# Patient Record
Sex: FEMALE | Race: WHITE | Employment: FULL TIME | URBAN - NONMETROPOLITAN AREA
[De-identification: names, ages, dates, MRNs, and addresses within clinical notes are randomized per-mention and may not be internally consistent; named-entity substitution may affect disease eponyms.]

---
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
## 2019-01-03 ENCOUNTER — OFFICE VISIT (OUTPATIENT)
Dept: URGENT CARE | Age: 19
End: 2019-01-03
Payer: COMMERCIAL

## 2019-01-03 VITALS
OXYGEN SATURATION: 98 % | TEMPERATURE: 98 F | HEART RATE: 97 BPM | RESPIRATION RATE: 18 BRPM | SYSTOLIC BLOOD PRESSURE: 160 MMHG | WEIGHT: 260 LBS | BODY MASS INDEX: 37.22 KG/M2 | DIASTOLIC BLOOD PRESSURE: 96 MMHG | HEIGHT: 70 IN

## 2019-01-03 DIAGNOSIS — J02.9 SORE THROAT: ICD-10-CM

## 2019-01-03 DIAGNOSIS — R05.9 COUGH: ICD-10-CM

## 2019-01-03 DIAGNOSIS — H66.92 ACUTE LEFT OTITIS MEDIA: Primary | ICD-10-CM

## 2019-01-03 LAB — S PYO AG THROAT QL: NORMAL

## 2019-01-03 PROCEDURE — 87880 STREP A ASSAY W/OPTIC: CPT | Performed by: NURSE PRACTITIONER

## 2019-01-03 PROCEDURE — 96372 THER/PROPH/DIAG INJ SC/IM: CPT | Performed by: NURSE PRACTITIONER

## 2019-01-03 PROCEDURE — 99213 OFFICE O/P EST LOW 20 MIN: CPT | Performed by: NURSE PRACTITIONER

## 2019-01-03 RX ORDER — AMOXICILLIN 875 MG/1
875 TABLET, COATED ORAL 2 TIMES DAILY
Qty: 20 TABLET | Refills: 0 | Status: SHIPPED | OUTPATIENT
Start: 2019-01-03 | End: 2019-01-13

## 2019-01-03 RX ORDER — BENZONATATE 100 MG/1
100 CAPSULE ORAL 3 TIMES DAILY PRN
Qty: 21 CAPSULE | Refills: 0 | Status: SHIPPED | OUTPATIENT
Start: 2019-01-03 | End: 2019-01-10

## 2019-01-03 RX ORDER — DEXAMETHASONE SODIUM PHOSPHATE 10 MG/ML
10 INJECTION INTRAMUSCULAR; INTRAVENOUS ONCE
Status: COMPLETED | OUTPATIENT
Start: 2019-01-03 | End: 2019-01-03

## 2019-01-03 RX ADMIN — DEXAMETHASONE SODIUM PHOSPHATE 10 MG: 10 INJECTION INTRAMUSCULAR; INTRAVENOUS at 18:22

## 2019-01-03 ASSESSMENT — ENCOUNTER SYMPTOMS
NAUSEA: 0
SORE THROAT: 1
VOMITING: 0
COUGH: 1

## 2019-03-13 ENCOUNTER — OFFICE VISIT (OUTPATIENT)
Dept: URGENT CARE | Age: 19
End: 2019-03-13
Payer: COMMERCIAL

## 2019-03-13 VITALS
HEIGHT: 70 IN | WEIGHT: 259 LBS | HEART RATE: 112 BPM | SYSTOLIC BLOOD PRESSURE: 137 MMHG | OXYGEN SATURATION: 98 % | BODY MASS INDEX: 37.08 KG/M2 | TEMPERATURE: 98.2 F | DIASTOLIC BLOOD PRESSURE: 89 MMHG | RESPIRATION RATE: 18 BRPM

## 2019-03-13 DIAGNOSIS — J02.9 VIRAL PHARYNGITIS: Primary | ICD-10-CM

## 2019-03-13 DIAGNOSIS — J02.9 SORE THROAT: ICD-10-CM

## 2019-03-13 DIAGNOSIS — R05.9 COUGH: ICD-10-CM

## 2019-03-13 DIAGNOSIS — J30.2 SEASONAL ALLERGIES: ICD-10-CM

## 2019-03-13 LAB
INFLUENZA A ANTIBODY: NEGATIVE
INFLUENZA B ANTIBODY: NEGATIVE
S PYO AG THROAT QL: NORMAL

## 2019-03-13 PROCEDURE — 87880 STREP A ASSAY W/OPTIC: CPT | Performed by: SPECIALIST

## 2019-03-13 PROCEDURE — 99213 OFFICE O/P EST LOW 20 MIN: CPT | Performed by: SPECIALIST

## 2019-03-13 PROCEDURE — 87804 INFLUENZA ASSAY W/OPTIC: CPT | Performed by: SPECIALIST

## 2019-03-13 PROCEDURE — 96372 THER/PROPH/DIAG INJ SC/IM: CPT | Performed by: SPECIALIST

## 2019-03-13 RX ORDER — MONTELUKAST SODIUM 10 MG/1
10 TABLET ORAL DAILY
Qty: 30 TABLET | Refills: 3 | Status: SHIPPED | OUTPATIENT
Start: 2019-03-13 | End: 2021-10-12

## 2019-03-13 RX ORDER — METHYLPREDNISOLONE 4 MG/1
TABLET ORAL
Qty: 1 KIT | Refills: 0 | Status: SHIPPED | OUTPATIENT
Start: 2019-03-13 | End: 2019-11-07 | Stop reason: ALTCHOICE

## 2019-03-13 RX ORDER — DEXAMETHASONE SODIUM PHOSPHATE 10 MG/ML
5 INJECTION INTRAMUSCULAR; INTRAVENOUS ONCE
Status: COMPLETED | OUTPATIENT
Start: 2019-03-13 | End: 2019-03-13

## 2019-03-13 RX ADMIN — DEXAMETHASONE SODIUM PHOSPHATE 5 MG: 10 INJECTION INTRAMUSCULAR; INTRAVENOUS at 13:58

## 2019-03-13 ASSESSMENT — ENCOUNTER SYMPTOMS
SORE THROAT: 1
RHINORRHEA: 1
COUGH: 1

## 2019-03-26 ENCOUNTER — OFFICE VISIT (OUTPATIENT)
Dept: URGENT CARE | Age: 19
End: 2019-03-26
Payer: COMMERCIAL

## 2019-03-26 VITALS
WEIGHT: 261.4 LBS | HEART RATE: 93 BPM | SYSTOLIC BLOOD PRESSURE: 136 MMHG | DIASTOLIC BLOOD PRESSURE: 90 MMHG | TEMPERATURE: 98.5 F | OXYGEN SATURATION: 99 % | HEIGHT: 70 IN | BODY MASS INDEX: 37.42 KG/M2 | RESPIRATION RATE: 18 BRPM

## 2019-03-26 DIAGNOSIS — R11.0 NAUSEA: Primary | ICD-10-CM

## 2019-03-26 DIAGNOSIS — R10.9 FLANK PAIN: ICD-10-CM

## 2019-03-26 LAB
BILIRUBIN, POC: NEGATIVE
BLOOD URINE, POC: NEGATIVE
CLARITY, POC: CLEAR
COLOR, POC: YELLOW
CONTROL: PRESENT
GLUCOSE URINE, POC: NEGATIVE
KETONES, POC: NEGATIVE
LEUKOCYTE EST, POC: ABNORMAL
NITRITE, POC: NEGATIVE
PH, POC: 6
PREGNANCY TEST URINE, POC: NEGATIVE
PROTEIN, POC: NEGATIVE
SPECIFIC GRAVITY, POC: 1.02
UROBILINOGEN, POC: ABNORMAL

## 2019-03-26 PROCEDURE — 81002 URINALYSIS NONAUTO W/O SCOPE: CPT | Performed by: NURSE PRACTITIONER

## 2019-03-26 PROCEDURE — 81025 URINE PREGNANCY TEST: CPT | Performed by: NURSE PRACTITIONER

## 2019-03-26 PROCEDURE — 99213 OFFICE O/P EST LOW 20 MIN: CPT | Performed by: NURSE PRACTITIONER

## 2019-03-26 ASSESSMENT — ENCOUNTER SYMPTOMS
ABDOMINAL DISTENTION: 0
VOMITING: 0
NAUSEA: 1
EYE REDNESS: 0
SINUS PRESSURE: 0
WHEEZING: 0
SHORTNESS OF BREATH: 0
RESPIRATORY NEGATIVE: 1
DIARRHEA: 0
EYES NEGATIVE: 1
COUGH: 0
ALLERGIC/IMMUNOLOGIC NEGATIVE: 1
ABDOMINAL PAIN: 0
CONSTIPATION: 0
SORE THROAT: 0
TROUBLE SWALLOWING: 0

## 2019-03-27 ENCOUNTER — TRANSCRIBE ORDERS (OUTPATIENT)
Dept: ADMINISTRATIVE | Facility: HOSPITAL | Age: 19
End: 2019-03-27

## 2019-03-27 ENCOUNTER — HOSPITAL ENCOUNTER (OUTPATIENT)
Dept: GENERAL RADIOLOGY | Facility: HOSPITAL | Age: 19
Discharge: HOME | End: 2019-03-27
Admitting: NURSE PRACTITIONER

## 2019-03-27 DIAGNOSIS — R10.9 FLANK PAIN: ICD-10-CM

## 2019-03-27 DIAGNOSIS — R10.9 FLANK PAIN: Primary | ICD-10-CM

## 2019-03-27 PROCEDURE — 74018 RADEX ABDOMEN 1 VIEW: CPT

## 2019-03-28 LAB — URINE CULTURE, ROUTINE: NORMAL

## 2019-04-08 ENCOUNTER — TRANSCRIBE ORDERS (OUTPATIENT)
Dept: ADMINISTRATIVE | Facility: HOSPITAL | Age: 19
End: 2019-04-08

## 2019-04-08 ENCOUNTER — APPOINTMENT (OUTPATIENT)
Dept: LAB | Facility: HOSPITAL | Age: 19
End: 2019-04-08

## 2019-04-08 DIAGNOSIS — Z00.00 ROUTINE GENERAL MEDICAL EXAMINATION AT A HEALTH CARE FACILITY: Primary | ICD-10-CM

## 2019-04-08 DIAGNOSIS — I10 ESSENTIAL HYPERTENSION, MALIGNANT: ICD-10-CM

## 2019-04-08 DIAGNOSIS — E03.9 HYPOTHYROIDISM, ADULT: ICD-10-CM

## 2019-04-08 LAB
25(OH)D3 SERPL-MCNC: 23.4 NG/ML (ref 30–100)
ALBUMIN SERPL-MCNC: 3.8 G/DL (ref 3.5–5)
ALBUMIN/GLOB SERPL: 1.4 G/DL (ref 1.1–2.5)
ALP SERPL-CCNC: 56 U/L (ref 24–120)
ALT SERPL W P-5'-P-CCNC: 65 U/L (ref 0–54)
ANION GAP SERPL CALCULATED.3IONS-SCNC: 10 MMOL/L (ref 4–13)
ARTICHOKE IGE QN: 93 MG/DL (ref 0–99)
AST SERPL-CCNC: 38 U/L (ref 7–45)
BACTERIA UR QL AUTO: ABNORMAL /HPF
BASOPHILS # BLD AUTO: 0.05 10*3/MM3 (ref 0–0.2)
BASOPHILS NFR BLD AUTO: 0.9 % (ref 0–2)
BILIRUB SERPL-MCNC: 0.4 MG/DL (ref 0.1–1)
BILIRUB UR QL STRIP: NEGATIVE
BUN BLD-MCNC: 9 MG/DL (ref 5–21)
BUN/CREAT SERPL: 18 (ref 7–25)
CALCIUM SPEC-SCNC: 9 MG/DL (ref 8.4–10.4)
CHLORIDE SERPL-SCNC: 106 MMOL/L (ref 98–110)
CHOLEST SERPL-MCNC: 148 MG/DL (ref 130–200)
CLARITY UR: CLEAR
CO2 SERPL-SCNC: 23 MMOL/L (ref 24–31)
COLOR UR: YELLOW
CREAT BLD-MCNC: 0.5 MG/DL (ref 0.5–1.4)
DEPRECATED RDW RBC AUTO: 38.4 FL (ref 40–54)
EOSINOPHIL # BLD AUTO: 0.13 10*3/MM3 (ref 0–0.7)
EOSINOPHIL NFR BLD AUTO: 2.3 % (ref 0–4)
ERYTHROCYTE [DISTWIDTH] IN BLOOD BY AUTOMATED COUNT: 12 % (ref 12–15)
GFR SERPL CREATININE-BSD FRML MDRD: >150 ML/MIN/1.73
GLOBULIN UR ELPH-MCNC: 2.8 GM/DL
GLUCOSE BLD-MCNC: 86 MG/DL (ref 70–100)
GLUCOSE UR STRIP-MCNC: NEGATIVE MG/DL
HCT VFR BLD AUTO: 37.3 % (ref 37–47)
HDLC SERPL-MCNC: 41 MG/DL
HGB BLD-MCNC: 13.1 G/DL (ref 12–16)
HGB UR QL STRIP.AUTO: NEGATIVE
HYALINE CASTS UR QL AUTO: ABNORMAL /LPF
IMM GRANULOCYTES # BLD AUTO: 0.01 10*3/MM3 (ref 0–0.05)
IMM GRANULOCYTES NFR BLD AUTO: 0.2 % (ref 0–5)
KETONES UR QL STRIP: NEGATIVE
LDLC/HDLC SERPL: 2.14 {RATIO}
LEUKOCYTE ESTERASE UR QL STRIP.AUTO: ABNORMAL
LYMPHOCYTES # BLD AUTO: 2.04 10*3/MM3 (ref 0.72–4.86)
LYMPHOCYTES NFR BLD AUTO: 36.4 % (ref 15–45)
MCH RBC QN AUTO: 30.6 PG (ref 28–32)
MCHC RBC AUTO-ENTMCNC: 35.1 G/DL (ref 33–36)
MCV RBC AUTO: 87.1 FL (ref 82–98)
MONOCYTES # BLD AUTO: 0.57 10*3/MM3 (ref 0.19–1.3)
MONOCYTES NFR BLD AUTO: 10.2 % (ref 4–12)
NEUTROPHILS # BLD AUTO: 2.81 10*3/MM3 (ref 1.87–8.4)
NEUTROPHILS NFR BLD AUTO: 50 % (ref 39–78)
NITRITE UR QL STRIP: NEGATIVE
NRBC BLD AUTO-RTO: 0 /100 WBC (ref 0–0)
PH UR STRIP.AUTO: 7 [PH] (ref 5–8)
PLATELET # BLD AUTO: 190 10*3/MM3 (ref 130–400)
PMV BLD AUTO: 11.2 FL (ref 6–12)
POTASSIUM BLD-SCNC: 4 MMOL/L (ref 3.5–5.3)
PROT SERPL-MCNC: 6.6 G/DL (ref 6.3–8.7)
PROT UR QL STRIP: NEGATIVE
RBC # BLD AUTO: 4.28 10*6/MM3 (ref 4.2–5.4)
RBC # UR: ABNORMAL /HPF
REF LAB TEST METHOD: ABNORMAL
SODIUM BLD-SCNC: 139 MMOL/L (ref 135–145)
SP GR UR STRIP: 1.01 (ref 1–1.03)
SQUAMOUS #/AREA URNS HPF: ABNORMAL /HPF
TRIGL SERPL-MCNC: 96 MG/DL (ref 0–149)
TSH SERPL DL<=0.05 MIU/L-ACNC: 5.6 MIU/ML (ref 0.47–4.68)
UROBILINOGEN UR QL STRIP: ABNORMAL
WBC NRBC COR # BLD: 5.61 10*3/MM3 (ref 4.8–10.8)
WBC UR QL AUTO: ABNORMAL /HPF

## 2019-04-08 PROCEDURE — 80053 COMPREHEN METABOLIC PANEL: CPT | Performed by: PHYSICIAN ASSISTANT

## 2019-04-08 PROCEDURE — 84436 ASSAY OF TOTAL THYROXINE: CPT | Performed by: PHYSICIAN ASSISTANT

## 2019-04-08 PROCEDURE — 80061 LIPID PANEL: CPT | Performed by: PHYSICIAN ASSISTANT

## 2019-04-08 PROCEDURE — 81001 URINALYSIS AUTO W/SCOPE: CPT | Performed by: PHYSICIAN ASSISTANT

## 2019-04-08 PROCEDURE — 84443 ASSAY THYROID STIM HORMONE: CPT | Performed by: PHYSICIAN ASSISTANT

## 2019-04-08 PROCEDURE — 82306 VITAMIN D 25 HYDROXY: CPT | Performed by: PHYSICIAN ASSISTANT

## 2019-04-08 PROCEDURE — 85025 COMPLETE CBC W/AUTO DIFF WBC: CPT | Performed by: PHYSICIAN ASSISTANT

## 2019-04-08 PROCEDURE — 36415 COLL VENOUS BLD VENIPUNCTURE: CPT | Performed by: PHYSICIAN ASSISTANT

## 2019-04-09 LAB — T4 SERPL-MCNC: 6.6 UG/DL (ref 4.5–12)

## 2019-04-10 ENCOUNTER — TRANSCRIBE ORDERS (OUTPATIENT)
Dept: ADMINISTRATIVE | Facility: HOSPITAL | Age: 19
End: 2019-04-10

## 2019-04-10 DIAGNOSIS — R79.89 ELEVATED LFTS: Primary | ICD-10-CM

## 2019-04-15 ENCOUNTER — HOSPITAL ENCOUNTER (OUTPATIENT)
Dept: ULTRASOUND IMAGING | Facility: HOSPITAL | Age: 19
Discharge: HOME | End: 2019-04-15
Admitting: PHYSICIAN ASSISTANT

## 2019-04-15 ENCOUNTER — APPOINTMENT (OUTPATIENT)
Dept: ULTRASOUND IMAGING | Facility: HOSPITAL | Age: 19
End: 2019-04-15

## 2019-04-15 PROCEDURE — 76705 ECHO EXAM OF ABDOMEN: CPT

## 2019-04-17 ENCOUNTER — APPOINTMENT (OUTPATIENT)
Dept: ULTRASOUND IMAGING | Facility: HOSPITAL | Age: 19
End: 2019-04-17

## 2019-06-06 ENCOUNTER — NURSE TRIAGE (OUTPATIENT)
Dept: CALL CENTER | Facility: HOSPITAL | Age: 19
End: 2019-06-06

## 2019-06-07 NOTE — TELEPHONE ENCOUNTER
Reviewed Guideline with caller, advises home care. Advised caller she may want to contact the on call doctor for her OB/GYN and get their opinion. Caller agrees to this plan.     Reason for Disposition  • Back pain    Additional Information  • Negative: Passed out (i.e., lost consciousness, collapsed and was not responding)  • Negative: Shock suspected (e.g., cold/pale/clammy skin, too weak to stand, low BP, rapid pulse)  • Negative: Sounds like a life-threatening emergency to the triager  • Negative: Major injury to the back (e.g., MVA, fall > 10 feet or 3 meters, penetrating injury, etc.)  • Negative: Followed a tailbone injury  • Negative: [1] Pain in the upper back over the ribs (rib cage) AND [2] radiates (travels, goes) into chest  • Negative: [1] Pain in the upper back over the ribs (rib cage) AND [2] worsened by coughing (or clearly increases with breathing)  • Negative: Back pain during pregnancy  • Negative: Pain mainly in flank (i.e., in the side, over the lower ribs or just below the ribs)  • Negative: [1] SEVERE back pain (e.g., excruciating) AND [2] sudden onset AND [3] age > 60  • Negative: [1] Unable to urinate (or only a few drops) > 4 hours AND     [2] bladder feels very full (e.g., palpable bladder or strong urge to urinate)  • Negative: [1] Urinary or bowel incontinence (i.e., loss of bladder or bowel control) AND [2] new onset  • Negative: Numbness in groin or rectal area (i.e., loss of sensation)  • Negative: [1] SEVERE abdominal pain AND [2] present > 1 hour  • Negative: [1] Abdominal pain AND [2] age > 60  • Negative: Weakness of a leg or foot (e.g., unable to bear weight, dragging foot)  • Negative: Unable to walk  • Negative: Patient sounds very sick or weak to the triager  • Negative: [1] SEVERE back pain (e.g., excruciating, unable to do any normal activities) AND [2] not improved 2 hours after pain medicine  • Negative: [1] Pain radiates into the thigh or further down the leg AND [2]  "both legs  • Negative: [1] Fever > 100.0 F (37.8 C) AND [2] flank pain (i.e., in side, below ribs and above hip)  • Negative: [1] Pain or burning with urination AND [2] flank pain (i.e., in side, below ribs and above hip)  • Negative: Numbness in a leg or foot (i.e., loss of sensation)  • Negative: High-risk adult (e.g., history of cancer, HIV, or IV drug abuse)  • Negative: [1] Fever AND [2] no symptoms of UTI  (Exception: has generalized muscle pains, not localized back pain)  • Negative: Rash in same area as pain (may be described as \"small blisters\")  • Negative: Blood in urine (red, pink, or tea-colored)  • Negative: [1] MODERATE back pain (e.g., interferes with normal activities) AND [2] present > 3 days  • Negative: [1] Pain radiates into the thigh or further down the leg AND [2] one leg  • Negative: [1] Age > 50 AND [2] no history of prior similar back pain  • Negative: Back pain present > 2 weeks  • Negative: Back pain is a chronic symptom (recurrent or ongoing AND present > 4 weeks)  • Negative: Caused by a twisting, bending, or lifting injury  • Negative: Caused by overuse from recent vigorous activity (e.g., exercise, gardening, lifting and carrying, sports)    Answer Assessment - Initial Assessment Questions  1. ONSET: \"When did the pain begin?\"       This morning  2. LOCATION: \"Where does it hurt?\" (upper, mid or lower back)     Lower Back and right side  3. SEVERITY: \"How bad is the pain?\"  (e.g., Scale 1-10; mild, moderate, or severe)    - MILD (1-3): doesn't interfere with normal activities     - MODERATE (4-7): interferes with normal activities or awakens from sleep     - SEVERE (8-10): excruciating pain, unable to do any normal activities       mild  4. PATTERN: \"Is the pain constant?\" (e.g., yes, no; constant, intermittent)       constant  5. RADIATION: \"Does the pain shoot into your legs or elsewhere?\"      A little but not all the time  6. CAUSE:  \"What do you think is causing the back pain?\" " "      Ovarian cyst  7. BACK OVERUSE:  \"Any recent lifting of heavy objects, strenuous work or exercise?\"      no  8. MEDICATIONS: \"What have you taken so far for the pain?\" (e.g., nothing, acetaminophen, NSAIDS)      no  9. NEUROLOGIC SYMPTOMS: \"Do you have any weakness, numbness, or problems with bowel/bladder control?\"      no  10. OTHER SYMPTOMS: \"Do you have any other symptoms?\" (e.g., fever, abdominal pain, burning with urination, blood in urine)        no  11. PREGNANCY: \"Is there any chance you are pregnant?\" (e.g., yes, no; LMP)        No, LMP May 20, 2019    Protocols used: BACK PAIN-ADULT-AH      "

## 2019-07-05 ENCOUNTER — TRANSCRIBE ORDERS (OUTPATIENT)
Dept: ADMINISTRATIVE | Facility: HOSPITAL | Age: 19
End: 2019-07-05

## 2019-07-05 ENCOUNTER — HOSPITAL ENCOUNTER (OUTPATIENT)
Dept: GENERAL RADIOLOGY | Facility: HOSPITAL | Age: 19
Discharge: HOME | End: 2019-07-05
Admitting: INTERNAL MEDICINE

## 2019-07-05 DIAGNOSIS — R76.11 NONSPECIFIC REACTION TO TUBERCULIN SKIN TEST WITHOUT ACTIVE TUBERCULOSIS: ICD-10-CM

## 2019-07-05 DIAGNOSIS — R76.11 NONSPECIFIC REACTION TO TUBERCULIN SKIN TEST WITHOUT ACTIVE TUBERCULOSIS: Primary | ICD-10-CM

## 2019-07-05 PROCEDURE — 71046 X-RAY EXAM CHEST 2 VIEWS: CPT

## 2019-07-09 ENCOUNTER — TRANSCRIBE ORDERS (OUTPATIENT)
Dept: ADMINISTRATIVE | Facility: HOSPITAL | Age: 19
End: 2019-07-09

## 2019-07-09 ENCOUNTER — APPOINTMENT (OUTPATIENT)
Dept: LAB | Facility: HOSPITAL | Age: 19
End: 2019-07-09

## 2019-07-09 DIAGNOSIS — E03.9 HYPOTHYROIDISM, UNSPECIFIED TYPE: Primary | ICD-10-CM

## 2019-07-09 LAB
T4 FREE SERPL-MCNC: 0.99 NG/DL (ref 0.78–2.19)
TSH SERPL DL<=0.05 MIU/L-ACNC: 5.83 MIU/ML (ref 0.47–4.68)

## 2019-07-09 PROCEDURE — 84439 ASSAY OF FREE THYROXINE: CPT | Performed by: PHYSICIAN ASSISTANT

## 2019-07-09 PROCEDURE — 36415 COLL VENOUS BLD VENIPUNCTURE: CPT | Performed by: PHYSICIAN ASSISTANT

## 2019-07-09 PROCEDURE — 84443 ASSAY THYROID STIM HORMONE: CPT | Performed by: PHYSICIAN ASSISTANT

## 2019-08-13 ENCOUNTER — OFFICE VISIT (OUTPATIENT)
Dept: OBGYN | Age: 19
End: 2019-08-13
Payer: COMMERCIAL

## 2019-08-13 ENCOUNTER — TRANSCRIBE ORDERS (OUTPATIENT)
Dept: ADMINISTRATIVE | Facility: HOSPITAL | Age: 19
End: 2019-08-13

## 2019-08-13 VITALS
SYSTOLIC BLOOD PRESSURE: 136 MMHG | WEIGHT: 280 LBS | BODY MASS INDEX: 40.09 KG/M2 | HEIGHT: 70 IN | TEMPERATURE: 98 F | HEART RATE: 85 BPM | DIASTOLIC BLOOD PRESSURE: 86 MMHG

## 2019-08-13 DIAGNOSIS — N83.209 CYST OF OVARY, UNSPECIFIED LATERALITY: Primary | ICD-10-CM

## 2019-08-13 DIAGNOSIS — R10.11 ABDOMINAL PAIN, RIGHT UPPER QUADRANT: Primary | ICD-10-CM

## 2019-08-13 PROCEDURE — 99203 OFFICE O/P NEW LOW 30 MIN: CPT | Performed by: NURSE PRACTITIONER

## 2019-08-13 RX ORDER — LEVOTHYROXINE SODIUM 0.07 MG/1
75 TABLET ORAL DAILY
COMMUNITY
Start: 2019-07-10

## 2019-08-13 ASSESSMENT — ENCOUNTER SYMPTOMS
GASTROINTESTINAL NEGATIVE: 1
EYES NEGATIVE: 1
RESPIRATORY NEGATIVE: 1

## 2019-08-13 NOTE — PROGRESS NOTES
Ela Whatley is a 20 y.o. female who presents today for her medical conditions/ complaints as noted below. Ela Whatley is c/o of Other (Ovarian cyst found on CT on right ovary)        HPI  Ovarian cyst found on CT scan, accidentally, when looking for kidney stone. Patient has no pain with it but wanted a second opinion. I have no record of pt having CT scan. She has been seen most recently at Dr [facility] and had US and states they were following an ovarian cyst.   States periods normal and regular  On OCP and doesn't miss any doses. NO hx of PCOS. Patient's last menstrual period was 08/05/2019. No obstetric history on file. Past Medical History:   Diagnosis Date    Anxiety      Past Surgical History:   Procedure Laterality Date    WISDOM TOOTH EXTRACTION       Family History   Problem Relation Age of Onset    Other Mother         kidney stones    Thyroid Disease Mother     Cancer Other         unknown, great maternal grandmother     Social History     Tobacco Use    Smoking status: Never Smoker    Smokeless tobacco: Never Used   Substance Use Topics    Alcohol use: No       Current Outpatient Medications   Medication Sig Dispense Refill    levothyroxine (SYNTHROID) 50 MCG tablet       fluticasone (FLONASE) 50 MCG/ACT nasal spray 1 spray by Nasal route daily 1 Bottle 1    norgestimate-ethinyl estradiol (SPRINTEC 28) 0.25-35 MG-MCG per tablet Take 1 tablet by mouth daily 1 packet 0    montelukast (SINGULAIR) 10 MG tablet Take 1 tablet by mouth daily (Patient not taking: Reported on 8/13/2019) 30 tablet 3    methylPREDNISolone (MEDROL DOSEPACK) 4 MG tablet Take by mouth. (Patient not taking: Reported on 8/13/2019) 1 kit 0     No current facility-administered medications for this visit. No Known Allergies  Vitals:    08/13/19 1146   BP: 136/86   Pulse: 85   Temp: 98 °F (36.7 °C)     Body mass index is 40.18 kg/m². Review of Systems   Constitutional: Negative. HENT: Negative.     Eyes: Negative. Respiratory: Negative. Cardiovascular: Negative. Gastrointestinal: Negative. Genitourinary: Negative for difficulty urinating, dyspareunia, dysuria, enuresis, frequency, hematuria, menstrual problem, pelvic pain, urgency and vaginal discharge. Musculoskeletal: Negative. Skin: Negative. Neurological: Negative. Psychiatric/Behavioral: Negative. Physical Exam   Constitutional: She is oriented to person, place, and time. She appears well-developed and well-nourished. No distress. HENT:   Head: Normocephalic and atraumatic. Eyes: Pupils are equal, round, and reactive to light. Conjunctivae are normal.   Neck: Normal range of motion. Pulmonary/Chest: Effort normal.   Abdominal: There is no guarding. Musculoskeletal: Normal range of motion. She exhibits no edema. Normal ROM in all 4 extremities; normal gait   Neurological: She is alert and oriented to person, place, and time. She exhibits normal muscle tone. Coordination normal.   Skin: Skin is warm and dry. She is not diaphoretic. Psychiatric: She has a normal mood and affect. Her behavior is normal.   Nursing note and vitals reviewed. Diagnosis Orders   1. Cyst of ovary, unspecified laterality         MEDICATIONS:  No orders of the defined types were placed in this encounter. ORDERS:  No orders of the defined types were placed in this encounter. PLAN:  Filled out med record release. Will continue OCP. Consider repeat US to characterize cyst pending records. There are no Patient Instructions on file for this visit.

## 2019-08-16 ENCOUNTER — HOSPITAL ENCOUNTER (OUTPATIENT)
Dept: NUCLEAR MEDICINE | Facility: HOSPITAL | Age: 19
End: 2019-08-16

## 2019-08-16 ENCOUNTER — APPOINTMENT (OUTPATIENT)
Dept: ULTRASOUND IMAGING | Facility: HOSPITAL | Age: 19
End: 2019-08-16

## 2019-08-19 ENCOUNTER — TRANSCRIBE ORDERS (OUTPATIENT)
Dept: ADMINISTRATIVE | Facility: HOSPITAL | Age: 19
End: 2019-08-19

## 2019-08-19 ENCOUNTER — HOSPITAL ENCOUNTER (OUTPATIENT)
Dept: GENERAL RADIOLOGY | Facility: HOSPITAL | Age: 19
Discharge: HOME | End: 2019-08-19

## 2019-08-19 ENCOUNTER — HOSPITAL ENCOUNTER (OUTPATIENT)
Dept: NUCLEAR MEDICINE | Facility: HOSPITAL | Age: 19
Discharge: HOME | End: 2019-08-19

## 2019-08-19 ENCOUNTER — APPOINTMENT (OUTPATIENT)
Dept: ULTRASOUND IMAGING | Facility: HOSPITAL | Age: 19
End: 2019-08-19

## 2019-08-19 ENCOUNTER — APPOINTMENT (OUTPATIENT)
Dept: NUCLEAR MEDICINE | Facility: HOSPITAL | Age: 19
End: 2019-08-19

## 2019-08-19 ENCOUNTER — HOSPITAL ENCOUNTER (OUTPATIENT)
Dept: ULTRASOUND IMAGING | Facility: HOSPITAL | Age: 19
Discharge: HOME | End: 2019-08-19
Admitting: PHYSICIAN ASSISTANT

## 2019-08-19 DIAGNOSIS — R10.11 RUQ PAIN: Primary | ICD-10-CM

## 2019-08-19 DIAGNOSIS — R10.9 ABDOMINAL PAIN, UNSPECIFIED ABDOMINAL LOCATION: ICD-10-CM

## 2019-08-19 DIAGNOSIS — R10.11 RUQ PAIN: ICD-10-CM

## 2019-08-19 PROCEDURE — 0 TECHNETIUM TC 99M MEBROFENIN KIT: Performed by: PHYSICIAN ASSISTANT

## 2019-08-19 PROCEDURE — 78226 HEPATOBILIARY SYSTEM IMAGING: CPT

## 2019-08-19 PROCEDURE — A9537 TC99M MEBROFENIN: HCPCS | Performed by: PHYSICIAN ASSISTANT

## 2019-08-19 PROCEDURE — 76705 ECHO EXAM OF ABDOMEN: CPT

## 2019-08-19 PROCEDURE — 74018 RADEX ABDOMEN 1 VIEW: CPT

## 2019-08-19 RX ORDER — KIT FOR THE PREPARATION OF TECHNETIUM TC 99M MEBROFENIN 45 MG/10ML
1 INJECTION, POWDER, LYOPHILIZED, FOR SOLUTION INTRAVENOUS
Status: COMPLETED | OUTPATIENT
Start: 2019-08-19 | End: 2019-08-19

## 2019-08-19 RX ADMIN — MEBROFENIN 1 DOSE: 45 INJECTION, POWDER, LYOPHILIZED, FOR SOLUTION INTRAVENOUS at 14:23

## 2019-08-27 DIAGNOSIS — N83.201 RIGHT OVARIAN CYST: Primary | ICD-10-CM

## 2019-09-03 ENCOUNTER — OFFICE VISIT (OUTPATIENT)
Dept: URGENT CARE | Age: 19
End: 2019-09-03
Payer: COMMERCIAL

## 2019-09-03 VITALS
DIASTOLIC BLOOD PRESSURE: 90 MMHG | RESPIRATION RATE: 18 BRPM | HEIGHT: 70 IN | SYSTOLIC BLOOD PRESSURE: 138 MMHG | WEIGHT: 254 LBS | BODY MASS INDEX: 36.36 KG/M2 | OXYGEN SATURATION: 99 % | TEMPERATURE: 98.6 F | HEART RATE: 103 BPM

## 2019-09-03 DIAGNOSIS — J06.9 UPPER RESPIRATORY TRACT INFECTION, UNSPECIFIED TYPE: ICD-10-CM

## 2019-09-03 DIAGNOSIS — J02.9 SORE THROAT: Primary | ICD-10-CM

## 2019-09-03 LAB — S PYO AG THROAT QL: NORMAL

## 2019-09-03 PROCEDURE — 87880 STREP A ASSAY W/OPTIC: CPT | Performed by: NURSE PRACTITIONER

## 2019-09-03 PROCEDURE — 99213 OFFICE O/P EST LOW 20 MIN: CPT | Performed by: NURSE PRACTITIONER

## 2019-09-03 RX ORDER — AZITHROMYCIN 250 MG/1
250 TABLET, FILM COATED ORAL SEE ADMIN INSTRUCTIONS
Qty: 6 TABLET | Refills: 0 | Status: SHIPPED | OUTPATIENT
Start: 2019-09-03 | End: 2019-09-08

## 2019-09-03 ASSESSMENT — ENCOUNTER SYMPTOMS
EYES NEGATIVE: 1
WHEEZING: 0
SWOLLEN GLANDS: 1
SINUS PRESSURE: 1
SHORTNESS OF BREATH: 0
SINUS PAIN: 1
ALLERGIC/IMMUNOLOGIC NEGATIVE: 1
COUGH: 1
SORE THROAT: 1

## 2019-09-03 NOTE — PROGRESS NOTES
Tonsils are 0 on the left. No tonsillar exudate. Eyes: Lids are normal.   Neck: Phonation normal. Neck supple. Cardiovascular: Normal rate, regular rhythm, S1 normal, S2 normal and normal heart sounds. Exam reveals no gallop and no friction rub. No murmur heard. Pulmonary/Chest: Effort normal and breath sounds normal. No respiratory distress. She has no wheezes. She has no rhonchi. She has no rales. She exhibits no tenderness. Abdominal: Soft. Normal appearance. Musculoskeletal: Normal range of motion. She exhibits no edema, tenderness or deformity. Lymphadenopathy:        Head (right side): Preauricular adenopathy present. No tonsillar and no posterior auricular adenopathy present. Head (left side): Preauricular adenopathy present. No tonsillar and no posterior auricular adenopathy present. Neurological: She is alert and oriented to person, place, and time. She has normal strength. Skin: Skin is warm, dry and intact. Capillary refill takes less than 2 seconds. No cyanosis. Psychiatric: She has a normal mood and affect. Her speech is normal and behavior is normal.   Nursing note and vitals reviewed. BP (!) 138/90   Pulse 103   Temp 98.6 °F (37 °C) (Oral)   Resp 18   Ht 5' 10\" (1.778 m)   Wt 254 lb (115.2 kg)   LMP 08/05/2019   SpO2 99%   BMI 36.45 kg/m²     :Assessment       Diagnosis Orders   1. Sore throat  POCT rapid strep A   2. Upper respiratory tract infection, unspecified type         :Plan      Orders Placed This Encounter   Procedures    POCT rapid strep A     Results for orders placed or performed in visit on 09/03/19   POCT rapid strep A   Result Value Ref Range    Strep A Ag None Detected None Detected       Return if symptoms worsen or fail to improve.     Orders Placed This Encounter   Medications    azithromycin (ZITHROMAX) 250 MG tablet     Sig: Take 1 tablet by mouth See Admin Instructions for 5 days 500mg on day 1 followed by 250mg on days 2 - 5     Dispense: patient questions answered. Pt voiced understanding.        Electronically signed by ERI Fowler CNP on 9/3/2019 at 6:26 PM

## 2019-09-09 ENCOUNTER — TELEPHONE (OUTPATIENT)
Dept: OBGYN | Age: 19
End: 2019-09-09

## 2019-09-23 ENCOUNTER — HOSPITAL ENCOUNTER (OUTPATIENT)
Dept: ULTRASOUND IMAGING | Age: 19
Discharge: HOME | End: 2019-09-23
Payer: COMMERCIAL

## 2019-09-23 DIAGNOSIS — N83.201 RIGHT OVARIAN CYST: ICD-10-CM

## 2019-09-23 PROCEDURE — 76830 TRANSVAGINAL US NON-OB: CPT

## 2019-09-25 DIAGNOSIS — N83.201 RIGHT OVARIAN CYST: Primary | ICD-10-CM

## 2019-10-11 ENCOUNTER — APPOINTMENT (OUTPATIENT)
Dept: LAB | Facility: HOSPITAL | Age: 19
End: 2019-10-11

## 2019-10-11 ENCOUNTER — TRANSCRIBE ORDERS (OUTPATIENT)
Dept: ADMINISTRATIVE | Facility: HOSPITAL | Age: 19
End: 2019-10-11

## 2019-10-11 DIAGNOSIS — R79.89 ABNORMAL LFTS: ICD-10-CM

## 2019-10-11 DIAGNOSIS — E03.9 HYPOTHYROIDISM, UNSPECIFIED TYPE: Primary | ICD-10-CM

## 2019-10-11 LAB
ALBUMIN SERPL-MCNC: 4.7 G/DL (ref 3.5–5.2)
ALBUMIN/GLOB SERPL: 1.8 G/DL
ALP SERPL-CCNC: 55 U/L (ref 39–117)
ALT SERPL W P-5'-P-CCNC: 109 U/L (ref 1–33)
ANION GAP SERPL CALCULATED.3IONS-SCNC: 12.8 MMOL/L (ref 5–15)
AST SERPL-CCNC: 51 U/L (ref 1–32)
BILIRUB SERPL-MCNC: 0.7 MG/DL (ref 0.2–1.2)
BUN BLD-MCNC: 7 MG/DL (ref 6–20)
BUN/CREAT SERPL: 10.8 (ref 7–25)
CALCIUM SPEC-SCNC: 9.3 MG/DL (ref 8.6–10.5)
CHLORIDE SERPL-SCNC: 103 MMOL/L (ref 98–107)
CHOLEST SERPL-MCNC: 165 MG/DL (ref 0–200)
CO2 SERPL-SCNC: 25.2 MMOL/L (ref 22–29)
CREAT BLD-MCNC: 0.65 MG/DL (ref 0.57–1)
GFR SERPL CREATININE-BSD FRML MDRD: 116 ML/MIN/1.73
GLOBULIN UR ELPH-MCNC: 2.6 GM/DL
GLUCOSE BLD-MCNC: 84 MG/DL (ref 65–99)
HDLC SERPL-MCNC: 39 MG/DL (ref 40–60)
LDLC SERPL CALC-MCNC: 101 MG/DL (ref 0–100)
LDLC/HDLC SERPL: 2.6 {RATIO}
POTASSIUM BLD-SCNC: 4.5 MMOL/L (ref 3.5–5.2)
PROT SERPL-MCNC: 7.3 G/DL (ref 6–8.5)
SODIUM BLD-SCNC: 141 MMOL/L (ref 136–145)
T4 FREE SERPL-MCNC: 1.18 NG/DL (ref 0.93–1.7)
TRIGL SERPL-MCNC: 123 MG/DL (ref 0–150)
TSH SERPL DL<=0.05 MIU/L-ACNC: 2.72 UIU/ML (ref 0.27–4.2)
VLDLC SERPL-MCNC: 24.6 MG/DL (ref 5–40)

## 2019-10-11 PROCEDURE — 80053 COMPREHEN METABOLIC PANEL: CPT | Performed by: PHYSICIAN ASSISTANT

## 2019-10-11 PROCEDURE — 36415 COLL VENOUS BLD VENIPUNCTURE: CPT | Performed by: PHYSICIAN ASSISTANT

## 2019-10-11 PROCEDURE — 84443 ASSAY THYROID STIM HORMONE: CPT | Performed by: PHYSICIAN ASSISTANT

## 2019-10-11 PROCEDURE — 80061 LIPID PANEL: CPT | Performed by: PHYSICIAN ASSISTANT

## 2019-10-11 PROCEDURE — 84439 ASSAY OF FREE THYROXINE: CPT | Performed by: PHYSICIAN ASSISTANT

## 2019-11-06 ENCOUNTER — PATIENT MESSAGE (OUTPATIENT)
Dept: OBGYN | Age: 19
End: 2019-11-06

## 2019-11-06 RX ORDER — NORETHINDRONE ACETATE AND ETHINYL ESTRADIOL 1.5-30(21)
1 KIT ORAL DAILY
Qty: 1 PACKET | Refills: 11 | Status: SHIPPED | OUTPATIENT
Start: 2019-11-06 | End: 2019-11-07 | Stop reason: ALTCHOICE

## 2019-11-07 ENCOUNTER — OFFICE VISIT (OUTPATIENT)
Dept: OBGYN | Age: 19
End: 2019-11-07
Payer: COMMERCIAL

## 2019-11-07 ENCOUNTER — HOSPITAL ENCOUNTER (OUTPATIENT)
Dept: ULTRASOUND IMAGING | Age: 19
Discharge: HOME | End: 2019-11-07
Payer: COMMERCIAL

## 2019-11-07 VITALS
HEART RATE: 80 BPM | BODY MASS INDEX: 35.5 KG/M2 | WEIGHT: 248 LBS | SYSTOLIC BLOOD PRESSURE: 118 MMHG | DIASTOLIC BLOOD PRESSURE: 86 MMHG | HEIGHT: 70 IN

## 2019-11-07 DIAGNOSIS — N83.201 RIGHT OVARIAN CYST: Primary | ICD-10-CM

## 2019-11-07 DIAGNOSIS — R10.2 PELVIC PAIN: ICD-10-CM

## 2019-11-07 DIAGNOSIS — N83.201 RIGHT OVARIAN CYST: ICD-10-CM

## 2019-11-07 PROCEDURE — 99213 OFFICE O/P EST LOW 20 MIN: CPT | Performed by: NURSE PRACTITIONER

## 2019-11-07 PROCEDURE — 76856 US EXAM PELVIC COMPLETE: CPT

## 2019-11-07 PROCEDURE — 96372 THER/PROPH/DIAG INJ SC/IM: CPT | Performed by: NURSE PRACTITIONER

## 2019-11-07 RX ORDER — OXYCODONE HYDROCHLORIDE AND ACETAMINOPHEN 5; 325 MG/1; MG/1
2 TABLET ORAL EVERY 4 HOURS PRN
Qty: 20 TABLET | Refills: 0 | Status: SHIPPED | OUTPATIENT
Start: 2019-11-07 | End: 2019-11-10

## 2019-11-07 RX ORDER — IBUPROFEN 800 MG/1
800 TABLET ORAL
Qty: 90 TABLET | Refills: 0 | Status: SHIPPED | OUTPATIENT
Start: 2019-11-07 | End: 2021-10-12

## 2019-11-07 RX ORDER — KETOROLAC TROMETHAMINE 30 MG/ML
60 INJECTION, SOLUTION INTRAMUSCULAR; INTRAVENOUS ONCE
Status: COMPLETED | OUTPATIENT
Start: 2019-11-07 | End: 2019-11-07

## 2019-11-07 RX ORDER — NORETHINDRONE ACETATE AND ETHINYL ESTRADIOL 1; .02 MG/1; MG/1
1 TABLET ORAL DAILY
COMMUNITY
End: 2019-11-18 | Stop reason: SDUPTHER

## 2019-11-07 RX ADMIN — KETOROLAC TROMETHAMINE 60 MG: 30 INJECTION, SOLUTION INTRAMUSCULAR; INTRAVENOUS at 10:10

## 2019-11-07 ASSESSMENT — ENCOUNTER SYMPTOMS
EYES NEGATIVE: 1
CONSTIPATION: 0
DIARRHEA: 0
ALLERGIC/IMMUNOLOGIC NEGATIVE: 1
RESPIRATORY NEGATIVE: 1
GASTROINTESTINAL NEGATIVE: 1

## 2019-11-12 ENCOUNTER — OFFICE VISIT (OUTPATIENT)
Dept: OBGYN | Age: 19
End: 2019-11-12
Payer: COMMERCIAL

## 2019-11-12 VITALS
TEMPERATURE: 98.4 F | BODY MASS INDEX: 35.5 KG/M2 | HEIGHT: 70 IN | DIASTOLIC BLOOD PRESSURE: 78 MMHG | SYSTOLIC BLOOD PRESSURE: 128 MMHG | WEIGHT: 248 LBS | HEART RATE: 84 BPM

## 2019-11-12 DIAGNOSIS — N83.201 RIGHT OVARIAN CYST: Primary | ICD-10-CM

## 2019-11-12 PROCEDURE — 99213 OFFICE O/P EST LOW 20 MIN: CPT | Performed by: OBSTETRICS & GYNECOLOGY

## 2019-11-12 RX ORDER — OXYCODONE HYDROCHLORIDE AND ACETAMINOPHEN 5; 325 MG/1; MG/1
1 TABLET ORAL EVERY 4 HOURS PRN
COMMUNITY
End: 2021-10-12

## 2019-11-12 ASSESSMENT — ENCOUNTER SYMPTOMS
EYES NEGATIVE: 1
RESPIRATORY NEGATIVE: 1
GASTROINTESTINAL NEGATIVE: 1

## 2019-11-18 ENCOUNTER — OFFICE VISIT (OUTPATIENT)
Dept: OBGYN | Age: 19
End: 2019-11-18
Payer: COMMERCIAL

## 2019-11-18 VITALS
BODY MASS INDEX: 35.5 KG/M2 | SYSTOLIC BLOOD PRESSURE: 130 MMHG | DIASTOLIC BLOOD PRESSURE: 88 MMHG | HEART RATE: 94 BPM | WEIGHT: 248 LBS | HEIGHT: 70 IN

## 2019-11-18 DIAGNOSIS — R03.0 ELEVATED BLOOD PRESSURE READING: ICD-10-CM

## 2019-11-18 DIAGNOSIS — Z00.00 ANNUAL VISIT FOR GENERAL ADULT MEDICAL EXAMINATION WITHOUT ABNORMAL FINDINGS: Primary | ICD-10-CM

## 2019-11-18 DIAGNOSIS — Z30.41 ENCOUNTER FOR SURVEILLANCE OF CONTRACEPTIVE PILLS: ICD-10-CM

## 2019-11-18 PROCEDURE — 99395 PREV VISIT EST AGE 18-39: CPT | Performed by: ADVANCED PRACTICE MIDWIFE

## 2019-11-18 RX ORDER — PROPRANOLOL HYDROCHLORIDE 20 MG/1
20 TABLET ORAL 3 TIMES DAILY PRN
Qty: 20 TABLET | Refills: 1 | Status: SHIPPED | OUTPATIENT
Start: 2019-11-18 | End: 2022-03-14

## 2019-11-18 RX ORDER — NORETHINDRONE ACETATE AND ETHINYL ESTRADIOL 1; .02 MG/1; MG/1
1 TABLET ORAL DAILY
Qty: 84 TABLET | Refills: 3 | Status: SHIPPED | OUTPATIENT
Start: 2019-11-18 | End: 2020-11-05

## 2019-11-18 ASSESSMENT — ENCOUNTER SYMPTOMS
EYES NEGATIVE: 1
ALLERGIC/IMMUNOLOGIC NEGATIVE: 1
RESPIRATORY NEGATIVE: 1
GASTROINTESTINAL NEGATIVE: 1

## 2019-12-12 ENCOUNTER — OFFICE VISIT (OUTPATIENT)
Dept: URGENT CARE | Age: 19
End: 2019-12-12
Payer: COMMERCIAL

## 2019-12-12 VITALS
WEIGHT: 242 LBS | HEIGHT: 70 IN | OXYGEN SATURATION: 98 % | SYSTOLIC BLOOD PRESSURE: 136 MMHG | RESPIRATION RATE: 18 BRPM | DIASTOLIC BLOOD PRESSURE: 88 MMHG | HEART RATE: 87 BPM | BODY MASS INDEX: 34.65 KG/M2 | TEMPERATURE: 98.9 F

## 2019-12-12 DIAGNOSIS — R10.9 ABDOMINAL PAIN, UNSPECIFIED ABDOMINAL LOCATION: ICD-10-CM

## 2019-12-12 DIAGNOSIS — N39.0 ACUTE UTI (URINARY TRACT INFECTION): Primary | ICD-10-CM

## 2019-12-12 LAB
APPEARANCE FLUID: CLEAR
BILIRUBIN, POC: ABNORMAL
BLOOD URINE, POC: ABNORMAL
CLARITY, POC: CLEAR
COLOR, POC: YELLOW
GLUCOSE URINE, POC: ABNORMAL
KETONES, POC: ABNORMAL
LEUKOCYTE EST, POC: ABNORMAL
NITRITE, POC: ABNORMAL
PH, POC: 5.5
PROTEIN, POC: ABNORMAL
SPECIFIC GRAVITY, POC: 1.02
UROBILINOGEN, POC: 0.2

## 2019-12-12 PROCEDURE — 81002 URINALYSIS NONAUTO W/O SCOPE: CPT | Performed by: NURSE PRACTITIONER

## 2019-12-12 PROCEDURE — 99213 OFFICE O/P EST LOW 20 MIN: CPT | Performed by: NURSE PRACTITIONER

## 2019-12-12 RX ORDER — NITROFURANTOIN 25; 75 MG/1; MG/1
100 CAPSULE ORAL 2 TIMES DAILY
Qty: 20 CAPSULE | Refills: 0 | Status: SHIPPED | OUTPATIENT
Start: 2019-12-12 | End: 2019-12-22

## 2019-12-12 ASSESSMENT — ENCOUNTER SYMPTOMS
VOMITING: 0
RHINORRHEA: 0
ABDOMINAL PAIN: 1
DIARRHEA: 0
SORE THROAT: 0
NAUSEA: 0
COUGH: 0
SHORTNESS OF BREATH: 0

## 2019-12-14 LAB — URINE CULTURE, ROUTINE: NORMAL

## 2019-12-18 ENCOUNTER — OFFICE VISIT (OUTPATIENT)
Dept: GASTROENTEROLOGY | Facility: CLINIC | Age: 19
End: 2019-12-18

## 2019-12-18 ENCOUNTER — LAB (OUTPATIENT)
Dept: LAB | Facility: HOSPITAL | Age: 19
End: 2019-12-18

## 2019-12-18 VITALS
SYSTOLIC BLOOD PRESSURE: 130 MMHG | DIASTOLIC BLOOD PRESSURE: 98 MMHG | BODY MASS INDEX: 35.07 KG/M2 | HEART RATE: 74 BPM | OXYGEN SATURATION: 98 % | HEIGHT: 70 IN | TEMPERATURE: 95.9 F | WEIGHT: 245 LBS

## 2019-12-18 DIAGNOSIS — K76.0 FATTY LIVER: ICD-10-CM

## 2019-12-18 DIAGNOSIS — K76.0 FATTY LIVER: Primary | ICD-10-CM

## 2019-12-18 DIAGNOSIS — R79.89 ELEVATED LFTS: ICD-10-CM

## 2019-12-18 DIAGNOSIS — Z83.49 FAMILY HISTORY OF HEMOCHROMATOSIS: ICD-10-CM

## 2019-12-18 LAB
ALBUMIN SERPL-MCNC: 4.6 G/DL (ref 3.5–5.2)
ALBUMIN/GLOB SERPL: 1.5 G/DL
ALP SERPL-CCNC: 48 U/L (ref 39–117)
ALPHA-FETOPROTEIN: 0.93 NG/ML (ref 0–8.3)
ALPHA1 GLOB MFR UR ELPH: 170 MG/DL (ref 90–200)
ALT SERPL W P-5'-P-CCNC: 75 U/L (ref 1–33)
ANION GAP SERPL CALCULATED.3IONS-SCNC: 12.5 MMOL/L (ref 5–15)
AST SERPL-CCNC: 30 U/L (ref 1–32)
BILIRUB SERPL-MCNC: 0.5 MG/DL (ref 0.2–1.2)
BUN BLD-MCNC: 8 MG/DL (ref 6–20)
BUN/CREAT SERPL: 12.7 (ref 7–25)
CALCIUM SPEC-SCNC: 9.5 MG/DL (ref 8.6–10.5)
CERULOPLASMIN SERPL-MCNC: 34 MG/DL (ref 19–39)
CHLORIDE SERPL-SCNC: 102 MMOL/L (ref 98–107)
CO2 SERPL-SCNC: 24.5 MMOL/L (ref 22–29)
CREAT BLD-MCNC: 0.63 MG/DL (ref 0.57–1)
FERRITIN SERPL-MCNC: 172 NG/ML (ref 13–150)
GFR SERPL CREATININE-BSD FRML MDRD: 120 ML/MIN/1.73
GGT SERPL-CCNC: 69 U/L (ref 5–36)
GLOBULIN UR ELPH-MCNC: 3.1 GM/DL
GLUCOSE BLD-MCNC: 90 MG/DL (ref 65–99)
HAV IGM SERPL QL IA: NORMAL
HBV CORE IGM SERPL QL IA: NORMAL
HBV SURFACE AG SERPL QL IA: NORMAL
HCV AB SER DONR QL: NORMAL
HIV1+2 AB SER QL: NORMAL
HOLD SPECIMEN: NORMAL
INR PPP: 0.94 (ref 0.91–1.09)
IRON 24H UR-MRATE: 143 MCG/DL (ref 37–145)
IRON SATN MFR SERPL: 35 % (ref 20–50)
POTASSIUM BLD-SCNC: 4.1 MMOL/L (ref 3.5–5.2)
PROT SERPL-MCNC: 7.7 G/DL (ref 6–8.5)
PROTHROMBIN TIME: 12.8 SECONDS (ref 11.9–14.6)
SODIUM BLD-SCNC: 139 MMOL/L (ref 136–145)
TIBC SERPL-MCNC: 410 MCG/DL (ref 298–536)
TRANSFERRIN SERPL-MCNC: 275 MG/DL (ref 200–360)

## 2019-12-18 PROCEDURE — 83516 IMMUNOASSAY NONANTIBODY: CPT | Performed by: NURSE PRACTITIONER

## 2019-12-18 PROCEDURE — G0432 EIA HIV-1/HIV-2 SCREEN: HCPCS | Performed by: NURSE PRACTITIONER

## 2019-12-18 PROCEDURE — 86038 ANTINUCLEAR ANTIBODIES: CPT | Performed by: NURSE PRACTITIONER

## 2019-12-18 PROCEDURE — 80074 ACUTE HEPATITIS PANEL: CPT | Performed by: NURSE PRACTITIONER

## 2019-12-18 PROCEDURE — 36415 COLL VENOUS BLD VENIPUNCTURE: CPT

## 2019-12-18 PROCEDURE — 99214 OFFICE O/P EST MOD 30 MIN: CPT | Performed by: NURSE PRACTITIONER

## 2019-12-18 PROCEDURE — 86255 FLUORESCENT ANTIBODY SCREEN: CPT | Performed by: NURSE PRACTITIONER

## 2019-12-18 PROCEDURE — 82390 ASSAY OF CERULOPLASMIN: CPT | Performed by: NURSE PRACTITIONER

## 2019-12-18 PROCEDURE — 85610 PROTHROMBIN TIME: CPT | Performed by: NURSE PRACTITIONER

## 2019-12-18 PROCEDURE — 82103 ALPHA-1-ANTITRYPSIN TOTAL: CPT | Performed by: NURSE PRACTITIONER

## 2019-12-18 PROCEDURE — 84466 ASSAY OF TRANSFERRIN: CPT | Performed by: NURSE PRACTITIONER

## 2019-12-18 PROCEDURE — 82728 ASSAY OF FERRITIN: CPT | Performed by: NURSE PRACTITIONER

## 2019-12-18 PROCEDURE — 80053 COMPREHEN METABOLIC PANEL: CPT | Performed by: NURSE PRACTITIONER

## 2019-12-18 PROCEDURE — 83540 ASSAY OF IRON: CPT | Performed by: NURSE PRACTITIONER

## 2019-12-18 PROCEDURE — 82977 ASSAY OF GGT: CPT | Performed by: NURSE PRACTITIONER

## 2019-12-18 PROCEDURE — 82105 ALPHA-FETOPROTEIN SERUM: CPT | Performed by: NURSE PRACTITIONER

## 2019-12-18 RX ORDER — LEVOTHYROXINE SODIUM 0.12 MG/1
50 TABLET ORAL DAILY
COMMUNITY

## 2019-12-18 RX ORDER — PROPRANOLOL HYDROCHLORIDE 20 MG/1
40 TABLET ORAL 2 TIMES DAILY
COMMUNITY

## 2019-12-18 RX ORDER — MONTELUKAST SODIUM 10 MG/1
10 TABLET ORAL DAILY
Refills: 0 | COMMUNITY
Start: 2019-10-07

## 2019-12-18 NOTE — PROGRESS NOTES
Macy Hernandez  2000    12/18/2019  Chief Complaint   Patient presents with   • Elevated Hepatic Enzymes     Subjective   HPI  Macy Hernandez is a 19 y.o. female who presents with a history of elevated LFT's and fatty liver, course constant ongoing for the past year.  She is a nursing student.  Denies any etoh.  No known liver diseases prior to being told she had fatty liver earlier this year.  Labs on routine physical exam reveal elevated LFT's first seen September 2018 at which time her ALT was 76 with normal LFT's otherwise.  Then October 2019 her ALT was 109 AST 51 (with Bile and ALKP remaining normal.  Abdominal ultrasound 8/13/2019 revealed evidence of fatty liver.  Since that time she has lost 25 pounds but her labs remain elevated.  Labs on 12/4/2019 with AST42 .  She again avoid etoh, tylenol.  Her uncle did have hemochromatosis.  Otherwise no known family liver diseases.    Past Medical History:   Diagnosis Date   • Anxiety     Anxiety Disorder, Generalized   • Hypothyroid      Past Surgical History:   Procedure Laterality Date   • WISDOM TOOTH EXTRACTION         Outpatient Medications Marked as Taking for the 12/18/19 encounter (Office Visit) with Kyung Oreilly APRN   Medication Sig Dispense Refill   • levothyroxine (SYNTHROID, LEVOTHROID) 125 MCG tablet Take  by mouth Daily.     • montelukast (SINGULAIR) 10 MG tablet Take 10 mg by mouth Daily.  0   • propranolol (INDERAL) 20 MG tablet Take  by mouth Daily.       No Known Allergies  Social History     Socioeconomic History   • Marital status: Single     Spouse name: Not on file   • Number of children: Not on file   • Years of education: Not on file   • Highest education level: Not on file   Tobacco Use   • Smoking status: Never Smoker   • Smokeless tobacco: Never Used   Substance and Sexual Activity   • Alcohol use: No   • Drug use: No   • Sexual activity: Defer   Social History Narrative    Child does not have Bleeding or Bruising  Disorder    Not Exposed to Second Hand Smoke     Family History   Problem Relation Age of Onset   • Heart disease Other    • Heart disease Maternal Grandmother    • Heart disease Maternal Grandfather    • Heart disease Paternal Grandmother    • Heart disease Paternal Grandfather    • Colon polyps Paternal Grandfather    • Colon cancer Neg Hx      Health Maintenance   Topic Date Due   • ANNUAL PHYSICAL  03/31/2002   • HPV VACCINES (1 - Female 2-dose series) 03/31/2010   • TDAP/TD VACCINES (1 - Tdap) 03/31/2010   • CHLAMYDIA SCREENING  08/25/2017   • INFLUENZA VACCINE  Completed   • MENINGOCOCCAL VACCINE (Normal Risk)  Aged Out     Review of Systems   Constitutional: Negative for activity change, appetite change, chills, diaphoresis, fatigue, fever and unexpected weight change.   HENT: Negative for ear pain, hearing loss, mouth sores, sore throat, trouble swallowing and voice change.    Eyes: Negative.    Respiratory: Negative for cough, choking, shortness of breath and wheezing.    Cardiovascular: Negative for chest pain and palpitations.   Gastrointestinal: Negative for abdominal pain, blood in stool, constipation, diarrhea, nausea and vomiting.   Endocrine: Negative for cold intolerance and heat intolerance.   Genitourinary: Negative for decreased urine volume, dysuria, frequency, hematuria and urgency.   Musculoskeletal: Negative for back pain, gait problem and myalgias.   Skin: Negative for color change, pallor and rash.   Allergic/Immunologic: Negative for food allergies and immunocompromised state.   Neurological: Negative for dizziness, tremors, seizures, syncope, weakness, light-headedness, numbness and headaches.   Hematological: Negative for adenopathy. Does not bruise/bleed easily.   Psychiatric/Behavioral: Negative for agitation and confusion. The patient is not nervous/anxious.    All other systems reviewed and are negative.    Objective   Vitals:    12/18/19 0947   BP: 130/98   Pulse: 74   Temp: 95.9 °F  "(35.5 °C)   SpO2: 98%   Weight: 111 kg (245 lb)   Height: 177.8 cm (70\")     Body mass index is 35.15 kg/m².  Physical Exam   Constitutional: She is oriented to person, place, and time. She appears well-developed and well-nourished.   HENT:   Head: Normocephalic and atraumatic.   Eyes: Pupils are equal, round, and reactive to light.   Neck: Normal range of motion. Neck supple. No tracheal deviation present.   Cardiovascular: Normal rate, regular rhythm and normal heart sounds. Exam reveals no gallop and no friction rub.   No murmur heard.  Pulmonary/Chest: Effort normal and breath sounds normal. No respiratory distress. She has no wheezes. She has no rales. She exhibits no tenderness.   Abdominal: Soft. Bowel sounds are normal. She exhibits no distension. There is no hepatosplenomegaly. There is no tenderness. There is no rigidity, no rebound and no guarding.   Musculoskeletal: Normal range of motion. She exhibits no edema, tenderness or deformity.   Neurological: She is alert and oriented to person, place, and time. She has normal reflexes.   Skin: Skin is warm and dry. No rash noted. No pallor.   Psychiatric: She has a normal mood and affect. Her behavior is normal. Judgment and thought content normal.     Assessment/Plan   Macy was seen today for elevated hepatic enzymes.    Diagnoses and all orders for this visit:    Fatty liver  -     AFP tumor marker; Future  -     Alkaline phosphatase; Future  -     Alpha-1-antitrypsin; Future  -     CASSIUS; Future  -     Anti-smooth muscle antibody titer; Future  -     Ceruloplasmin; Future  -     Comprehensive metabolic panel; Future  -     Endomysial antibody, IgA titer; Future  -     Ferritin; Future  -     Gamma GT; Future  -     Hepatitis panel, acute; Future  -     HIV-1 and HIV-2 antibodies; Future  -     Iron and TIBC; Future  -     Mitochondrial antibodies, M2; Future  -     Protime-INR; Future    Elevated LFTs  Comments:  Hepatocellular in nature  Labs: 12/4/19 AST " 42          10/11/19: AST 51   Orders:  -     AFP tumor marker; Future  -     Alkaline phosphatase; Future  -     Alpha-1-antitrypsin; Future  -     CASSIUS; Future  -     Anti-smooth muscle antibody titer; Future  -     Ceruloplasmin; Future  -     Comprehensive metabolic panel; Future  -     Endomysial antibody, IgA titer; Future  -     Ferritin; Future  -     Gamma GT; Future  -     Hepatitis panel, acute; Future  -     HIV-1 and HIV-2 antibodies; Future  -     Iron and TIBC; Future  -     Mitochondrial antibodies, M2; Future  -     Protime-INR; Future    Family history of hemochromatosis  Comments:  uncle      * Surgery not found *  Part of this note may be an electronic transcription/translation of spoken language to printed text using the Dragon Dictation System.  Body mass index is 35.15 kg/m².  Return in about 1 month (around 1/18/2020).    Patient's Body mass index is 35.15 kg/m². BMI is above normal parameters. Recommendations include: nutrition counseling.  Today have spent much time discussing with the patient and her mother who is at her side, details regarding elevated LFTs.  It appears to be hepatocellular in nature.  Ultrasound is consistent with fatty liver.  I have encouraged her to continue working on her weight loss and hopefully as she loses weight her LFTs will trend down as well.  I am going to rule out other sources for her elevated LFTs given the fact after 25 pounds of weight have been lost her transaminases were unchanged.  We will do a full liver work-up today with lab work.  She is avoid alcohol.  I will see her back in 1 month for reevaluation and further recommendation.    All risks, benefits, alternatives, and indications of colonoscopy and/or Endoscopy procedure have been discussed with the patient. Risks to include perforation of the colon requiring possible surgery or colostomy, risk of bleeding from biopsies or removal of colon tissue, possibility of missing a colon  polyp or cancer, or adverse drug reaction.  Benefits to include the diagnosis and management of disease of the colon and rectum. Alternatives to include barium enema, radiographic evaluation, lab testing or no intervention. Pt verbalizes understanding and agrees.     Kyungcatarino Oreilly, APRN  12/18/2019  10:57 AM      Obesity, Adult  Obesity is the condition of having too much total body fat. Being overweight or obese means that your weight is greater than what is considered healthy for your body size. Obesity is determined by a measurement called BMI. BMI is an estimate of body fat and is calculated from height and weight. For adults, a BMI of 30 or higher is considered obese.  Obesity can eventually lead to other health concerns and major illnesses, including:  · Stroke.  · Coronary artery disease (CAD).  · Type 2 diabetes.  · Some types of cancer, including cancers of the colon, breast, uterus, and gallbladder.  · Osteoarthritis.  · High blood pressure (hypertension).  · High cholesterol.  · Sleep apnea.  · Gallbladder stones.  · Infertility problems.  What are the causes?  The main cause of obesity is taking in (consuming) more calories than your body uses for energy. Other factors that contribute to this condition may include:  · Being born with genes that make you more likely to become obese.  · Having a medical condition that causes obesity. These conditions include:  ¨ Hypothyroidism.  ¨ Polycystic ovarian syndrome (PCOS).  ¨ Binge-eating disorder.  ¨ Cushing syndrome.  · Taking certain medicines, such as steroids, antidepressants, and seizure medicines.  · Not being physically active (sedentary lifestyle).  · Living where there are limited places to exercise safely or buy healthy foods.  · Not getting enough sleep.  What increases the risk?  The following factors may increase your risk of this condition:  · Having a family history of obesity.  · Being a woman of -American descent.  · Being a man of   descent.  What are the signs or symptoms?  Having excessive body fat is the main symptom of this condition.  How is this diagnosed?  This condition may be diagnosed based on:  · Your symptoms.  · Your medical history.  · A physical exam. Your health care provider may measure:  ¨ Your BMI. If you are an adult with a BMI between 25 and less than 30, you are considered overweight. If you are an adult with a BMI of 30 or higher, you are considered obese.  ¨ The distances around your hips and your waist (circumferences). These may be compared to each other to help diagnose your condition.  ¨ Your skinfold thickness. Your health care provider may gently pinch a fold of your skin and measure it.  How is this treated?  Treatment for this condition often includes changing your lifestyle. Treatment may include some or all of the following:  · Dietary changes. Work with your health care provider and a dietitian to set a weight-loss goal that is healthy and reasonable for you. Dietary changes may include eating:  ¨ Smaller portions. A portion size is the amount of a particular food that is healthy for you to eat at one time. This varies from person to person.  ¨ Low-calorie or low-fat options.  ¨ More whole grains, fruits, and vegetables.  · Regular physical activity. This may include aerobic activity (cardio) and strength training.  · Medicine to help you lose weight. Your health care provider may prescribe medicine if you are unable to lose 1 pound a week after 6 weeks of eating more healthily and doing more physical activity.  · Surgery. Surgical options may include gastric banding and gastric bypass. Surgery may be done if:  ¨ Other treatments have not helped to improve your condition.  ¨ You have a BMI of 40 or higher.  ¨ You have life-threatening health problems related to obesity.  Follow these instructions at home:     Eating and drinking     · Follow recommendations from your health care provider about what  you eat and drink. Your health care provider may advise you to:  ¨ Limit fast foods, sweets, and processed snack foods.  ¨ Choose low-fat options, such as low-fat milk instead of whole milk.  ¨ Eat 5 or more servings of fruits or vegetables every day.  ¨ Eat at home more often. This gives you more control over what you eat.  ¨ Choose healthy foods when you eat out.  ¨ Learn what a healthy portion size is.  ¨ Keep low-fat snacks on hand.  ¨ Avoid sugary drinks, such as soda, fruit juice, iced tea sweetened with sugar, and flavored milk.  ¨ Eat a healthy breakfast.  · Drink enough water to keep your urine clear or pale yellow.  · Do not go without eating for long periods of time (do not fast) or follow a fad diet. Fasting and fad diets can be unhealthy and even dangerous.  Physical Activity   · Exercise regularly, as told by your health care provider. Ask your health care provider what types of exercise are safe for you and how often you should exercise.  · Warm up and stretch before being active.  · Cool down and stretch after being active.  · Rest between periods of activity.  Lifestyle   · Limit the time that you spend in front of your TV, computer, or video game system.  · Find ways to reward yourself that do not involve food.  · Limit alcohol intake to no more than 1 drink a day for nonpregnant women and 2 drinks a day for men. One drink equals 12 oz of beer, 5 oz of wine, or 1½ oz of hard liquor.  General instructions   · Keep a weight loss journal to keep track of the food you eat and how much you exercise you get.  · Take over-the-counter and prescription medicines only as told by your health care provider.  · Take vitamins and supplements only as told by your health care provider.  · Consider joining a support group. Your health care provider may be able to recommend a support group.  · Keep all follow-up visits as told by your health care provider. This is important.  Contact a health care provider  if:  · You are unable to meet your weight loss goal after 6 weeks of dietary and lifestyle changes.  This information is not intended to replace advice given to you by your health care provider. Make sure you discuss any questions you have with your health care provider.  Document Released: 01/25/2006 Document Revised: 05/22/2017 Document Reviewed: 10/05/2016  [employer] Interactive Patient Education © 2017 [employer]      If you smoke or use tobacco, 4 minutes reading provided  Steps to Quit Smoking  Smoking tobacco can be harmful to your health and can affect almost every organ in your body. Smoking puts you, and those around you, at risk for developing many serious chronic diseases. Quitting smoking is difficult, but it is one of the best things that you can do for your health. It is never too late to quit.  What are the benefits of quitting smoking?  When you quit smoking, you lower your risk of developing serious diseases and conditions, such as:  · Lung cancer or lung disease, such as COPD.  · Heart disease.  · Stroke.  · Heart attack.  · Infertility.  · Osteoporosis and bone fractures.  Additionally, symptoms such as coughing, wheezing, and shortness of breath may get better when you quit. You may also find that you get sick less often because your body is stronger at fighting off colds and infections. If you are pregnant, quitting smoking can help to reduce your chances of having a baby of low birth weight.  How do I get ready to quit?  When you decide to quit smoking, create a plan to make sure that you are successful. Before you quit:  · Pick a date to quit. Set a date within the next two weeks to give you time to prepare.  · Write down the reasons why you are quitting. Keep this list in places where you will see it often, such as on your bathroom mirror or in your car or wallet.  · Identify the people, places, things, and activities that make you want to smoke (triggers) and avoid them. Make sure to take  these actions:  ¨ Throw away all cigarettes at home, at work, and in your car.  ¨ Throw away smoking accessories, such as ashtrays and lighters.  ¨ Clean your car and make sure to empty the ashtray.  ¨ Clean your home, including curtains and carpets.  · Tell your family, friends, and coworkers that you are quitting. Support from your loved ones can make quitting easier.  · Talk with your health care provider about your options for quitting smoking.  · Find out what treatment options are covered by your health insurance.  What strategies can I use to quit smoking?  Talk with your healthcare provider about different strategies to quit smoking. Some strategies include:  · Quitting smoking altogether instead of gradually lessening how much you smoke over a period of time. Research shows that quitting “cold turkey” is more successful than gradually quitting.  · Attending in-person counseling to help you build problem-solving skills. You are more likely to have success in quitting if you attend several counseling sessions. Even short sessions of 10 minutes can be effective.  · Finding resources and support systems that can help you to quit smoking and remain smoke-free after you quit. These resources are most helpful when you use them often. They can include:  ¨ Online chats with a counselor.  ¨ Telephone quitlines.  ¨ Printed self-help materials.  ¨ Support groups or group counseling.  ¨ Text messaging programs.  ¨ Mobile phone applications.  · Taking medicines to help you quit smoking. (If you are pregnant or breastfeeding, talk with your health care provider first.) Some medicines contain nicotine and some do not. Both types of medicines help with cravings, but the medicines that include nicotine help to relieve withdrawal symptoms. Your health care provider may recommend:  ¨ Nicotine patches, gum, or lozenges.  ¨ Nicotine inhalers or sprays.  ¨ Non-nicotine medicine that is taken by mouth.  Talk with your health care  provider about combining strategies, such as taking medicines while you are also receiving in-person counseling. Using these two strategies together makes you more likely to succeed in quitting than if you used either strategy on its own.  If you are pregnant or breastfeeding, talk with your health care provider about finding counseling or other support strategies to quit smoking. Do not take medicine to help you quit smoking unless told to do so by your health care provider.  What things can I do to make it easier to quit?  Quitting smoking might feel overwhelming at first, but there is a lot that you can do to make it easier. Take these important actions:  · Reach out to your family and friends and ask that they support and encourage you during this time. Call telephone quitlines, reach out to support groups, or work with a counselor for support.  · Ask people who smoke to avoid smoking around you.  · Avoid places that trigger you to smoke, such as bars, parties, or smoke-break areas at work.  · Spend time around people who do not smoke.  · Lessen stress in your life, because stress can be a smoking trigger for some people. To lessen stress, try:  ¨ Exercising regularly.  ¨ Deep-breathing exercises.  ¨ Yoga.  ¨ Meditating.  ¨ Performing a body scan. This involves closing your eyes, scanning your body from head to toe, and noticing which parts of your body are particularly tense. Purposefully relax the muscles in those areas.  · Download or purchase mobile phone or tablet apps (applications) that can help you stick to your quit plan by providing reminders, tips, and encouragement. There are many free apps, such as QuitGuide from the CDC (Centers for Disease Control and Prevention). You can find other support for quitting smoking (smoking cessation) through smokefree.gov and other websites.  How will I feel when I quit smoking?  Within the first 24 hours of quitting smoking, you may start to feel some withdrawal  symptoms. These symptoms are usually most noticeable 2-3 days after quitting, but they usually do not last beyond 2-3 weeks. Changes or symptoms that you might experience include:  · Mood swings.  · Restlessness, anxiety, or irritation.  · Difficulty concentrating.  · Dizziness.  · Strong cravings for sugary foods in addition to nicotine.  · Mild weight gain.  · Constipation.  · Nausea.  · Coughing or a sore throat.  · Changes in how your medicines work in your body.  · A depressed mood.  · Difficulty sleeping (insomnia).  After the first 2-3 weeks of quitting, you may start to notice more positive results, such as:  · Improved sense of smell and taste.  · Decreased coughing and sore throat.  · Slower heart rate.  · Lower blood pressure.  · Clearer skin.  · The ability to breathe more easily.  · Fewer sick days.  Quitting smoking is very challenging for most people. Do not get discouraged if you are not successful the first time. Some people need to make many attempts to quit before they achieve long-term success. Do your best to stick to your quit plan, and talk with your health care provider if you have any questions or concerns.  This information is not intended to replace advice given to you by your health care provider. Make sure you discuss any questions you have with your health care provider.  Document Released: 12/12/2002 Document Revised: 08/15/2017 Document Reviewed: 05/03/2016  [employer] Interactive Patient Education © 2017 [employer]

## 2019-12-19 LAB
ACTIN IGG SERPL-ACNC: 10 UNITS (ref 0–19)
ANA SER QL: NEGATIVE
DEPRECATED MITOCHONDRIA M2 IGG SER-ACNC: <20 UNITS (ref 0–20)

## 2019-12-23 DIAGNOSIS — R79.89 ELEVATED FERRITIN LEVEL: Primary | ICD-10-CM

## 2019-12-23 DIAGNOSIS — R79.89 ELEVATED LFTS: ICD-10-CM

## 2019-12-23 DIAGNOSIS — Z83.49 FAMILY HISTORY OF HEMOCHROMATOSIS: ICD-10-CM

## 2019-12-23 LAB
ENDOMYSIAL ANTIBODY TITER IGA: NORMAL TITER
ENDOMYSIAL ANTIBODY TITER IGG: NORMAL TITER

## 2020-01-08 ENCOUNTER — APPOINTMENT (OUTPATIENT)
Dept: LAB | Facility: HOSPITAL | Age: 20
End: 2020-01-08

## 2020-01-08 PROCEDURE — 81256 HFE GENE: CPT | Performed by: NURSE PRACTITIONER

## 2020-01-08 PROCEDURE — 36415 COLL VENOUS BLD VENIPUNCTURE: CPT | Performed by: NURSE PRACTITIONER

## 2020-01-14 LAB — HFE GENE MUT ANL BLD/T: NORMAL

## 2020-01-19 ENCOUNTER — APPOINTMENT (OUTPATIENT)
Dept: GENERAL RADIOLOGY | Facility: HOSPITAL | Age: 20
End: 2020-01-19

## 2020-01-19 ENCOUNTER — HOSPITAL ENCOUNTER (EMERGENCY)
Facility: HOSPITAL | Age: 20
Discharge: HOME | End: 2020-01-19
Attending: INTERNAL MEDICINE | Admitting: INTERNAL MEDICINE

## 2020-01-19 VITALS
DIASTOLIC BLOOD PRESSURE: 84 MMHG | OXYGEN SATURATION: 100 % | SYSTOLIC BLOOD PRESSURE: 139 MMHG | WEIGHT: 240 LBS | BODY MASS INDEX: 34.36 KG/M2 | TEMPERATURE: 97.9 F | HEIGHT: 70 IN | HEART RATE: 72 BPM | RESPIRATION RATE: 16 BRPM

## 2020-01-19 DIAGNOSIS — S93.402A SPRAIN OF LEFT ANKLE, UNSPECIFIED LIGAMENT, INITIAL ENCOUNTER: Primary | ICD-10-CM

## 2020-01-19 PROCEDURE — 73610 X-RAY EXAM OF ANKLE: CPT

## 2020-01-19 PROCEDURE — 99283 EMERGENCY DEPT VISIT LOW MDM: CPT

## 2020-01-20 NOTE — ED NOTES
PATIENT COMPLAINS OF LEFT ANKLE/FOOT PAIN, DENIES OTHER INJURIES.      Kaylee Hickman, RN  01/19/20 1925

## 2020-01-20 NOTE — ED NOTES
ACE WRAP IN PLACE, CTMSP REMAINS WNL TO LEFT LOWER EXTREMITY.  PATIENT TO BE DISCHARGED HOME, NO ACUTE DISTRESS NOTED AT THIS TIME.      Kaylee Hickman, RN  01/19/20 2015

## 2020-01-20 NOTE — ED PROVIDER NOTES
Subjective   Ms. Sanchez is a very pleasant 19-year-old female who was leaving [employer] earlier tonight whenever she missed stepped and rolled her ankle to the outside she had an acute onset of left ankle pain pain with weightbearing the pain is on the lateral aspect of her ankle is constant duration aching in sensation and worse with weightbearing.          Review of Systems   Constitutional: Negative for chills, fatigue and fever.   HENT: Negative for congestion and facial swelling.    Eyes: Negative for photophobia, discharge and visual disturbance.   Respiratory: Negative for cough, shortness of breath and wheezing.    Cardiovascular: Negative for chest pain, palpitations and leg swelling.   Gastrointestinal: Negative for abdominal pain, diarrhea, nausea and vomiting.   Endocrine: Negative for cold intolerance and heat intolerance.   Genitourinary: Negative for difficulty urinating and urgency.   Musculoskeletal: Positive for arthralgias ( Left ankle pain with range of motion). Negative for joint swelling and myalgias.   Skin: Negative for color change and pallor.   Neurological: Negative for dizziness and light-headedness.   Hematological: Negative for adenopathy. Does not bruise/bleed easily.   Psychiatric/Behavioral: Negative for agitation, behavioral problems and confusion.       Past Medical History:   Diagnosis Date   • Anxiety     Anxiety Disorder, Generalized   • Hypothyroid        No Known Allergies    Past Surgical History:   Procedure Laterality Date   • WISDOM TOOTH EXTRACTION         Family History   Problem Relation Age of Onset   • Heart disease Other    • Heart disease Maternal Grandmother    • Heart disease Maternal Grandfather    • Heart disease Paternal Grandmother    • Heart disease Paternal Grandfather    • Colon polyps Paternal Grandfather    • Colon cancer Neg Hx        Social History     Socioeconomic History   • Marital status: Single     Spouse name: Not on file   • Number of children: Not  on file   • Years of education: Not on file   • Highest education level: Not on file   Tobacco Use   • Smoking status: Never Smoker   • Smokeless tobacco: Never Used   Substance and Sexual Activity   • Alcohol use: No   • Drug use: No   • Sexual activity: Defer   Social History Narrative    Child does not have Bleeding or Bruising Disorder    Not Exposed to Second Hand Smoke           Objective   Physical Exam   Constitutional: She is oriented to person, place, and time. She appears well-developed and well-nourished.   HENT:   Head: Normocephalic and atraumatic.   Mouth/Throat: Oropharynx is clear and moist.   Eyes: Pupils are equal, round, and reactive to light. Conjunctivae and EOM are normal.   Neck: Normal range of motion. Neck supple.   Cardiovascular: Normal rate, regular rhythm, normal heart sounds and intact distal pulses.   Pulmonary/Chest: Effort normal and breath sounds normal.   Abdominal: Soft. Bowel sounds are normal. She exhibits no distension.   Musculoskeletal: Normal range of motion. She exhibits no edema. Tenderness:  Left ankle tenderness to palpation and with range of motion especially with eversion.   Neurological: She is alert and oriented to person, place, and time. No cranial nerve deficit.   Skin: Skin is warm and dry.   Psychiatric: She has a normal mood and affect. Her behavior is normal. Thought content normal.   Nursing note and vitals reviewed.      Procedures           ED Course                      Kyrie Coma Scale Score: 15                          MDM    Final diagnoses:   Sprain of left ankle, unspecified ligament, initial encounter            Anuel Stokes MD  01/19/20 1958

## 2020-01-20 NOTE — DISCHARGE INSTRUCTIONS
Ankle Sprain    An ankle sprain is a stretch or tear in one of the tough tissues (ligaments) that connect the bones in your ankle. An ankle sprain can happen when the ankle rolls outward (inversion sprain) or inward (eversion sprain).  What are the causes?  This condition is caused by rolling or twisting the ankle.  What increases the risk?  You are more likely to develop this condition if you play sports.  What are the signs or symptoms?  Symptoms of this condition include:  · Pain in your ankle.  · Swelling.  · Bruising. This may happen right after you sprain your ankle or 1-2 days later.  · Trouble standing or walking.  How is this diagnosed?  This condition is diagnosed with:  · A physical exam. During the exam, your doctor will press on certain parts of your foot and ankle and try to move them in certain ways.  · X-ray imaging. These may be taken to see how bad the sprain is and to check for broken bones.  How is this treated?  This condition may be treated with:  · A brace or splint. This is used to keep the ankle from moving until it heals.  · An elastic bandage. This is used to support the ankle.  · Crutches.  · Pain medicine.  · Surgery. This may be needed if the sprain is very bad.  · Physical therapy. This may help to improve movement in the ankle.  Follow these instructions at home:  If you have a brace or a splint:  · Wear the brace or splint as told by your doctor. Remove it only as told by your doctor.  · Loosen the brace or splint if your toes:  ? Tingle.  ? Lose feeling (become numb).  ? Turn cold and blue.  · Keep the brace or splint clean.  · If the brace or splint is not waterproof:  ? Do not let it get wet.  ? Cover it with a watertight covering when you take a bath or a shower.  If you have an elastic bandage (dressing):  · Remove it to shower or bathe.  · Try not to move your ankle much, but wiggle your toes from time to time. This helps to prevent swelling.  · Adjust the dressing if it feels  too tight.  · Loosen the dressing if your foot:  ? Loses feeling.  ? Tingles.  ? Becomes cold and blue.  Managing pain, stiffness, and swelling    · Take over-the-counter and prescription medicines only as told by doctor.  · For 2-3 days, keep your ankle raised (elevated) above the level of your heart.  · If told, put ice on the injured area:  ? If you have a removable brace or splint, remove it as told by your doctor.  ? Put ice in a plastic bag.  ? Place a towel between your skin and the bag.  ? Leave the ice on for 20 minutes, 2-3 times a day.  General instructions  · Rest your ankle.  · Do not use your injured leg to support your body weight until your doctor says that you can. Use crutches as told by your doctor.  · Do not use any products that contain nicotine or tobacco, such as cigarettes, e-cigarettes, and chewing tobacco. If you need help quitting, ask your doctor.  · Keep all follow-up visits as told by your doctor.  Contact a doctor if:  · Your bruises or swelling are quickly getting worse.  · Your pain does not get better after you take medicine.  Get help right away if:  · You cannot feel your toes or foot.  · Your foot or toes look blue.  · You have very bad pain that gets worse.  Summary  · An ankle sprain is a stretch or tear in one of the tough tissues (ligaments) that connect the bones in your ankle.  · This condition is caused by rolling or twisting the ankle.  · Symptoms include pain, swelling, bruising, and trouble walking.  · To help with pain and swelling, put ice on the injured ankle, raise your ankle above the level of your heart, and use an elastic bandage. Also, rest as told by your doctor.  · Keep all follow-up visits as told by your doctor. This is important.  This information is not intended to replace advice given to you by your health care provider. Make sure you discuss any questions you have with your health care provider.  Document Released: 06/05/2009 Document Revised: 05/14/2019  Document Reviewed: 05/14/2019  [employer] Interactive Patient Education © 2019 [employer]

## 2020-01-23 ENCOUNTER — OFFICE VISIT (OUTPATIENT)
Dept: GASTROENTEROLOGY | Facility: CLINIC | Age: 20
End: 2020-01-23

## 2020-01-23 VITALS
SYSTOLIC BLOOD PRESSURE: 122 MMHG | DIASTOLIC BLOOD PRESSURE: 80 MMHG | HEART RATE: 80 BPM | WEIGHT: 245 LBS | BODY MASS INDEX: 35.07 KG/M2 | HEIGHT: 70 IN | OXYGEN SATURATION: 98 %

## 2020-01-23 DIAGNOSIS — K76.0 FATTY LIVER: Primary | ICD-10-CM

## 2020-01-23 DIAGNOSIS — R79.89 ELEVATED LFTS: ICD-10-CM

## 2020-01-23 DIAGNOSIS — Z83.49 FAMILY HISTORY OF HEMOCHROMATOSIS: ICD-10-CM

## 2020-01-23 PROCEDURE — 99213 OFFICE O/P EST LOW 20 MIN: CPT | Performed by: NURSE PRACTITIONER

## 2020-01-23 NOTE — PROGRESS NOTES
Macy Hernandez  2000      1/23/2020  Chief Complaint   Patient presents with   • GI Problem     Here to discuss elevated liver functions         HPI    Macy Hernnadez is a  19 y.o. female here for a follow up visit for elevated LFT's.  At this point we suspect all the elevated LFT's are secondary to fatty liver.  Liver ultrasound on 8/19/2019 revealed findings consistent with fatty liver.  Labs are improving with weight loss.  [facility] recent LFT's on 12/18/19 the elevations was her ALT 75 with the AST, Bili and ALKP all normal.  Prior to that labs on 10/11/2019 her ALT was 109, AST 51, with normal Bili and ALKP.  She has lost about 25 pounds when I last saw her.  Unfortunately she has not lost any more weight because she has injured her foot and she is in a boot at this point.      Past Medical History:   Diagnosis Date   • Anxiety     Anxiety Disorder, Generalized   • Hypothyroid      Past Surgical History:   Procedure Laterality Date   • WISDOM TOOTH EXTRACTION         Outpatient Medications Marked as Taking for the 1/23/20 encounter (Office Visit) with Kyung Oreilly APRN   Medication Sig Dispense Refill   • levothyroxine (SYNTHROID, LEVOTHROID) 125 MCG tablet Take  by mouth Daily.     • montelukast (SINGULAIR) 10 MG tablet Take 10 mg by mouth Daily.  0   • propranolol (INDERAL) 20 MG tablet Take  by mouth Daily.     • venlafaxine (EFFEXOR) 75 MG tablet Take 75 mg by mouth Daily.         No Known Allergies    Social History     Socioeconomic History   • Marital status: Single     Spouse name: Not on file   • Number of children: Not on file   • Years of education: Not on file   • Highest education level: Not on file   Tobacco Use   • Smoking status: Never Smoker   • Smokeless tobacco: Never Used   Substance and Sexual Activity   • Alcohol use: No   • Drug use: No   • Sexual activity: Defer   Social History Narrative    Child does not have Bleeding or Bruising Disorder    Not Exposed to Second Hand Smoke       "      Family History   Problem Relation Age of Onset   • Heart disease Other    • Heart disease Maternal Grandmother    • Heart disease Maternal Grandfather    • Heart disease Paternal Grandmother    • Heart disease Paternal Grandfather    • Colon polyps Paternal Grandfather    • Colon cancer Neg Hx        Review of Systems    /80   Pulse 80   Ht 177.8 cm (70\")   Wt 111 kg (245 lb)   SpO2 98%   Breastfeeding No   BMI 35.15 kg/m²   Body mass index is 35.15 kg/m².    Physical Exam    ASSESSMENT AND PLAN    Patient's Body mass index is 35.15 kg/m². BMI is above normal parameters. Recommendations include: nutrition counseling.    Macy was seen today for gi problem.    Diagnoses and all orders for this visit:    Fatty liver  Comments:  Liver US 8/19/19: findings consistent with fatty liver    Orders:  -     Comprehensive Metabolic Panel; Future    Elevated LFTs  Comments:  12/18/2019: ALT 75, AST 30, ALKP 48, Bili 0.5  10/11/2019: , AST 51, ALKP 55, Bili 0.7  Orders:  -     Comprehensive Metabolic Panel; Future    Family history of hemochromatosis  Comments:  Hemochromatosis Panel 1/8/2019: Non suggestive of Primary Hemochromatosis  findings of possible carrier with only 1 H63D gene mutation       * Surgery not found *  Patient is educated on the importance of continuance of alcohol avoidance.  I have given her a weight loss goal of 25 pounds over the next 6 months.  We will recheck CMP in 6 months time and she will need to be seen yearly for her fatty liver for surveillance purposes.  If she has any problems or concerns she is to call me.    Kyung Oreilly, APRN  11:42 AM  1/23/2020    Obesity, Adult  Obesity is the condition of having too much total body fat. Being overweight or obese means that your weight is greater than what is considered healthy for your body size. Obesity is determined by a measurement called BMI. BMI is an estimate of body fat and is calculated from height and weight. For " adults, a BMI of 30 or higher is considered obese.  Obesity can eventually lead to other health concerns and major illnesses, including:  · Stroke.  · Coronary artery disease (CAD).  · Type 2 diabetes.  · Some types of cancer, including cancers of the colon, breast, uterus, and gallbladder.  · Osteoarthritis.  · High blood pressure (hypertension).  · High cholesterol.  · Sleep apnea.  · Gallbladder stones.  · Infertility problems.  What are the causes?  The main cause of obesity is taking in (consuming) more calories than your body uses for energy. Other factors that contribute to this condition may include:  · Being born with genes that make you more likely to become obese.  · Having a medical condition that causes obesity. These conditions include:  ¨ Hypothyroidism.  ¨ Polycystic ovarian syndrome (PCOS).  ¨ Binge-eating disorder.  ¨ Cushing syndrome.  · Taking certain medicines, such as steroids, antidepressants, and seizure medicines.  · Not being physically active (sedentary lifestyle).  · Living where there are limited places to exercise safely or buy healthy foods.  · Not getting enough sleep.  What increases the risk?  The following factors may increase your risk of this condition:  · Having a family history of obesity.  · Being a woman of -American descent.  · Being a man of  descent.  What are the signs or symptoms?  Having excessive body fat is the main symptom of this condition.  How is this diagnosed?  This condition may be diagnosed based on:  · Your symptoms.  · Your medical history.  · A physical exam. Your health care provider may measure:  ¨ Your BMI. If you are an adult with a BMI between 25 and less than 30, you are considered overweight. If you are an adult with a BMI of 30 or higher, you are considered obese.  ¨ The distances around your hips and your waist (circumferences). These may be compared to each other to help diagnose your condition.  ¨ Your skinfold thickness. Your  health care provider may gently pinch a fold of your skin and measure it.  How is this treated?  Treatment for this condition often includes changing your lifestyle. Treatment may include some or all of the following:  · Dietary changes. Work with your health care provider and a dietitian to set a weight-loss goal that is healthy and reasonable for you. Dietary changes may include eating:  ¨ Smaller portions. A portion size is the amount of a particular food that is healthy for you to eat at one time. This varies from person to person.  ¨ Low-calorie or low-fat options.  ¨ More whole grains, fruits, and vegetables.  · Regular physical activity. This may include aerobic activity (cardio) and strength training.  · Medicine to help you lose weight. Your health care provider may prescribe medicine if you are unable to lose 1 pound a week after 6 weeks of eating more healthily and doing more physical activity.  · Surgery. Surgical options may include gastric banding and gastric bypass. Surgery may be done if:  ¨ Other treatments have not helped to improve your condition.  ¨ You have a BMI of 40 or higher.  ¨ You have life-threatening health problems related to obesity.  Follow these instructions at home:     Eating and drinking     · Follow recommendations from your health care provider about what you eat and drink. Your health care provider may advise you to:  ¨ Limit fast foods, sweets, and processed snack foods.  ¨ Choose low-fat options, such as low-fat milk instead of whole milk.  ¨ Eat 5 or more servings of fruits or vegetables every day.  ¨ Eat at home more often. This gives you more control over what you eat.  ¨ Choose healthy foods when you eat out.  ¨ Learn what a healthy portion size is.  ¨ Keep low-fat snacks on hand.  ¨ Avoid sugary drinks, such as soda, fruit juice, iced tea sweetened with sugar, and flavored milk.  ¨ Eat a healthy breakfast.  · Drink enough water to keep your urine clear or pale  yellow.  · Do not go without eating for long periods of time (do not fast) or follow a fad diet. Fasting and fad diets can be unhealthy and even dangerous.  Physical Activity   · Exercise regularly, as told by your health care provider. Ask your health care provider what types of exercise are safe for you and how often you should exercise.  · Warm up and stretch before being active.  · Cool down and stretch after being active.  · Rest between periods of activity.  Lifestyle   · Limit the time that you spend in front of your TV, computer, or video game system.  · Find ways to reward yourself that do not involve food.  · Limit alcohol intake to no more than 1 drink a day for nonpregnant women and 2 drinks a day for men. One drink equals 12 oz of beer, 5 oz of wine, or 1½ oz of hard liquor.  General instructions   · Keep a weight loss journal to keep track of the food you eat and how much you exercise you get.  · Take over-the-counter and prescription medicines only as told by your health care provider.  · Take vitamins and supplements only as told by your health care provider.  · Consider joining a support group. Your health care provider may be able to recommend a support group.  · Keep all follow-up visits as told by your health care provider. This is important.  Contact a health care provider if:  · You are unable to meet your weight loss goal after 6 weeks of dietary and lifestyle changes.  This information is not intended to replace advice given to you by your health care provider. Make sure you discuss any questions you have with your health care provider.  Document Released: 01/25/2006 Document Revised: 05/22/2017 Document Reviewed: 10/05/2016  [employer] Interactive Patient Education © 2017 [employer]      IF YOU SMOKE OR USE TOBACCO PLEASE READ THE FOLLOWING:    Why is smoking bad for me?  Smoking increases the risk of heart disease, lung disease, vascular disease, stroke, and cancer.     If you smoke,  STOP!    If you would like more information on quitting smoking, please visit the [employer] website: www.[employer]/corporate/healthier-together/smoke   This link will provide additional resources including the QUIT line and the Beat the Pack support groups.     For more information:    Quit Now [place]  1-800-QUIT-NOW  https://[place].quitlogix.org/en-[place]/

## 2020-03-19 ENCOUNTER — NURSE TRIAGE (OUTPATIENT)
Dept: CALL CENTER | Facility: HOSPITAL | Age: 20
End: 2020-03-19

## 2020-03-20 NOTE — TELEPHONE ENCOUNTER
"Caller asking about her daughter's risk of anant COVID-19 from contact with the wife of a person who went to [facility] Sunday with someone who later tested Pos. For COVID-19. Advice per protocol.     Reason for Disposition  • [1] COVID-19 EXPOSURE within last 14 days AND [2] NO cough, fever, or breathing difficulty AND [3] exposed person is a healthcare worker who was NOT using all recommended personal protective equipment (i.e., a respirator-N95 mask, eye protection, gloves, and gown)    Additional Information  • Negative: Severe difficulty breathing (e.g., struggling for each breath, speak in single words, bluish lips)  • Negative: Sounds like a life-threatening emergency to the triager  • Negative: [1] Difficulty breathing (shortness of breath) occurs AND [2] onset > 14 days after COVID-19 EXPOSURE (Close Contact)  • Negative: [1] Dry cough occurs AND [2] onset > 14 days after COVID-19 EXPOSURE  • Negative: [1] Wet cough (i.e., white-yellow, yellow, green, or prashanth colored sputum) AND [2] onset > 14 days after COVID-19 EXPOSURE  • Negative: [1] Common cold symptoms AND [2] onset > 14 days after COVID-19 EXPOSURE  • Negative: [1] Difficulty breathing occurs AND [2] within 14 days of COVID-19 EXPOSURE  • Negative: Patient sounds very sick or weak to the triager  • Negative: [1] Fever or feeling feverish AND [2] within 14 Days of COVID-19 EXPOSURE  • Negative: [1] Cough occurs AND [2] within 14 days of COVID-19 EXPOSURE  • Negative: [1] Fever (or feeling feverish) OR symptoms of lower respiratory illness (e.g., cough, difficulty breathing) AND [2] TRAVEL FROM [place] (or other CDC identified high risk travel area) within last 14 days    Answer Assessment - Initial Assessment Questions  1. PLACE of CONTACT: \"Where were you when you were exposed to COVID-19  (coronavirus disease 2019)?\" (e.g., city, state, country)      [place]  2. TYPE of CONTACT: \"How much contact was there?\" (e.g., live in same house, work in same " "office, same school)      Visited with the wife of someone who may have had contact with someone who later tested pos.   3. DATE of CONTACT: \"When did you have contact with a coronavirus patient?\" (e.g., days)      Tues.  4. DURATION of CONTACT: \"How long were you in contact with the COVID-19 (coronavirus disease) patient?\" (e.g., a few seconds, passed by person, a few minutes, live with the patient)      na  5. SYMPTOMS: \"Do you have any symptoms?\" (e.g., fever, cough, breathing difficulty)      no  6. PREGNANCY OR POSTPARTUM: \"Is there any chance you are pregnant?\" \"When was your last menstrual period?\" \"Did you deliver in the last 2 weeks?\"      na  7. HIGH RISK: \"Do you have any heart or lung problems? Do you have a weakened immune system?\" (e.g., CHF, COPD, asthma, HIV positive, chemotherapy, renal failure, diabetes mellitus, sickle cell anemia)      no    Protocols used: CORONAVIRUS (COVID-19) EXPOSURE-ADULT-AH      "

## 2020-03-24 ENCOUNTER — TELEPHONE (OUTPATIENT)
Dept: OBGYN | Age: 20
End: 2020-03-24

## 2020-03-24 NOTE — TELEPHONE ENCOUNTER
Pt having several right sided abdominal pain. Must have US to check for cyst. Last November last US . Pt appt alber at 1230.  Pt aware

## 2020-03-25 ENCOUNTER — NURSE TRIAGE (OUTPATIENT)
Dept: CALL CENTER | Facility: HOSPITAL | Age: 20
End: 2020-03-25

## 2020-03-26 ENCOUNTER — NURSE ONLY (OUTPATIENT)
Dept: OBGYN | Age: 20
End: 2020-03-26
Payer: COMMERCIAL

## 2020-03-26 VITALS
SYSTOLIC BLOOD PRESSURE: 140 MMHG | BODY MASS INDEX: 34.5 KG/M2 | WEIGHT: 241 LBS | HEART RATE: 71 BPM | HEIGHT: 70 IN | DIASTOLIC BLOOD PRESSURE: 98 MMHG

## 2020-03-26 PROCEDURE — 96372 THER/PROPH/DIAG INJ SC/IM: CPT | Performed by: ADVANCED PRACTICE MIDWIFE

## 2020-03-26 RX ORDER — KETOROLAC TROMETHAMINE 30 MG/ML
60 INJECTION, SOLUTION INTRAMUSCULAR; INTRAVENOUS ONCE
Qty: 2 ML | Refills: 0
Start: 2020-03-26 | End: 2020-03-26 | Stop reason: CLARIF

## 2020-03-26 RX ORDER — KETOROLAC TROMETHAMINE 30 MG/ML
60 INJECTION, SOLUTION INTRAMUSCULAR; INTRAVENOUS ONCE
Status: COMPLETED | OUTPATIENT
Start: 2020-03-26 | End: 2020-03-26

## 2020-03-26 RX ADMIN — KETOROLAC TROMETHAMINE 60 MG: 30 INJECTION, SOLUTION INTRAMUSCULAR; INTRAVENOUS at 11:52

## 2020-03-26 NOTE — PROGRESS NOTES
After obtaining consent, and per orders of Angelica Gene, injection of Toradol given in Left upper quad. gluteus by Carmie Primus. Patient instructed to remain in clinic for 20 minutes afterwards, and to report any adverse reaction to me immediately.   Marito Linda 46: [id number]  Lot: [id number]  Exp: 01/2022

## 2020-03-26 NOTE — TELEPHONE ENCOUNTER
"    Reason for Disposition  • [1] MILD pain (e.g., does not interfere with normal activities) AND [2] pain comes and goes (cramps) AND [3] present > 48 hours    Additional Information  • Negative: Shock suspected (e.g., cold/pale/clammy skin, too weak to stand, low BP, rapid pulse)  • Negative: Difficult to awaken or acting confused (e.g., disoriented, slurred speech)  • Negative: Passed out (i.e., lost consciousness, collapsed and was not responding)  • Negative: Sounds like a life-threatening emergency to the triager  • Negative: Chest pain  • Negative: Pain is mainly in upper abdomen  (if needed ask: \"is it mainly above the belly button?\")  • Negative: Followed an abdomen (stomach) injury  • Negative: [1] Abdominal pain AND [2] pregnant < 20 weeks  • Negative: [1] Abdominal pain AND [2] pregnant > 20 weeks  • Negative: [1] Abdominal pain AND [2] postpartum (from 0 to 6 weeks after delivery)  • Negative: [1] SEVERE pain (e.g., excruciating) AND [2] present > 1 hour  • Negative: [1] SEVERE pain AND [2] age > 60  • Negative: [1] Vomiting AND [2] contains red blood or black (\"coffee ground\") material  (Exception: few red streaks in vomit that only happened once)  • Negative: Blood in bowel movements   (Exception: blood on surface of BM with constipation)  • Negative: Black or tarry bowel movements  (Exception: chronic-unchanged  black-grey bowel movements AND is taking iron pills or Pepto-bismol)  • Negative: Patient sounds very sick or weak to the triager  • Negative: [1] MILD-MODERATE pain AND [2] constant AND [3] present > 2 hours  • Negative: [1] Vomiting AND [2] abdomen looks much more swollen than usual  • Negative: [1] Vomiting AND [2] contains bile (green color)  • Negative: White of the eyes have turned yellow (i.e., jaundice)  • Negative: Fever > 103 F (39.4 C)  • Negative: [1] Fever > 101 F (38.3 C) AND [2] age > 60  • Negative: [1] Fever > 100.0 F (37.8 C) AND [2] bedridden (e.g., nursing home patient, " "CVA, chronic illness, recovering from surgery)  • Negative: [1] Fever > 100.0 F (37.8 C) AND [2] diabetes mellitus or weak immune system (e.g., HIV positive, cancer chemo, splenectomy, organ transplant, chronic steroids)  • Negative: [1] SEVERE pain AND [2] present < 1 hour  • Negative: [1] MODERATE pain (e.g., interferes with normal activities) AND [2] pain comes and goes (cramps) AND [3] present > 24 hours  (Exception: pain with Vomiting or Diarrhea - see that Guideline)    Answer Assessment - Initial Assessment Questions  1. LOCATION: \"Where does it hurt?\"       Abdominal pain right side in groin area, and sometimes in back also.   2. RADIATION: \"Does the pain shoot anywhere else?\" (e.g., chest, back)      It is just on right side in groin, hip.   3. ONSET: \"When did the pain begin?\" (e.g., minutes, hours or days ago)       Started on Thursday of last week.    4. SUDDEN: \"Gradual or sudden onset?\"      She does not know.  First felt like a pinch but comes and goes.   5. PATTERN \"Does the pain come and go, or is it constant?\"     - If constant: \"Is it getting better, staying the same, or worsening?\"       (Note: Constant means the pain never goes away completely; most serious pain is constant and it progresses)      - If intermittent: \"How long does it last?\" \"Do you have pain now?\"      (Note: Intermittent means the pain goes away completely between bouts)      Comes and goes.    6. SEVERITY: \"How bad is the pain?\"  (e.g., Scale 1-10; mild, moderate, or severe)    - MILD (1-3): doesn't interfere with normal activities, abdomen soft and not tender to touch     - MODERATE (4-7): interferes with normal activities or awakens from sleep, tender to touch     - SEVERE (8-10): excruciating pain, doubled over, unable to do any normal activities       Mild, hasn't stopped her but just uncomfortable.    7. RECURRENT SYMPTOM: \"Have you ever had this type of abdominal pain before?\" If so, ask: \"When was the last time?\" and " "\"What happened that time?\"       Had an ovarian cyst with similar pain.    8. CAUSE: \"What do you think is causing the abdominal pain?\"      Unknown   9. RELIEVING/AGGRAVATING FACTORS: \"What makes it better or worse?\" (e.g., movement, antacids, bowel movement)      A bowel movement sometimes makes it better but not always.  Bowel movement does not take pain away.   10. OTHER SYMPTOMS: \"Has there been any vomiting, diarrhea, constipation, or urine problems?\"        Loose stools but not diarrhea.    11. PREGNANCY: \"Is there any chance you are pregnant?\" \"When was your last menstrual period?\"        Last period was last week.  Not pregnant.    Protocols used: ABDOMINAL PAIN - FEMALE-ADULT-AH      "

## 2020-03-26 NOTE — PATIENT INSTRUCTIONS
Patient Education        ketorolac (oral/injection)  Pronunciation:  TARA toe ROLE ak  Brand: Toradol  What is the most important information I should know about ketorolac? You should not use ketorolac if you have any active or recent bleeding (including bleeding inside your body), a head injury, a stomach ulcer, severe kidney disease, a bleeding or blood-clotting disorder, a history of severe allergic reaction to aspirin or an NSAID, if you are scheduled to have surgery, if you are in late pregnancy, or if you are breast-feeding a baby. You should not use ketorolac if you also take pentoxifylline, probenecid, aspirin, or other NSAIDs. Ketorolac can increase your risk of fatal heart attack or stroke, especially if you use it long term or take high doses, or if you have heart disease. Do not use this medicine just before or after heart bypass surgery (coronary artery bypass graft, or CABG). Ketorolac may also cause stomach or intestinal bleeding, which can be fatal. These conditions can occur without warning while you are using ketorolac, especially in older adults. You should not take this medicine if you already have bleeding in your stomach or intestines. What is ketorolac? Ketorolac is a nonsteroidal anti-inflammatory drug (NSAID). Ketorolac works by reducing hormones that cause inflammation and pain in the body. Ketorolac is used short-term (5 days or less) to treat moderate to severe pain. Ketorolac may also be used for purposes not listed in this medication guide. What should I discuss with my healthcare provider before taking ketorolac? Ketorolac can increase your risk of fatal heart attack or stroke, especially if you use it long term or take high doses, or if you have heart disease. Even people without heart disease or risk factors could have a stroke or heart attack while taking this medicine.   Do not use this medicine just before or after heart bypass surgery (coronary artery bypass graft, or CABG). Ketorolac may also cause stomach or intestinal bleeding, which can be fatal. These conditions can occur without warning while you are using ketorolac, especially in older adults. You should not use ketorolac if you are allergic to it, or if you have:  · active or recent stomach ulcer, stomach bleeding, or intestinal bleeding;  · a bleeding or blood-clotting disorder;  · a closed head injury or bleeding in your brain;  · bleeding from a recent surgery;  · severe kidney disease or dehydration;  · a history of asthma or severe allergic reaction after taking aspirin or an NSAID;  · if you are scheduled to have surgery (especially bypass surgery); or  · if you are in late pregnancy or you are breast-feeding a baby. Some medicines can cause unwanted or dangerous effects when used with ketorolac. Your doctor may need to change your treatment plan if you use any of the following drugs:  · pentoxifylline;  · probenecid; or  · aspirin or other NSAIDs --ibuprofen (Advil, Motrin), naproxen (Aleve), celecoxib, diclofenac, indomethacin, meloxicam, and others. To make sure ketorolac is safe for you, tell your doctor if you have ever had:  · heart disease, high blood pressure, high cholesterol, diabetes, or if you smoke;  · a heart attack, stroke, or blood clot;  · stomach ulcers or bleeding;  · inflammatory bowel disease, ulcerative colitis, or Crohn's disease;  · liver disease;  · kidney disease (or if you are on dialysis);  · asthma; or  · fluid retention. Using ketorolac during the last 3 months of pregnancy may harm the unborn baby. Ketorolac may also increase the risk of uterine bleeding and is not for use during labor and delivery. Tell your doctor if you are pregnant. Ketorolac can pass into breast milk and may harm a nursing baby. Do not breast-feed while using this medicine. Ketorolac is not approved for use by anyone younger than 3years old. How should I take ketorolac?   Ketorolac is usually given first as an injection, and then as an oral (by mouth) medicine. The injection is given into a muscle, or into a vein through an IV. A healthcare provider will give you the injection. Follow all directions on your prescription label. Do not take this medicine in larger amounts or for longer than recommended. Use the lowest dose that is effective in treating your condition. Ketorolac should not be used for longer than 5 days, including both injection plus tablets. Long-term use of this medicine can damage your kidneys or cause bleeding. Store at room temperature away from moisture, heat, and light. Keep the bottle tightly closed when not in use. Read all patient information, medication guides, and instruction sheets provided to you. Ask your doctor or pharmacist if you have any questions. What happens if I miss a dose? Since ketorolac is used for pain, you are not likely to miss a dose. Skip any missed dose if it is almost time for your next scheduled dose. Do not use extra medicine to make up the missed dose. What happens if I overdose? Seek emergency medical attention or call the Poison Help line at 1-554.746.8271. What should I avoid while taking ketorolac? Avoid drinking alcohol. It may increase your risk of stomach bleeding. Ask a doctor or pharmacist before using any cold, allergy, or pain medication. Many medicines available over the counter contain aspirin or other medicines similar to ketorolac. Taking certain products together can cause you to get too much of this type of medication. Check the label to see if a medicine contains aspirin, ibuprofen, ketoprofen, or naproxen. What are the possible side effects of ketorolac?   Get emergency medical help if you have signs of an allergic reaction (hives, difficult breathing, swelling in your face or throat) or a severe skin reaction (fever, sore throat, burning in your eyes, skin pain, red or purple skin rash that spreads and causes blistering and peeling). Get emergency medical help if you have signs of a heart attack or stroke: chest pain spreading to your jaw or shoulder, sudden numbness or weakness on one side of the body, slurred speech, feeling short of breath. Stop using ketorolac and call your doctor at once if you have:  · shortness of breath (even with mild exertion); · swelling or rapid weight gain;  · the first sign of any skin rash, no matter how mild;  · signs of stomach bleeding --bloody or tarry stools, coughing up blood or vomit that looks like coffee grounds;  · liver problems --nausea, upper stomach pain, itching, tired feeling, flu-like symptoms, loss of appetite, dark urine, josefina-colored stools, jaundice (yellowing of the skin or eyes);  · low red blood cells (anemia) --pale skin, feeling light-headed or short of breath, rapid heart rate, trouble concentrating; or  · kidney problems --little or no urinating, painful or difficult urination, swelling in your feet or ankles, feeling tired or short of breath. Common side effects may include:  · nausea, stomach pain, indigestion, diarrhea;  · dizziness, drowsiness;  · headache; or  · swelling. This is not a complete list of side effects and others may occur. Call your doctor for medical advice about side effects. You may report side effects to FDA at 2-992-FDA-2601. What other drugs will affect ketorolac? Ask your doctor before using ketorolac if you take an antidepressant such as citalopram, escitalopram, fluoxetine (Prozac), fluvoxamine, paroxetine, sertraline (Zoloft), trazodone, or vilazodone. Taking any of these medicines with an NSAID may cause you to bruise or bleed easily.   Tell your doctor about all your current medicines and any you start or stop using, especially:  · lithium;  · methotrexate;  · heparin or warfarin (Coumadin, Fredirick Nap);  · antipsychotic medicine;  · heart or blood pressure medication, including a diuretic or \"water pill\";  · seizure medicine (carbamazepine, phenytoin); or  · steroid medicine (such as prednisone). This list is not complete. Other drugs may interact with ketorolac, including prescription and over-the-counter medicines, vitamins, and herbal products. Not all possible interactions are listed in this medication guide. Where can I get more information? Your pharmacist can provide more information about ketorolac. Remember, keep this and all other medicines out of the reach of children, never share your medicines with others, and use this medication only for the indication prescribed. Every effort has been made to ensure that the information provided by [facility] [address] is accurate, up-to-date, and complete, but no guarantee is made to that effect. Drug information contained herein may be time sensitive. [facility] information has been compiled for use by healthcare practitioners and consumers in the [place] and therefore [facility] does not warrant that uses outside of the [place] are appropriate, unless specifically indicated otherwise. [facility]'s drug information does not endorse drugs, diagnose patients or recommend therapy. [facility] information is an informational resource designed to assist licensed healthcare practitioners in caring for their patients and/or to serve consumers viewing this service as a supplement to, and not a substitute for, the expertise, skill, knowledge and judgment of healthcare practitioners. The absence of a warning for a given drug or drug combination in no way should be construed to indicate that the drug or drug combination is safe, effective or appropriate for any given patient. [facility] does not assume any responsibility for any aspect of healthcare administered with the aid of information [facility] provides. The information contained herein is not intended to cover all possible uses, directions, precautions, warnings, drug interactions, allergic reactions, or adverse effects.  If you have questions about the drugs you are taking, check with your doctor, nurse or pharmacist.  Copyright 9453-7689 [address]: 9.03. Revision date: 11/20/2017. Care instructions adapted under license by [facility] [address] If you have questions about a medical condition or this instruction, always ask your healthcare professional. [place] [address] any warranty or liability for your use of this information.

## 2020-03-27 ENCOUNTER — HOSPITAL ENCOUNTER (OUTPATIENT)
Dept: ULTRASOUND IMAGING | Age: 20
Discharge: HOME | End: 2020-03-27
Payer: COMMERCIAL

## 2020-03-27 ENCOUNTER — NURSE TRIAGE (OUTPATIENT)
Dept: CALL CENTER | Facility: HOSPITAL | Age: 20
End: 2020-03-27

## 2020-03-27 PROCEDURE — 76856 US EXAM PELVIC COMPLETE: CPT

## 2020-03-28 NOTE — TELEPHONE ENCOUNTER
"    Reason for Disposition  • Health Information question, no triage required and triager able to answer question    Additional Information  • Negative: [1] Caller is not with the adult (patient) AND [2] reporting urgent symptoms  • Negative: Lab result questions  • Negative: Medication questions  • Negative: Caller can't be reached by phone  • Negative: Caller has already spoken to PCP or another triager  • Negative: RN needs further essential information from caller in order to complete triage  • Negative: Requesting regular office appointment  • Negative: [1] Caller requesting NON-URGENT health information AND [2] PCP's office is the best resource    Answer Assessment - Initial Assessment Questions  1. REASON FOR CALL or QUESTION: \"What is your reason for calling today?\" or \"How can I best help you?\" or \"What question do you have that I can help answer?\"      She was seen by PCP yesterday for abdominal pain and they thought it was cyst related. She had an ultrasound done with OBGYN today and have not heard results. They are wanting to know if she can get pain medication. Explained that this could not be called in and that she would need to be seen in ER for this. She was also told to contact OBGYN for further.    Protocols used: INFORMATION ONLY CALL-ADULT-      "

## 2020-04-04 ENCOUNTER — NURSE TRIAGE (OUTPATIENT)
Dept: CALL CENTER | Facility: HOSPITAL | Age: 20
End: 2020-04-04

## 2020-04-05 ENCOUNTER — HOSPITAL ENCOUNTER (EMERGENCY)
Facility: HOSPITAL | Age: 20
Discharge: HOME | End: 2020-04-05
Admitting: INTERNAL MEDICINE

## 2020-04-05 ENCOUNTER — APPOINTMENT (OUTPATIENT)
Dept: CT IMAGING | Facility: HOSPITAL | Age: 20
End: 2020-04-05

## 2020-04-05 ENCOUNTER — NURSE TRIAGE (OUTPATIENT)
Dept: CALL CENTER | Facility: HOSPITAL | Age: 20
End: 2020-04-05

## 2020-04-05 VITALS
SYSTOLIC BLOOD PRESSURE: 136 MMHG | DIASTOLIC BLOOD PRESSURE: 83 MMHG | RESPIRATION RATE: 18 BRPM | OXYGEN SATURATION: 99 % | HEIGHT: 70 IN | TEMPERATURE: 98.8 F | HEART RATE: 74 BPM | WEIGHT: 244 LBS | BODY MASS INDEX: 34.93 KG/M2

## 2020-04-05 DIAGNOSIS — K62.5 RECTAL BLEEDING: Primary | ICD-10-CM

## 2020-04-05 DIAGNOSIS — N83.201 RIGHT OVARIAN CYST: ICD-10-CM

## 2020-04-05 LAB
ALBUMIN SERPL-MCNC: 4.4 G/DL (ref 3.5–5.2)
ALBUMIN/GLOB SERPL: 1.6 G/DL
ALP SERPL-CCNC: 50 U/L (ref 39–117)
ALT SERPL W P-5'-P-CCNC: 69 U/L (ref 1–33)
ANION GAP SERPL CALCULATED.3IONS-SCNC: 14 MMOL/L (ref 5–15)
AST SERPL-CCNC: 28 U/L (ref 1–32)
B-HCG UR QL: NEGATIVE
BACTERIA UR QL AUTO: ABNORMAL /HPF
BASOPHILS # BLD AUTO: 0.09 10*3/MM3 (ref 0–0.2)
BASOPHILS NFR BLD AUTO: 0.9 % (ref 0–1.5)
BILIRUB SERPL-MCNC: 0.3 MG/DL (ref 0.2–1.2)
BILIRUB UR QL STRIP: NEGATIVE
BUN BLD-MCNC: 9 MG/DL (ref 6–20)
BUN/CREAT SERPL: 18.8 (ref 7–25)
CALCIUM SPEC-SCNC: 9.4 MG/DL (ref 8.6–10.5)
CHLORIDE SERPL-SCNC: 106 MMOL/L (ref 98–107)
CLARITY UR: CLEAR
CO2 SERPL-SCNC: 19 MMOL/L (ref 22–29)
COLOR UR: YELLOW
CREAT BLD-MCNC: 0.48 MG/DL (ref 0.57–1)
DEPRECATED RDW RBC AUTO: 38.3 FL (ref 37–54)
DEVELOPER EXPIRATION DATE: ABNORMAL
DEVELOPER LOT NUMBER: 165
EOSINOPHIL # BLD AUTO: 0.21 10*3/MM3 (ref 0–0.4)
EOSINOPHIL NFR BLD AUTO: 2.1 % (ref 0.3–6.2)
ERYTHROCYTE [DISTWIDTH] IN BLOOD BY AUTOMATED COUNT: 11.9 % (ref 12.3–15.4)
EXPIRATION DATE: ABNORMAL
FECAL OCCULT BLOOD SCREEN, POC: POSITIVE
GFR SERPL CREATININE-BSD FRML MDRD: >150 ML/MIN/1.73
GLOBULIN UR ELPH-MCNC: 2.8 GM/DL
GLUCOSE BLD-MCNC: 110 MG/DL (ref 65–99)
GLUCOSE UR STRIP-MCNC: NEGATIVE MG/DL
HCT VFR BLD AUTO: 39.3 % (ref 34–46.6)
HGB BLD-MCNC: 14 G/DL (ref 12–15.9)
HGB UR QL STRIP.AUTO: NEGATIVE
HYALINE CASTS UR QL AUTO: ABNORMAL /LPF
IMM GRANULOCYTES # BLD AUTO: 0.02 10*3/MM3 (ref 0–0.05)
IMM GRANULOCYTES NFR BLD AUTO: 0.2 % (ref 0–0.5)
INTERNAL NEGATIVE CONTROL: NEGATIVE
INTERNAL POSITIVE CONTROL: POSITIVE
KETONES UR QL STRIP: NEGATIVE
LEUKOCYTE ESTERASE UR QL STRIP.AUTO: ABNORMAL
LYMPHOCYTES # BLD AUTO: 2.73 10*3/MM3 (ref 0.7–3.1)
LYMPHOCYTES NFR BLD AUTO: 27.7 % (ref 19.6–45.3)
Lab: 165
Lab: NORMAL
MCH RBC QN AUTO: 31.3 PG (ref 26.6–33)
MCHC RBC AUTO-ENTMCNC: 35.6 G/DL (ref 31.5–35.7)
MCV RBC AUTO: 87.9 FL (ref 79–97)
MONOCYTES # BLD AUTO: 0.93 10*3/MM3 (ref 0.1–0.9)
MONOCYTES NFR BLD AUTO: 9.5 % (ref 5–12)
NEGATIVE CONTROL: NEGATIVE
NEUTROPHILS # BLD AUTO: 5.86 10*3/MM3 (ref 1.7–7)
NEUTROPHILS NFR BLD AUTO: 59.6 % (ref 42.7–76)
NITRITE UR QL STRIP: NEGATIVE
NRBC BLD AUTO-RTO: 0 /100 WBC (ref 0–0.2)
PH UR STRIP.AUTO: 6 [PH] (ref 5–8)
PLATELET # BLD AUTO: 246 10*3/MM3 (ref 140–450)
PMV BLD AUTO: 11.7 FL (ref 6–12)
POSITIVE CONTROL: POSITIVE
POTASSIUM BLD-SCNC: 4.4 MMOL/L (ref 3.5–5.2)
PROT SERPL-MCNC: 7.2 G/DL (ref 6–8.5)
PROT UR QL STRIP: NEGATIVE
RBC # BLD AUTO: 4.47 10*6/MM3 (ref 3.77–5.28)
RBC # UR: ABNORMAL /HPF
REF LAB TEST METHOD: ABNORMAL
SODIUM BLD-SCNC: 139 MMOL/L (ref 136–145)
SP GR UR STRIP: 1.02 (ref 1–1.03)
SQUAMOUS #/AREA URNS HPF: ABNORMAL /HPF
UROBILINOGEN UR QL STRIP: ABNORMAL
WBC NRBC COR # BLD: 9.84 10*3/MM3 (ref 3.4–10.8)
WBC UR QL AUTO: ABNORMAL /HPF

## 2020-04-05 PROCEDURE — 82270 OCCULT BLOOD FECES: CPT | Performed by: PHYSICIAN ASSISTANT

## 2020-04-05 PROCEDURE — 74177 CT ABD & PELVIS W/CONTRAST: CPT

## 2020-04-05 PROCEDURE — 81001 URINALYSIS AUTO W/SCOPE: CPT | Performed by: PHYSICIAN ASSISTANT

## 2020-04-05 PROCEDURE — 80053 COMPREHEN METABOLIC PANEL: CPT | Performed by: PHYSICIAN ASSISTANT

## 2020-04-05 PROCEDURE — 99284 EMERGENCY DEPT VISIT MOD MDM: CPT

## 2020-04-05 PROCEDURE — 25010000002 IOPAMIDOL 61 % SOLUTION: Performed by: PHYSICIAN ASSISTANT

## 2020-04-05 PROCEDURE — 81025 URINE PREGNANCY TEST: CPT | Performed by: PHYSICIAN ASSISTANT

## 2020-04-05 PROCEDURE — 85025 COMPLETE CBC W/AUTO DIFF WBC: CPT | Performed by: PHYSICIAN ASSISTANT

## 2020-04-05 RX ORDER — SODIUM CHLORIDE 0.9 % (FLUSH) 0.9 %
10 SYRINGE (ML) INJECTION AS NEEDED
Status: DISCONTINUED | OUTPATIENT
Start: 2020-04-05 | End: 2020-04-05 | Stop reason: HOSPADM

## 2020-04-05 RX ORDER — HYDROXYZINE HYDROCHLORIDE 25 MG/1
50 TABLET, FILM COATED ORAL ONCE
Status: COMPLETED | OUTPATIENT
Start: 2020-04-05 | End: 2020-04-05

## 2020-04-05 RX ORDER — FLUOXETINE HYDROCHLORIDE 20 MG/1
20 CAPSULE ORAL DAILY
COMMUNITY

## 2020-04-05 RX ORDER — HYDROXYZINE HYDROCHLORIDE 25 MG/1
25 TABLET, FILM COATED ORAL 3 TIMES DAILY PRN
Qty: 10 TABLET | Refills: 0 | Status: SHIPPED | OUTPATIENT
Start: 2020-04-05 | End: 2020-11-20

## 2020-04-05 RX ORDER — DESLORATADINE 5 MG/1
5 TABLET ORAL DAILY
COMMUNITY

## 2020-04-05 RX ORDER — NORETHINDRONE ACETATE AND ETHINYL ESTRADIOL 1; .02 MG/1; MG/1
1 TABLET ORAL DAILY
COMMUNITY

## 2020-04-05 RX ADMIN — IOPAMIDOL 100 ML: 612 INJECTION, SOLUTION INTRAVENOUS at 02:51

## 2020-04-05 RX ADMIN — HYDROXYZINE HYDROCHLORIDE 50 MG: 25 TABLET, FILM COATED ORAL at 02:20

## 2020-04-05 RX ADMIN — SODIUM CHLORIDE 1000 ML: 9 INJECTION, SOLUTION INTRAVENOUS at 02:18

## 2020-04-05 NOTE — TELEPHONE ENCOUNTER
"    Reason for Disposition  • Health Information question, no triage required and triager able to answer question    Additional Information  • Negative: [1] Caller is not with the adult (patient) AND [2] reporting urgent symptoms  • Negative: Lab result questions  • Negative: Medication questions  • Negative: Caller can't be reached by phone  • Negative: Caller has already spoken to PCP or another triager  • Negative: RN needs further essential information from caller in order to complete triage  • Negative: Requesting regular office appointment  • Negative: [1] Caller requesting NON-URGENT health information AND [2] PCP's office is the best resource    Answer Assessment - Initial Assessment Questions  1. REASON FOR CALL or QUESTION: \"What is your reason for calling today?\" or \"How can I best help you?\" or \"What question do you have that I can help answer?\"      Mom just wanted to review information from earlier call. This was reviewed with her.    Protocols used: INFORMATION ONLY CALL-ADULT-      "

## 2020-04-05 NOTE — TELEPHONE ENCOUNTER
"Mother states this morning she had a BM and there was some blood in her stool when she wiped. States she had her lay off the cheese and drink a bunch of water. States she went a few minutes ago and the stool looked like \"stones\" \"really dark green or black\". States approximately golf ball sized. Denies constipation.     Reason for Disposition  • MILD rectal bleeding (more than just a few drops or streaks)    Additional Information  • Negative: Shock suspected (e.g., cold/pale/clammy skin, too weak to stand, low BP, rapid pulse)  • Negative: Difficult to awaken or acting confused (e.g., disoriented, slurred speech)  • Negative: Passed out (i.e., lost consciousness, collapsed and was not responding)  • Negative: [1] Vomiting AND [2] contains red blood or black (\"coffee ground\") material  (Exception: few red streaks in vomit that only happened once)  • Negative: Sounds like a life-threatening emergency to the triager  • Negative: Diarrhea is main symptom  • Negative: Stool color other than brown or tan is main concern  (no bleeding and no melena)  • Negative: SEVERE rectal bleeding (large blood clots; on and off, or constant bleeding)  • Negative: SEVERE dizziness (e.g., unable to stand, requires support to walk, feels like passing out now)  • Negative: [1] MODERATE rectal bleeding (small blood clots, passing blood without stool, or toilet water turns red) AND [2] more than once a day  • Negative: Pale skin (pallor) of new onset or worsening  • Negative: Tarry or jet black-colored stool (not dark green)  • Negative: [1] Constant abdominal pain AND [2] present > 2 hours  • Negative: Rectal foreign body (i.e., now or within past week;  inserted or swallowed)  • Negative: High-risk adult (e.g., prior surgery on aorta, abdominal aortic aneurysm)  • Negative: Taking Coumadin (warfarin) or other strong blood thinner, or known bleeding disorder (e.g., thrombocytopenia)  • Negative: Known cirrhosis of the liver (or history of " "liver failure or ascites)  • Negative: [1] Colonoscopy AND [2] in past 72 hours  • Negative: Patient sounds very sick or weak to the triager  • Negative: MODERATE rectal bleeding (small blood clots, passing blood without stool, or toilet water turns red)  • Negative: Cancer of rectum or intestines (colon)  • Negative: Radiation therapy to lower abdomen or pelvis    Answer Assessment - Initial Assessment Questions  1. APPEARANCE of BLOOD: \"What color is it?\" \"Is it passed separately, on the surface of the stool, or mixed in with the stool?\"       See note  2. AMOUNT: \"How much blood was passed?\"       \"it was really red\"  3. FREQUENCY: \"How many times has blood been passed with the stools?\"       once  4. ONSET: \"When was the blood first seen in the stools?\" (Days or weeks)       yesterday  5. DIARRHEA: \"Is there also some diarrhea?\" If so, ask: \"How many diarrhea stools were passed in past 24 hours?\"       See note  6. CONSTIPATION: \"Do you have constipation?\" If so, \"How bad is it?\"      See note  7. RECURRENT SYMPTOMS: \"Have you had blood in your stools before?\" If so, ask: \"When was the last time?\" and \"What happened that time?\"       no  8. BLOOD THINNERS: \"Do you take any blood thinners?\" (e.g., Coumadin/warfarin, Pradaxa/dabigatran, aspirin)      no  9. OTHER SYMPTOMS: \"Do you have any other symptoms?\"  (e.g., abdominal pain, vomiting, dizziness, fever)      no  10. PREGNANCY: \"Is there any chance you are pregnant?\" \"When was your last menstrual period?\"        n/a    Protocols used: RECTAL BLEEDING-ADULT-AH      "

## 2020-04-05 NOTE — ED PROVIDER NOTES
Subjective   History of Present Illness    Patient is a 19-year-old female presenting to ED with rectal bleeding.  Patient stated she was recently been being treated for a right-sided ovarian cyst which was measured at largest to be 7 cm.  Patient reported during this time.  They gave her opioid pain medications for which she suffered constipation.  Patient reported prior to this she usually had regular bowel movements.  Patient stated that she has not taken those medications in a couple days and earlier this morning had her first normal bowel movement.  Patient reported that after having her bowel movement her abdominal pains and cramping is resolved.  Patient reported that this evening she went to have another bowel movement during which she strained a little and noticed that there was blood in her stool.  Patient reported at first she noticed blood when she went to wipe on the toilet paper, some in the toilet bowl, and then some on the outer part of the stool.  Patient stated at this time she contacted the nurse advice line who advised that if she continued to have these episodes she be seen for further evaluation.  Patient reported she went to sleep around 11 PM and just prior to arrival woke up and had another similar episode.  Patient denies any nausea, vomiting, diarrhea, fevers, chills, diaphoresis, or any urinary symptoms including dysuria, hematuria, or flank pain.  Patient denies use of any blood thinners.  Patient denies bleeding of any other sources including easy gum bleeding, epistaxis, tarry stools, or easy bruising.  Patient denies ever having any known GI diagnoses or complications.  Patient noted that with her ovarian cyst pain she was taking more ibuprofen than she normally does however she was only taking this up to 2 times a day at the recommended dose.  Patient denies any other frequent use of Tylenol, BC powder, or aspirin. Patient denies any known history of hemorrhoids.    Patient denies  use of cigarettes or tobacco products, marijuana, or any IV/recreational/illicit drugs.  Patient did note that she will occasionally socially drink with friends.  Patient stated her last drink was months ago.  Patient reports a history of hypothyroidism for which she takes Synthroid and anxiety for which she takes effexor and prozac and denies any other significant medical history.  Patient denies any previous surgical history including abdomen/pelvis procedures.    Records reviewed show patient was seen in November 2019 for right ovarian cyst, as well as an appointment in December where patient was seen for a family history of hemochromatosis.  Patient was then seen on 3/27/2024 right ovarian cyst.  Patient's last abdominal imaging was a KUB performed on 8/19/2019 which showed: No acute abnormalities.    Review of Systems   Constitutional: Negative.  Negative for chills, diaphoresis and fever.   HENT: Negative.  Negative for congestion, sinus pressure and sore throat.    Respiratory: Negative.  Negative for cough, chest tightness and shortness of breath.    Cardiovascular: Negative.  Negative for chest pain.   Gastrointestinal: Positive for blood in stool and constipation. Negative for abdominal pain, anal bleeding, nausea and vomiting.   Genitourinary: Negative.  Negative for dysuria and hematuria.   Musculoskeletal: Negative.  Negative for arthralgias and myalgias.   Skin: Negative.  Negative for rash and wound.   Neurological: Negative.  Negative for dizziness, syncope, weakness and headaches.   Hematological: Does not bruise/bleed easily.   Psychiatric/Behavioral: Negative.    All other systems reviewed and are negative.      Past Medical History:   Diagnosis Date   • Anxiety     Anxiety Disorder, Generalized   • Hypothyroid        No Known Allergies    Past Surgical History:   Procedure Laterality Date   • WISDOM TOOTH EXTRACTION         Family History   Problem Relation Age of Onset   • Heart disease Other     • Heart disease Maternal Grandmother    • Heart disease Maternal Grandfather    • Heart disease Paternal Grandmother    • Heart disease Paternal Grandfather    • Colon polyps Paternal Grandfather    • Colon cancer Neg Hx        Social History     Socioeconomic History   • Marital status: Single     Spouse name: Not on file   • Number of children: Not on file   • Years of education: Not on file   • Highest education level: Not on file   Tobacco Use   • Smoking status: Never Smoker   • Smokeless tobacco: Never Used   Substance and Sexual Activity   • Alcohol use: No   • Drug use: No   • Sexual activity: Defer   Social History Narrative    Child does not have Bleeding or Bruising Disorder    Not Exposed to Second Hand Smoke           Objective   Physical Exam   Constitutional: She is oriented to person, place, and time. She appears well-developed and well-nourished. She is cooperative. No distress.   HENT:   Head: Normocephalic and atraumatic.   Right Ear: External ear normal.   Left Ear: External ear normal.   Mouth/Throat: Uvula is midline, oropharynx is clear and moist and mucous membranes are normal.   Eyes: Pupils are equal, round, and reactive to light. Conjunctivae, EOM and lids are normal. Right conjunctiva is not injected. Left conjunctiva is not injected.   Neck: Normal range of motion. Neck supple.   Cardiovascular: Normal rate, regular rhythm, normal heart sounds, intact distal pulses and normal pulses.   No murmur heard.  Pulses:       Radial pulses are 2+ on the right side, and 2+ on the left side.        Dorsalis pedis pulses are 2+ on the right side, and 2+ on the left side.        Posterior tibial pulses are 2+ on the right side, and 2+ on the left side.   Pulmonary/Chest: Effort normal and breath sounds normal. No respiratory distress. She has no decreased breath sounds. She has no wheezes. She has no rhonchi. She has no rales. She exhibits no bony tenderness.   Abdominal: Soft. Normal appearance  and bowel sounds are normal. There is no tenderness. There is no guarding and no CVA tenderness.   Genitourinary: Rectal exam shows internal hemorrhoid and guaiac positive stool. Rectal exam shows no external hemorrhoid, no fissure, no tenderness and anal tone normal. Pelvic exam was performed with patient in the knee-chest position.   Genitourinary Comments: Chaperone present for entire examination, Neris LANIER   Musculoskeletal:        Cervical back: Normal.        Thoracic back: Normal.        Lumbar back: Normal.   Lymphadenopathy:     She has no cervical adenopathy.   Neurological: She is alert and oriented to person, place, and time. She has normal strength. Gait normal.   Skin: Skin is warm, dry and intact. Capillary refill takes less than 2 seconds. No rash noted. She is not diaphoretic.   Psychiatric: She has a normal mood and affect. Her speech is normal and behavior is normal. Thought content normal.   Nursing note and vitals reviewed.      Procedures           ED Course  ED Course as of Apr 10 1222   Sun Apr 05, 2020   0201 Rectal examination performed at this time.  Chaperone present for entire examination, Lidia LANIER.    [JS]   0242 Urinalysis with 3-5 RBC, 0-2 WBC, no bacteria, 0-2 squamous epithelium, small leukocytes, nitrate negative.Bicarb slightly decreased at 19.  No anion gap.  No leukocytosis/leukopenia.  Hemoglobin and hematocrit stable at 14/39.3.CT pending transport.    [JS]   0312 Reviewed with Dr. Stokes CT findings.  Dr. Stokes in agreement with continued outpatient follow-up for patient's previously known cyst.    [JS]   0318 Upon reevaluation patient resting comfortably in bed.  Reviewed lab and imaging findings.  Discussed continued need for outpatient follow-up for known right-sided ovarian cyst.  Described today that patient's cyst is 7 x 5.5 x 4.5 cm.  Reviewed with patient importance of treatment for hemorrhoid at this time including Anusol and further monitoring of stools.  Discussed  with patient strict return precautions and need for immediate return to ED should she develop any new or worsening symptoms.  Discussed need for PCP follow-up in the next 24 to 48 hours to assure improvement if not resolution of symptoms.  Patient without any further questions, concerns, or needs at this time is stable for discharge.    [JS]      ED Course User Index  [JS] Spenser Manzanares PA-C                                 EMR Dragon/transcription disclaimer: Much of this encounter note was created utilizing an electronic transcription/translation of spoken language to printed text.  Electronic translation of spoken language may permit erroneous, or at times, nonsensical words or phrases to be in advertently transcribed; although I have reviewed the note for such errors to the best of my ability, some may still exist.            MDM  Number of Diagnoses or Management Options  Rectal bleeding:   Right ovarian cyst:      Amount and/or Complexity of Data Reviewed  Clinical lab tests: ordered and reviewed  Tests in the radiology section of CPT®: ordered and reviewed  Decide to obtain previous medical records or to obtain history from someone other than the patient: yes  Review and summarize past medical records: yes  Discuss the patient with other providers: yes    Patient Progress  Patient progress: improved      Final diagnoses:   Rectal bleeding   Right ovarian cyst            Spenser Manzanares PA-C  04/10/20 1222

## 2020-04-05 NOTE — DISCHARGE INSTRUCTIONS
Rectal Bleeding    Rectal bleeding is when blood passes out of the anus. People with rectal bleeding may notice bright red blood in their underwear or in the toilet after having a bowel movement. They may also have dark red or black stools.  Rectal bleeding is usually a sign that something is wrong. Many things can cause rectal bleeding, including:  · Hemorrhoids. These are blood vessels in the anus or rectum that are larger than normal.  · Fistulas. These are abnormal passages in the rectum and anus.  · Anal fissures. This is a tear in the anus.  · Diverticulosis. This is a condition in which pockets or sacs project from the bowel.  · Proctitis and colitis. These are conditions in which the rectum, colon, or anus become inflamed.  · Polyps. These are growths that can be cancerous (malignant) or non-cancerous (benign).  · Part of the rectum sticking out from the anus (rectal prolapse).  · Certain medicines.  · Intestinal infections.  Follow these instructions at home:  Pay attention to any changes in your symptoms. Take these actions to help lessen bleeding and discomfort:  · Eat a diet that is high in fiber. This will keep your stool soft, making it easier to pass stools without straining. Ask your health care provider what foods and drinks are high in fiber.  · Drink enough fluid to keep your urine clear or pale yellow. This also helps to keep your stool soft.  · Try taking a warm bath. This may help soothe any pain in your rectum.  · Keep all follow-up visits as told by your health care provider. This is important.  Get help right away if:  · You have new or increased rectal bleeding.  · You have black or dark red stools.  · You vomit blood or something that looks like coffee grounds.  · You have pain or tenderness in your abdomen.  · You have a fever.  · You feel weak.  · You feel nauseous.  · You faint.  · You have severe pain in your rectum.  · You cannot have a bowel movement.  This information is not  intended to replace advice given to you by your health care provider. Make sure you discuss any questions you have with your health care provider.  Document Released: 06/09/2003 Document Revised: 05/25/2017 Document Reviewed: 02/12/2017  Elsevier Interactive Patient Education © 2020 [employer]

## 2020-08-05 ENCOUNTER — TRANSCRIBE ORDERS (OUTPATIENT)
Dept: ADMINISTRATIVE | Facility: HOSPITAL | Age: 20
End: 2020-08-05

## 2020-08-05 DIAGNOSIS — R76.11 NONSPECIFIC REACTION TO TUBERCULIN SKIN TEST WITHOUT ACTIVE TUBERCULOSIS: Primary | ICD-10-CM

## 2020-08-12 ENCOUNTER — HOSPITAL ENCOUNTER (OUTPATIENT)
Dept: GENERAL RADIOLOGY | Facility: HOSPITAL | Age: 20
Discharge: HOME | End: 2020-08-12
Admitting: PHYSICIAN ASSISTANT

## 2020-08-12 PROCEDURE — 71046 X-RAY EXAM CHEST 2 VIEWS: CPT

## 2020-11-04 ENCOUNTER — HOSPITAL ENCOUNTER (EMERGENCY)
Facility: HOSPITAL | Age: 20
Discharge: HOME | End: 2020-11-04
Attending: EMERGENCY MEDICINE | Admitting: EMERGENCY MEDICINE

## 2020-11-04 ENCOUNTER — APPOINTMENT (OUTPATIENT)
Dept: MRI IMAGING | Facility: HOSPITAL | Age: 20
End: 2020-11-04

## 2020-11-04 VITALS
BODY MASS INDEX: 36.94 KG/M2 | HEART RATE: 87 BPM | TEMPERATURE: 98.6 F | RESPIRATION RATE: 16 BRPM | SYSTOLIC BLOOD PRESSURE: 139 MMHG | DIASTOLIC BLOOD PRESSURE: 95 MMHG | OXYGEN SATURATION: 99 % | WEIGHT: 258 LBS | HEIGHT: 70 IN

## 2020-11-04 DIAGNOSIS — G93.2 PSEUDOTUMOR CEREBRI SYNDROME: Primary | ICD-10-CM

## 2020-11-04 LAB
ALBUMIN SERPL-MCNC: 4.7 G/DL (ref 3.5–5.2)
ALBUMIN/GLOB SERPL: 1.8 G/DL
ALP SERPL-CCNC: 55 U/L (ref 39–117)
ALT SERPL W P-5'-P-CCNC: 76 U/L (ref 1–33)
ANION GAP SERPL CALCULATED.3IONS-SCNC: 10 MMOL/L (ref 5–15)
APTT PPP: 28 SECONDS (ref 24.1–35)
AST SERPL-CCNC: 37 U/L (ref 1–32)
BASOPHILS # BLD AUTO: 0.1 10*3/MM3 (ref 0–0.2)
BASOPHILS NFR BLD AUTO: 0.9 % (ref 0–1.5)
BILIRUB SERPL-MCNC: 0.5 MG/DL (ref 0–1.2)
BUN SERPL-MCNC: 7 MG/DL (ref 6–20)
BUN/CREAT SERPL: 14 (ref 7–25)
CALCIUM SPEC-SCNC: 9.8 MG/DL (ref 8.6–10.5)
CHLORIDE SERPL-SCNC: 103 MMOL/L (ref 98–107)
CO2 SERPL-SCNC: 25 MMOL/L (ref 22–29)
CREAT SERPL-MCNC: 0.5 MG/DL (ref 0.57–1)
DEPRECATED RDW RBC AUTO: 38.9 FL (ref 37–54)
EOSINOPHIL # BLD AUTO: 0.16 10*3/MM3 (ref 0–0.4)
EOSINOPHIL NFR BLD AUTO: 1.5 % (ref 0.3–6.2)
ERYTHROCYTE [DISTWIDTH] IN BLOOD BY AUTOMATED COUNT: 11.9 % (ref 12.3–15.4)
GFR SERPL CREATININE-BSD FRML MDRD: >150 ML/MIN/1.73
GLOBULIN UR ELPH-MCNC: 2.6 GM/DL
GLUCOSE SERPL-MCNC: 84 MG/DL (ref 65–99)
HCT VFR BLD AUTO: 42.1 % (ref 34–46.6)
HGB BLD-MCNC: 14.5 G/DL (ref 12–15.9)
HOLD SPECIMEN: NORMAL
HOLD SPECIMEN: NORMAL
IMM GRANULOCYTES # BLD AUTO: 0.04 10*3/MM3 (ref 0–0.05)
IMM GRANULOCYTES NFR BLD AUTO: 0.4 % (ref 0–0.5)
INR PPP: 1.06 (ref 0.91–1.09)
LYMPHOCYTES # BLD AUTO: 2.75 10*3/MM3 (ref 0.7–3.1)
LYMPHOCYTES NFR BLD AUTO: 26.1 % (ref 19.6–45.3)
MCH RBC QN AUTO: 30.8 PG (ref 26.6–33)
MCHC RBC AUTO-ENTMCNC: 34.4 G/DL (ref 31.5–35.7)
MCV RBC AUTO: 89.4 FL (ref 79–97)
MONOCYTES # BLD AUTO: 0.68 10*3/MM3 (ref 0.1–0.9)
MONOCYTES NFR BLD AUTO: 6.4 % (ref 5–12)
NEUTROPHILS NFR BLD AUTO: 6.82 10*3/MM3 (ref 1.7–7)
NEUTROPHILS NFR BLD AUTO: 64.7 % (ref 42.7–76)
NRBC BLD AUTO-RTO: 0 /100 WBC (ref 0–0.2)
PLATELET # BLD AUTO: 249 10*3/MM3 (ref 140–450)
PMV BLD AUTO: 11 FL (ref 6–12)
POTASSIUM SERPL-SCNC: 4.3 MMOL/L (ref 3.5–5.2)
PROT SERPL-MCNC: 7.3 G/DL (ref 6–8.5)
PROTHROMBIN TIME: 13.4 SECONDS (ref 11.9–14.6)
RBC # BLD AUTO: 4.71 10*6/MM3 (ref 3.77–5.28)
SODIUM SERPL-SCNC: 138 MMOL/L (ref 136–145)
WBC # BLD AUTO: 10.55 10*3/MM3 (ref 3.4–10.8)
WHOLE BLOOD HOLD SPECIMEN: NORMAL
WHOLE BLOOD HOLD SPECIMEN: NORMAL

## 2020-11-04 PROCEDURE — 70553 MRI BRAIN STEM W/O & W/DYE: CPT

## 2020-11-04 PROCEDURE — 25010000002 LORAZEPAM PER 2 MG: Performed by: NURSE PRACTITIONER

## 2020-11-04 PROCEDURE — 85025 COMPLETE CBC W/AUTO DIFF WBC: CPT | Performed by: EMERGENCY MEDICINE

## 2020-11-04 PROCEDURE — 96374 THER/PROPH/DIAG INJ IV PUSH: CPT

## 2020-11-04 PROCEDURE — 25010000002 LORAZEPAM PER 2 MG: Performed by: EMERGENCY MEDICINE

## 2020-11-04 PROCEDURE — 80053 COMPREHEN METABOLIC PANEL: CPT | Performed by: NURSE PRACTITIONER

## 2020-11-04 PROCEDURE — A9577 INJ MULTIHANCE: HCPCS | Performed by: EMERGENCY MEDICINE

## 2020-11-04 PROCEDURE — 85610 PROTHROMBIN TIME: CPT | Performed by: EMERGENCY MEDICINE

## 2020-11-04 PROCEDURE — 85730 THROMBOPLASTIN TIME PARTIAL: CPT | Performed by: EMERGENCY MEDICINE

## 2020-11-04 PROCEDURE — 96376 TX/PRO/DX INJ SAME DRUG ADON: CPT

## 2020-11-04 PROCEDURE — 0 GADOBENATE DIMEGLUMINE 529 MG/ML SOLUTION: Performed by: EMERGENCY MEDICINE

## 2020-11-04 PROCEDURE — 99284 EMERGENCY DEPT VISIT MOD MDM: CPT

## 2020-11-04 RX ORDER — LORAZEPAM 2 MG/ML
1 INJECTION INTRAMUSCULAR ONCE
Status: COMPLETED | OUTPATIENT
Start: 2020-11-04 | End: 2020-11-04

## 2020-11-04 RX ADMIN — LORAZEPAM 1 MG: 2 INJECTION INTRAMUSCULAR; INTRAVENOUS at 18:05

## 2020-11-04 RX ADMIN — LORAZEPAM 1 MG: 2 INJECTION INTRAMUSCULAR; INTRAVENOUS at 19:59

## 2020-11-04 RX ADMIN — GADOBENATE DIMEGLUMINE 20 ML: 529 INJECTION, SOLUTION INTRAVENOUS at 20:26

## 2020-11-04 NOTE — ED PROVIDER NOTES
Subjective   20 yof presents stating she needs an MRI of the brain as well as a LP.  She states she saw a new eye doctor, Nirmala Almeida, in [place] today. She states she was told her optic nerves are abnormal and she needed testing for increased intracranial pressure (MRI and LP).  The patient is upset as she states 'I've never been told this before.'  She states her vision has not worsened.  She was seen today as a routine examination.  Her last examination was 2/2020.  She states she wears glasses and her vision is 20/20 with the glasses.  She denies headache.  She states she called Dr Brizuela, ophthalmology, for a second opinion and was unable to be seen.          Review of Systems   Constitutional: Negative for activity change, appetite change, fatigue and fever.   HENT: Negative for congestion, ear pain, facial swelling and sore throat.    Eyes: Negative for discharge and visual disturbance.   Respiratory: Negative for apnea, chest tightness, shortness of breath, wheezing and stridor.    Cardiovascular: Negative for chest pain and palpitations.   Gastrointestinal: Negative for abdominal distention, abdominal pain, diarrhea, nausea and vomiting.   Genitourinary: Negative for difficulty urinating and dysuria.   Musculoskeletal: Negative for arthralgias and myalgias.   Skin: Negative for rash and wound.   Neurological: Negative for dizziness and seizures.   Psychiatric/Behavioral: Negative for agitation and confusion.       Past Medical History:   Diagnosis Date   • Anxiety     Anxiety Disorder, Generalized   • Hypothyroid        No Known Allergies    Past Surgical History:   Procedure Laterality Date   • WISDOM TOOTH EXTRACTION         Family History   Problem Relation Age of Onset   • Heart disease Other    • Heart disease Maternal Grandmother    • Heart disease Maternal Grandfather    • Heart disease Paternal Grandmother    • Heart disease Paternal Grandfather    • Colon polyps Paternal Grandfather    • Colon  cancer Neg Hx        Social History     Socioeconomic History   • Marital status: Single     Spouse name: Not on file   • Number of children: Not on file   • Years of education: Not on file   • Highest education level: Not on file   Tobacco Use   • Smoking status: Never Smoker   • Smokeless tobacco: Never Used   Substance and Sexual Activity   • Alcohol use: No   • Drug use: No   • Sexual activity: Defer   Social History Narrative    Child does not have Bleeding or Bruising Disorder    Not Exposed to Second Hand Smoke           Objective   Physical Exam  Vitals signs and nursing note reviewed.   Constitutional:       Appearance: She is well-developed.   HENT:      Head: Normocephalic.   Eyes:      Pupils: Pupils are equal, round, and reactive to light.   Neck:      Musculoskeletal: Normal range of motion and neck supple.   Cardiovascular:      Rate and Rhythm: Normal rate and regular rhythm.      Heart sounds: No murmur.   Pulmonary:      Effort: Pulmonary effort is normal.      Breath sounds: Normal breath sounds.   Abdominal:      General: Bowel sounds are normal.      Palpations: Abdomen is soft.   Musculoskeletal: Normal range of motion.   Skin:     General: Skin is warm and dry.   Neurological:      Mental Status: She is alert and oriented to person, place, and time.         Procedures           ED Course  ED Course as of Nov 04 2134 Wed Nov 04, 2020   1741 I discussed the LP with the patient. After an in depth discussion, she elected to have the procedure completed.    [KS]   1750 The patient is in MRI having an anxiety attack.  Ativan ordered.   We will also add a CMP as she will be receiving contrast for the MRI.    [KS]   1912 [facility]    [KS]   1945 We still need the MRI to do the LP    [JH]   2002 Patient has been here six hours. The delay in disposition is secondary to not getting her MRI yet     [JH]   2106 Patient was endorsed to me at shift change. Please see primary  providers full note for H and P.       We finally got the MRI back. I had a long talk with the patient. I think her opthomologist was concerned for IIH (formerly known as pseduo tumor). She tells me she has no symptoms at this time and does not wish for an LP. I did explain to her that the only way to diagnosis the condition but at this time she would like to hold on this. She states she has no symptoms. Her MRI was read as negative. Given this I did speak to neurology (Eber) who will have her call the office. He does not recommend any medication at this time.     []   2126 This patient was not evaluated by me. Dr. Lee took over care of this patient from Radha SPENCER.     [TW]      ED Course User Index  [JH] Frederic Lee MD  [KS] Radha Land APRN  [TW] Jade Alcantara APRN            MRI Brain With & Without Contrast   Final Result   1. Negative MR imaging the brain. No acute intracranial process.   This report was finalized on 11/04/2020 20:53 by Dr. Cortes Moreno MD.        Labs Reviewed   COMPREHENSIVE METABOLIC PANEL - Abnormal; Notable for the following components:       Result Value    Creatinine 0.50 (*)     ALT (SGPT) 76 (*)     AST (SGOT) 37 (*)     All other components within normal limits    Narrative:     GFR Normal >60  Chronic Kidney Disease <60  Kidney Failure <15     CBC WITH AUTO DIFFERENTIAL - Abnormal; Notable for the following components:    RDW 11.9 (*)     All other components within normal limits   APTT - Normal   PROTIME-INR - Normal   CULTURE, CSF   ANAEROBIC CULTURE   FUNGUS CULTURE   RAINBOW DRAW    Narrative:     The following orders were created for panel order [place] Draw.  Procedure                               Abnormality         Status                     ---------                               -----------         ------                     Light Blue Top[[id number]]                                   Final result               Green  Top (Gel)[[id number]]                                  Final result               Lavender Top[[id number]]                                     Final result               Red Top[[id number]]                                          Final result                 Please view results for these tests on the individual orders.   GLUCOSE, CSF   PROTEIN, CSF   CELL COUNT CSF   CELL COUNT CSF   LIGHT BLUE TOP   GREEN TOP   LAVENDER TOP   RED TOP   CBC AND DIFFERENTIAL    Narrative:     The following orders were created for panel order CBC & Differential.  Procedure                               Abnormality         Status                     ---------                               -----------         ------                     CBC Auto Differential[[id number]]        Abnormal            Final result                 Please view results for these tests on the individual orders.   CELL COUNT WITH DIFFERENTIAL, CSF    Narrative:     The following orders were created for panel order Cell Count With Differential, CSF Use CSF Tube: 1.  Procedure                               Abnormality         Status                     ---------                               -----------         ------                     Cell Count, CSF - Cerebr...[[id number]]                                                   Please view results for these tests on the individual orders.   CELL COUNT WITH DIFFERENTIAL, CSF    Narrative:     The following orders were created for panel order Cell Count With Differential, CSF Use CSF Tube: 4.  Procedure                               Abnormality         Status                     ---------                               -----------         ------                     Cell Count, CSF - Cerebr...[[id number]]                                                   Please view results for these tests on the individual orders.                                       [facility]    Final diagnoses:   Pseudotumor cerebri syndrome            Rosa  Frederic Kolb MD  11/04/20 2119       Frederic Lee MD  11/04/20 [id number]

## 2020-11-05 RX ORDER — NORETHINDRONE ACETATE AND ETHINYL ESTRADIOL 1; 20 MG/1; UG/1
TABLET ORAL
Qty: 63 TABLET | Refills: 3 | Status: SHIPPED | OUTPATIENT
Start: 2020-11-05 | End: 2021-03-05 | Stop reason: SDUPTHER

## 2020-11-05 NOTE — DISCHARGE INSTRUCTIONS
Macy,    I spoke with the neurologist who wishes that you call his office at 8 am on 11/5 (tomorrow).

## 2020-11-20 ENCOUNTER — OFFICE VISIT (OUTPATIENT)
Dept: NEUROLOGY | Facility: CLINIC | Age: 20
End: 2020-11-20

## 2020-11-20 VITALS
WEIGHT: 250 LBS | HEART RATE: 75 BPM | DIASTOLIC BLOOD PRESSURE: 76 MMHG | OXYGEN SATURATION: 98 % | BODY MASS INDEX: 35.79 KG/M2 | SYSTOLIC BLOOD PRESSURE: 112 MMHG | HEIGHT: 70 IN

## 2020-11-20 DIAGNOSIS — H47.323 DRUSEN OF BOTH OPTIC DISCS: Primary | ICD-10-CM

## 2020-11-20 PROCEDURE — 99203 OFFICE O/P NEW LOW 30 MIN: CPT | Performed by: PHYSICIAN ASSISTANT

## 2020-11-20 NOTE — PROGRESS NOTES
Neurology Consult Note    Referring Provider: Emergency Department    Reason for Consultation:    Abnormal appearance optic nerves    Subjective     History of Present Illness:  This is a very pleasant 20-year-old right-hand-dominant female routinely cared for by Dr. Jose Briseno MD, who comes to clinic today as a referral from the emergency department.  1 evening, when I was on call, the patient was referred to the emergency department by her optometrist in [place] who after undergoing a routine eye examination, notified the patient that she had swollen optic nerves and needed to have an MRI of the brain and a spinal tap.  She was told to go to the emergency room to do so and, therefore, went to the emergency room at [facility].  Here, MRI of the brain was normal.  The patient has no headaches, no visual disturbance no dizziness, no cough syncope, no paresthesias no scoliosis or other developmental or focal neurologic complaints.  I recommended that the patient not be placed on any medication in the interim and be referred to us for further evaluation as an outpatient.    Of note, the patient was seen by an ophthalmologist in February of this year due to foreign body in the eye and there was no comment at that time on an abnormal appearance of the optic nerve or optic disc.    Medical records from [facility], from 11/4/2020 this is a diagnosis of papilledema with indistinct optic nerve margins being described.      Past Medical History:   Diagnosis Date   • Anxiety     Anxiety Disorder, Generalized   • Hypothyroid        No Known Allergies  Current Outpatient Medications on File Prior to Visit   Medication Sig   • desloratadine (Clarinex) 5 MG tablet Take 5 mg by mouth Daily.   • FLUoxetine (PROzac) 20 MG capsule Take 20 mg by mouth Daily.   • levothyroxine (SYNTHROID, LEVOTHROID) 125 MCG tablet Take 50 mcg by mouth Daily.   • montelukast (SINGULAIR) 10 MG tablet Take 10 mg by mouth Daily.   •  norethindrone-ethinyl estradiol (Junel 1/20) 1-20 MG-MCG per tablet Take 1 tablet by mouth Daily.   • propranolol (INDERAL) 20 MG tablet Take 40 mg by mouth 2 (Two) Times a Day.   • [DISCONTINUED] hydrocortisone (ANUSOL-HC) 2.5 % rectal cream Insert  into the rectum 2 (Two) Times a Day.   • [DISCONTINUED] hydrOXYzine (ATARAX) 25 MG tablet Take 1 tablet by mouth 3 (Three) Times a Day As Needed for Anxiety.   • [DISCONTINUED] venlafaxine (EFFEXOR) 75 MG tablet Take 75 mg by mouth Daily.     No current facility-administered medications on file prior to visit.        Social History     Socioeconomic History   • Marital status: Single     Spouse name: Not on file   • Number of children: Not on file   • Years of education: Not on file   • Highest education level: Not on file   Tobacco Use   • Smoking status: Never Smoker   • Smokeless tobacco: Never Used   Substance and Sexual Activity   • Alcohol use: No   • Drug use: No   • Sexual activity: Defer   Social History Narrative    Child does not have Bleeding or Bruising Disorder    Not Exposed to Second Hand Smoke     Family History   Problem Relation Age of Onset   • Heart disease Other    • Heart disease Maternal Grandmother    • Heart disease Maternal Grandfather    • Heart disease Paternal Grandmother    • Heart disease Paternal Grandfather    • Colon polyps Paternal Grandfather    • Colon cancer Neg Hx        Review of Systems  A 14 point review of systems was reviewed and was negative.    Objective      Vital Signs  Heart Rate:  [75] 75  BP: (112)/(76) 112/76    General Exam:  Head:  Normocephalic, atraumatic.  HEENT: PERRLA.  Full EOM.  Fundoscopic: Both optic nerve discs appear to be fairly crisp although I see in the left eye more so than the right in the superior medial aspect possible indistinct or showing the may be consistent with drusen deposition.  Neck:  No lymphadenopathy, thyromegaly or bruit.  Cardiac:  Regular rate and rhythm.  Normal S1, S2.  No murmur,  rub or gallop.  Lungs:  Clear to auscultation bilaterally.  No wheeze, rales or rhonchi.  Abdomen:  Non-tender, Non-distended.  Bowel sounds normoactive.  Extremities: Full peripheral pulses.  No clubbing, cyanosis or edema.  Skin: No ulceration, breakdown or rash.      Neurologic Exam:  Mental Status:    -Awake. Alert. Oriented to person, place & time.  -No word finding difficulties.  -No aphasia.  -No dysarthria.  -Follows simple commands.     CN II:  Full visual fields with confrontation.  Pupils equally reactive to light.  CN III, IV, VI:  Extraocular muscles function intact with no nystagmus.  CN V:  Facial sensory is symmetric.  CN VII:  Facial motor symmetric.  CN VIII:  Gross hearing intact bilaterally.  CN IX/X:  Palate elevates symmetrically.  CN XI:  Shoulder shrug symmetric.  CN XII:  Tongue is midline on protrusion.     Motor: (strength out of 5:  1= minimal movement, 2 = movement in plane of gravity, 3 = movement against gravity, 4 = movement against some resistance, 5 = full strength)     -5/5 in bilateral biceps, triceps, brachioradialis, wrist extensors and intrinsic muscles of the hand.    -5/5 in bilateral hip flexors, quadriceps, hamstrings, gastrocsoleus complex, anterior tibialis and extensor hallucis longus.       Deep Tendon Reflexes:  -Right              Biceps: 2+         Triceps: 2+      Brachioradialis: 2+              Patella: 2+       Ankle: 2+         Babinski:  negative  -Left              Biceps: 2+         Triceps: 2+      Brachioradialis: 2+              Patella: 2+       Ankle: 2+         Babinski:  negative    Tone (Modified Maite Scale):  No appreciable increase in tone or rigidity noted.     Sensory:  -Intact to light touch, pinprick BUE (C5-T1) and BLE (L2-S1).     Coordination:  -Finger to nose intact BUEs  -Heel to shin intact BLEs  -No ataxia     Gait  -No signs of ataxia  -ambulates unassisted    Results Review:  MRI of the brain from 11/4/2020 demonstrates no acute  intracranial abnormality.  There is no flattening of the posterior globe of the eye.  There is no abnormal enhancement of the optic nerve sheath.  There is no tortuosity to the optic nerves.        Assessment/Plan     Impression:  1.  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  On basic funduscopic exam in the office today, the patient does not have any evidence of optic nerve swelling.  She may have a small amount of optic nerve drusen, but no significant pallor or indistinct margins to the optic disc.    2.  As the patient has a normal MRI of the brain and has no headache or other symptoms as described above, I would not advocate for lumbar puncture.    3.  Rather, I have referred her to ophthalmology for specific analysis of the optic nerves and follow-up regarding her borderline pressures as documented on the above office note which were 20 and 24 at the time of that November for examination.    4.  If the patient were to develop any other complaints, I am certainly happy to reevaluate at any point in future, however, do not believe the patient needs to follow-up with neurology or have any further diagnostic evaluation at this time.          Nader Mays PA-C  11/20/20  14:17 CST

## 2020-11-26 ENCOUNTER — NURSE TRIAGE (OUTPATIENT)
Dept: CALL CENTER | Facility: HOSPITAL | Age: 20
End: 2020-11-26

## 2020-11-27 NOTE — TELEPHONE ENCOUNTER
"    Reason for Disposition  • [1] COVID-19 EXPOSURE (Close Contact) AND [2] within last 14 days BUT [3] NO symptoms    Additional Information  • Negative: COVID-19 has been diagnosed by a healthcare provider (HCP)  • Negative: COVID-19 lab test positive  • Negative: [1] Symptoms of COVID-19 (e.g., cough, fever, SOB, or others) AND [2] lives in an area with community spread  • Negative: [1] Symptoms of COVID-19 (e.g., cough, fever, SOB, or others) AND [2] within 14 days of EXPOSURE (close contact) with diagnosed or suspected COVID-19 patient  • Negative: [1] Symptoms of COVID-19 (e.g., cough, fever, SOB, or others) AND [2] within 14 days of travel from high-risk area for COVID-19 community spread (identified by CDC)  • Negative: [1] Difficulty breathing (shortness of breath) occurs AND [2] onset > 14 days after COVID-19 EXPOSURE (Close Contact) AND [3] no community spread where patient lives  • Negative: [1] Dry cough occurs AND [2] onset > 14 days after COVID-19 EXPOSURE AND [3] no community spread where patient lives  • Negative: [1] Wet cough (i.e., white-yellow, yellow, green, or prashanth colored sputum) AND [2] onset > 14 days after COVID-19 EXPOSURE AND [3] no community spread where patient lives  • Negative: [1] Common cold symptoms AND [2] onset > 14 days after COVID-19 EXPOSURE AND [3] no community spread where patient lives  • Negative: [1] COVID-19 EXPOSURE (Close Contact) within last 14 days AND [2] needs COVID-19 lab test to return to work AND [3] NO symptoms  • Negative: [1] COVID-19 EXPOSURE (Close Contact) within last 14 days AND [2] 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 CLOSE CONTACT: \"Who is the person with the confirmed or suspected COVID-19 infection that you were exposed to?\"      Aunt  2. PLACE of " "CONTACT: \"Where were you when you were exposed to COVID-19?\" (e.g., home, school, medical waiting room; which city?)      Visiting   3. TYPE of CONTACT: \"How much contact was there?\" (e.g., sitting next to, live in same house, work in same office, same building)      In the same house   4. DURATION of CONTACT: \"How long were you in contact with the COVID-19 patient?\" (e.g., a few seconds, passed by person, a few minutes, live with the patient)      Visiting   5. DATE of CONTACT: \"When did you have contact with a COVID-19 patient?\" (e.g., how many days ago)      11/25/2020  6. TRAVEL: \"Have you traveled out of the country recently?\" If so, \"When and where?\"      * Also ask about out-of-state travel, since the [facility] has identified some high-risk cities for community spread in the .      * Note: Travel becomes less relevant if there is widespread community transmission where the patient lives.      No   7. COMMUNITY SPREAD: \"Are there lots of cases of COVID-19 (community spread) where you live?\" (See public health department website, if unsure)        Widespread   8. SYMPTOMS: \"Do you have any symptoms?\" (e.g., fever, cough, breathing difficulty)      None   9. PREGNANCY OR POSTPARTUM: \"Is there any chance you are pregnant?\" \"When was your last menstrual period?\" \"Did you deliver in the last 2 weeks?\"      No   10. HIGH RISK: \"Do you have any heart or lung problems? Do you have a weak immune system?\" (e.g., CHF, COPD, asthma, HIV positive, chemotherapy, renal failure, diabetes mellitus, sickle cell anemia)        No    Protocols used: CORONAVIRUS (COVID-19) EXPOSURE-ADULT-AH      "

## 2020-12-17 ENCOUNTER — TELEPHONE (OUTPATIENT)
Dept: NEUROLOGY | Facility: CLINIC | Age: 20
End: 2020-12-17

## 2020-12-17 NOTE — TELEPHONE ENCOUNTER
Mirna from Dr Tomy Pace office called and said Nader Mays referred patient to them and they saw her yesterday and ran tests and everything was normal but Dr Brizuela does recommend patient to see Nader Mays again for lumbar puncture and she is going to fax over everything from the appointment now.

## 2020-12-29 ENCOUNTER — PATIENT MESSAGE (OUTPATIENT)
Dept: OBGYN | Age: 20
End: 2020-12-29

## 2020-12-30 DIAGNOSIS — E03.9 HYPOTHYROIDISM, UNSPECIFIED TYPE: ICD-10-CM

## 2020-12-30 DIAGNOSIS — L65.9 HAIR LOSS: ICD-10-CM

## 2020-12-30 LAB
CORTISOL - AM: 14.9 UG/DL (ref 6.2–19.4)
ESTRADIOL LEVEL: 354.3 PG/ML
GLUCOSE BLD-MCNC: 95 MG/DL (ref 74–109)
HBA1C MFR BLD: 5.3 % (ref 4–6)
INSULIN: 26 MU/L (ref 2.6–24.9)
PROGESTERONE LEVEL: <0.05 NG/ML
T3 FREE: 3.1 PG/ML (ref 2–4.4)
T4 FREE: 1.02 NG/DL (ref 0.93–1.7)
TSH SERPL DL<=0.05 MIU/L-ACNC: 6.28 UIU/ML (ref 0.27–4.2)
VITAMIN B-12: 361 PG/ML (ref 211–946)
VITAMIN D 25-HYDROXY: 15.5 NG/ML

## 2020-12-30 NOTE — TELEPHONE ENCOUNTER
From: Sydnee Schultz  To: Rivera Durán MD  Sent: 12/29/2020 7:21 PM CST  Subject: Non-Urgent Medical Question    I have hypothyroidism and my hair has gotten noticeably thinner within the past year. Dr. Dora Kraft recommended the hormone testing at [facility]. I contacted them and they sent me a list of the tests they wanted run. Is that something you all could order for me? Thank you.

## 2021-01-01 LAB — THYROID PEROXIDASE (TPO) ABS: 94 IU/ML (ref 0–9)

## 2021-01-02 LAB — DHEAS (DHEA SULFATE): 168 UG/DL (ref 65–380)

## 2021-01-03 LAB — TESTOSTERONE, FEMALES/CHILDREN: 45 NG/DL (ref 9–55)

## 2021-01-04 LAB — T3 REVERSE: 12.3 NG/DL (ref 9–27)

## 2021-01-05 LAB — ESTRONE: 223.9 PG/ML

## 2021-01-11 RX ORDER — ERGOCALCIFEROL 1.25 MG/1
50000 CAPSULE ORAL WEEKLY
Qty: 8 CAPSULE | Refills: 0 | Status: SHIPPED | OUTPATIENT
Start: 2021-01-11 | End: 2022-03-14

## 2021-03-05 ENCOUNTER — OFFICE VISIT (OUTPATIENT)
Dept: OBGYN CLINIC | Age: 21
End: 2021-03-05
Payer: COMMERCIAL

## 2021-03-05 VITALS
HEIGHT: 70 IN | SYSTOLIC BLOOD PRESSURE: 140 MMHG | BODY MASS INDEX: 37.22 KG/M2 | DIASTOLIC BLOOD PRESSURE: 90 MMHG | WEIGHT: 260 LBS | HEART RATE: 75 BPM

## 2021-03-05 DIAGNOSIS — Z01.419 ENCOUNTER FOR GYNECOLOGICAL EXAMINATION WITHOUT ABNORMAL FINDING: Primary | ICD-10-CM

## 2021-03-05 DIAGNOSIS — R30.0 DYSURIA: ICD-10-CM

## 2021-03-05 DIAGNOSIS — Z30.41 ORAL CONTRACEPTIVE PILL SURVEILLANCE: ICD-10-CM

## 2021-03-05 DIAGNOSIS — Z12.4 SCREENING FOR CERVICAL CANCER: ICD-10-CM

## 2021-03-05 PROCEDURE — 99395 PREV VISIT EST AGE 18-39: CPT | Performed by: NURSE PRACTITIONER

## 2021-03-05 RX ORDER — NORETHINDRONE ACETATE AND ETHINYL ESTRADIOL 1; .02 MG/1; MG/1
TABLET ORAL
Qty: 63 TABLET | Refills: 3 | Status: SHIPPED | OUTPATIENT
Start: 2021-03-05 | End: 2021-08-25

## 2021-03-05 ASSESSMENT — ENCOUNTER SYMPTOMS
EYES NEGATIVE: 1
GASTROINTESTINAL NEGATIVE: 1
CONSTIPATION: 0
ALLERGIC/IMMUNOLOGIC NEGATIVE: 1
DIARRHEA: 0
RESPIRATORY NEGATIVE: 1

## 2021-03-05 NOTE — PROGRESS NOTES
Janeth Andrew is a 23 y.o. female who presents today for her medical conditions/ complaints as noted below. Janeth Andrew is c/o of Gynecologic Exam        HPI   Pt presents for annual exam and pap smear. Doing well with OCP, no trouble with cysts. Not sexually active. Mammo:NA   Pap smear:NA   Contraception:OCP     P:0  Ab:0  Bone density:NA   Colonoscopy:NA  No LMP recorded.     Past Medical History:   Diagnosis Date    Anxiety     Fatty liver     Ovarian cyst      Past Surgical History:   Procedure Laterality Date    WISDOM TOOTH EXTRACTION       Family History   Problem Relation Age of Onset    Other Mother         kidney stones    Thyroid Disease Mother     Cancer Other         unknown, great maternal grandmother     Social History     Tobacco Use    Smoking status: Never Smoker    Smokeless tobacco: Never Used   Substance Use Topics    Alcohol use: No       Current Outpatient Medications   Medication Sig Dispense Refill    norethindrone-ethinyl estradiol (JUNEL 1/20) 1-20 MG-MCG per tablet TAKE 1 TABLET BY MOUTH EVERY DAY 63 tablet 3    oxyCODONE-acetaminophen (PERCOCET) 5-325 MG per tablet Take 1 tablet by mouth every 4 hours as needed for Pain. Indications: Last time she took one was Thursday night      ibuprofen (ADVIL;MOTRIN) 800 MG tablet Take 1 tablet by mouth 3 times daily (with meals) 90 tablet 0    levothyroxine (SYNTHROID) 50 MCG tablet       montelukast (SINGULAIR) 10 MG tablet Take 1 tablet by mouth daily 30 tablet 3    fluticasone (FLONASE) 50 MCG/ACT nasal spray 1 spray by Nasal route daily 1 Bottle 1    vitamin D (ERGOCALCIFEROL) 1.25 MG (83938 UT) CAPS capsule Take 1 capsule by mouth once a week for 8 doses 8 capsule 0    propranolol (INDERAL) 20 MG tablet Take 1 tablet by mouth 3 times daily as needed (panic attack) 20 tablet 1     No current facility-administered medications for this visit.       No Known Allergies  Vitals:    21 1556   BP: (!) 144/98 Pulse: 75     Body mass index is 37.31 kg/m². Review of Systems   Constitutional: Negative. HENT: Negative. Eyes: Negative. Respiratory: Negative. Cardiovascular: Negative. Gastrointestinal: Negative. Negative for constipation and diarrhea. Endocrine: Negative. Genitourinary: Negative. Negative for frequency, menstrual problem and urgency. Musculoskeletal: Negative. Skin: Negative. Allergic/Immunologic: Negative. Neurological: Negative. Hematological: Negative. Psychiatric/Behavioral: Negative. All other systems reviewed and are negative. Physical Exam  Vitals signs and nursing note reviewed. Constitutional:       Appearance: She is well-developed. HENT:      Head: Normocephalic. Neck:      Musculoskeletal: Normal range of motion and neck supple. Thyroid: No thyroid mass or thyromegaly. Cardiovascular:      Rate and Rhythm: Normal rate and regular rhythm. Pulmonary:      Effort: Pulmonary effort is normal.      Breath sounds: Normal breath sounds. Chest:      Breasts:         Right: No inverted nipple, mass, nipple discharge or skin change. Left: No inverted nipple, mass, nipple discharge or skin change. Abdominal:      Palpations: Abdomen is soft. There is no mass. Tenderness: There is no abdominal tenderness. Genitourinary:     General: Normal vulva. Vagina: Normal.      Cervix: No cervical motion tenderness. Uterus: Normal. Not enlarged. Adnexa:         Right: No mass or tenderness. Left: No mass or tenderness. Comments: Pap collected  Small amount of blood on pap brush  Musculoskeletal: Normal range of motion. Skin:     General: Skin is warm and dry. Neurological:      Mental Status: She is alert and oriented to person, place, and time.    Psychiatric:         Attention and Perception: Attention normal.         Mood and Affect: Mood normal.         Speech: Speech normal. Behavior: Behavior normal.         Thought Content: Thought content normal.         Cognition and Memory: Cognition normal.         Judgment: Judgment normal.          Diagnosis Orders   1. Encounter for gynecological examination without abnormal finding     2. Oral contraceptive pill surveillance  norethindrone-ethinyl estradiol (JUNEL 1/20) 1-20 MG-MCG per tablet   3. Screening for cervical cancer  PAP SMEAR   4. Dysuria         MEDICATIONS:  Orders Placed This Encounter   Medications    norethindrone-ethinyl estradiol (JUNEL 1/20) 1-20 MG-MCG per tablet     Sig: TAKE 1 TABLET BY MOUTH EVERY DAY     Dispense:  63 tablet     Refill:  3       ORDERS:  Orders Placed This Encounter   Procedures    PAP SMEAR       PLAN:  Pap collected  Refilled OCP    Patient Instructions   Patient Education        Breast Self-Exam: Care Instructions  Your Care Instructions     A breast self-exam is when you check your breasts for lumps or changes. This regular exam helps you learn how your breasts normally look and feel. Most breast problems or changes are not because of cancer. Breast self-exam is not a substitute for a mammogram. Having regular breast exams by your doctor and regular mammograms improve your chances of finding any problems with your breasts. Some women set a time each month to do a step-by-step breast self-exam. Other women like a less formal system. They might look at their breasts as they brush their teeth, or feel their breasts once in a while in the shower. If you notice a change in your breast, tell your doctor. Follow-up care is a key part of your treatment and safety. Be sure to make and go to all appointments, and call your doctor if you are having problems. It's also a good idea to know your test results and keep a list of the medicines you take.   How do you do a breast self-exam? · The best time to examine your breasts is usually one week after your menstrual period begins. Your breasts should not be tender then. If you do not have periods, you might do your exam on a day of the month that is easy to remember. · To examine your breasts:  ? Remove all your clothes above the waist and lie down. When you are lying down, your breast tissue spreads evenly over your chest wall, which makes it easier to feel all your breast tissue. ? Use the padsnot the fingertipsof the 3 middle fingers of your left hand to check your right breast. Move your fingers slowly in small coin-sized circles that overlap. ? Use three levels of pressure to feel of all your breast tissue. Use light pressure to feel the tissue close to the skin surface. Use medium pressure to feel a little deeper. Use firm pressure to feel your tissue close to your breastbone and ribs. Use each pressure level to feel your breast tissue before moving on to the next spot. ? Check your entire breast, moving up and down as if following a strip from the collarbone to the bra line, and from the armpit to the ribs. Repeat until you have covered the entire breast.  ? Repeat this procedure for your left breast, using the pads of the 3 middle fingers of your right hand. · To examine your breasts while in the shower:  ? Place one arm over your head and lightly soap your breast on that side. ? Using the pads of your fingers, gently move your hand over your breast (in the strip pattern described above), feeling carefully for any lumps or changes. ? Repeat for the other breast.  · Have your doctor inspect anything you notice to see if you need further testing. Where can you learn more? Go to https://anthony.[employer]. org and sign in to your [employer] account. Enter P148 in the [facility] box to learn more about \"Breast Self-Exam: Care Instructions. \" If you do not have an account, please click on the \"Sign Up Now\" link. Current as of: April 29, 2020               Content Version: 12.6  © 2006-2020 [employer]. Care instructions adapted under license by [facility] [place] ([facility]). If you have questions about a medical condition or this instruction, always ask your healthcare professional. [address] any warranty or liability for your use of this information.

## 2021-03-05 NOTE — PATIENT INSTRUCTIONS
Patient Education        Breast Self-Exam: Care Instructions  Your Care Instructions     A breast self-exam is when you check your breasts for lumps or changes. This regular exam helps you learn how your breasts normally look and feel. Most breast problems or changes are not because of cancer. Breast self-exam is not a substitute for a mammogram. Having regular breast exams by your doctor and regular mammograms improve your chances of finding any problems with your breasts. Some women set a time each month to do a step-by-step breast self-exam. Other women like a less formal system. They might look at their breasts as they brush their teeth, or feel their breasts once in a while in the shower. If you notice a change in your breast, tell your doctor. Follow-up care is a key part of your treatment and safety. Be sure to make and go to all appointments, and call your doctor if you are having problems. It's also a good idea to know your test results and keep a list of the medicines you take. How do you do a breast self-exam?  · The best time to examine your breasts is usually one week after your menstrual period begins. Your breasts should not be tender then. If you do not have periods, you might do your exam on a day of the month that is easy to remember. · To examine your breasts:  ? Remove all your clothes above the waist and lie down. When you are lying down, your breast tissue spreads evenly over your chest wall, which makes it easier to feel all your breast tissue. ? Use the padsnot the fingertipsof the 3 middle fingers of your left hand to check your right breast. Move your fingers slowly in small coin-sized circles that overlap. ? Use three levels of pressure to feel of all your breast tissue. Use light pressure to feel the tissue close to the skin surface. Use medium pressure to feel a little deeper. Use firm pressure to feel your tissue close to your breastbone and ribs. Use each pressure level to feel your breast tissue before moving on to the next spot. ? Check your entire breast, moving up and down as if following a strip from the collarbone to the bra line, and from the armpit to the ribs. Repeat until you have covered the entire breast.  ? Repeat this procedure for your left breast, using the pads of the 3 middle fingers of your right hand. · To examine your breasts while in the shower:  ? Place one arm over your head and lightly soap your breast on that side. ? Using the pads of your fingers, gently move your hand over your breast (in the strip pattern described above), feeling carefully for any lumps or changes. ? Repeat for the other breast.  · Have your doctor inspect anything you notice to see if you need further testing. Where can you learn more? Go to https://[employer]. org and sign in to your [employer] account. Enter P148 in the [facility] box to learn more about \"Breast Self-Exam: Care Instructions. \"     If you do not have an account, please click on the \"Sign Up Now\" link. Current as of: April 29, 2020               Content Version: 12.6  © 4103-4454 [employer]. Care instructions adapted under license by [facility] ([facility]). If you have questions about a medical condition or this instruction, always ask your healthcare professional. [place] [address] any warranty or liability for your use of this information.

## 2021-08-23 ENCOUNTER — NURSE TRIAGE (OUTPATIENT)
Dept: CALL CENTER | Facility: HOSPITAL | Age: 21
End: 2021-08-23

## 2021-08-24 NOTE — TELEPHONE ENCOUNTER
"    Reason for Disposition  • [1] Strep throat EXPOSURE (i.e., meets definition) AND [2] no sore throat    Additional Information  • Negative: SEVERE difficulty breathing (e.g., struggling for each breath, speaks in single words, stridor)  • Negative: Sounds like a life-threatening emergency to the triager  • Negative: Call about throat culture results  • Negative: [1] Sore throat AND [2] diagnosed Strep throat AND [3] taking antibiotic  • Negative: [1] Sore throat AND [2] no known Strep throat EXPOSURE (i.e., does not meet definition)  • Negative: [1] Sore throat AND [2] strep throat EXPOSURE (i.e., meets definition) > 10 days ago  • Negative: [1] Drooling or spitting out saliva (because can't swallow) AND [2] new-onset  • Negative: [1] Difficulty breathing AND [2] not severe  • Negative: Fever > 104 F (40 C)  • Negative: [1] Refuses to drink anything AND [2] for > 12 hours  • Negative: [1] Drinking very little AND [2] dehydration suspected (e.g., no urine > 12 hours, very dry mouth, very lightheaded)  • Negative: Patient sounds very sick or weak to the triager  • Negative: SEVERE (e.g., excruciating) throat pain  • Negative: [1] Positive throat culture or rapid strep test (according to lab, PCP, caller, etc.) AND [2] NO standing order to call in prescription for antibiotic  • Negative: [1] Sore throat AND [2] strep throat EXPOSURE (i.e., meets definition) within past 10 days  • Negative: [1] Positive throat culture or rapid strep test (according to lab, PCP, caller, etc.) AND [2] standing order to call in prescription for antibiotic    Answer Assessment - Initial Assessment Questions  1. STREP EXPOSURE: \"Was the exposure to someone who lives within your home?\" If not, ask: \"How much contact did you have with the sick individual?\"       yes  2. ONSET: \"How many days ago did the contact occur?\"      3  3. PROVEN STREP: \"Are you sure the person with strep had a positive throat culture or rapid strep test?\"       " "Caller is positive for strep  4. STREP SYMPTOMS: \"Do YOU have a sore throat, fever, or other symptoms suggestive of strep?\"       yes  5. VIRAL SYMPTOMS: \"Are there any symptoms of a cold, such as a runny nose, cough, hoarse voice?\"     Runny nose  6. PREGNANCY: \"Is there any chance you are pregnant?\" \"When was your last menstrual period?\"      na    Protocols used: STREP THROAT EXPOSURE-ADULT-AH      "

## 2021-08-25 DIAGNOSIS — Z30.41 ORAL CONTRACEPTIVE PILL SURVEILLANCE: ICD-10-CM

## 2021-08-25 RX ORDER — NORETHINDRONE ACETATE AND ETHINYL ESTRADIOL 1; .02 MG/1; MG/1
TABLET ORAL
Qty: 42 TABLET | Refills: 1 | Status: SHIPPED | OUTPATIENT
Start: 2021-08-25 | End: 2021-09-14 | Stop reason: SDUPTHER

## 2021-09-05 ENCOUNTER — PATIENT MESSAGE (OUTPATIENT)
Dept: OBGYN CLINIC | Age: 21
End: 2021-09-05

## 2021-09-05 DIAGNOSIS — R10.2 PELVIC PAIN: Primary | ICD-10-CM

## 2021-09-05 DIAGNOSIS — Z87.42 HISTORY OF OVARIAN CYST: ICD-10-CM

## 2021-09-07 NOTE — TELEPHONE ENCOUNTER
From: Juan Alberto Yang  To: ERI Miles - CNP  Sent: 9/5/2021 2:36 PM CDT  Subject: Non-Urgent Medical Question    hello, i have seen you before due to a cyst i have on my right ovary. every so often, the cyst will cause me pain. this weekend it has been causing me some pain and this time i have had some light bleeding. it hasnt been a lot of blood, and aside from that and some pain/discomfort i feel fine. is this normal for ovarian cysts?

## 2021-09-08 ENCOUNTER — HOSPITAL ENCOUNTER (OUTPATIENT)
Dept: ULTRASOUND IMAGING | Age: 21
Discharge: HOME | End: 2021-09-08
Payer: COMMERCIAL

## 2021-09-08 DIAGNOSIS — R10.2 PELVIC PAIN: ICD-10-CM

## 2021-09-08 DIAGNOSIS — Z87.42 HISTORY OF OVARIAN CYST: ICD-10-CM

## 2021-09-08 PROCEDURE — 76830 TRANSVAGINAL US NON-OB: CPT

## 2021-09-14 DIAGNOSIS — Z30.41 ORAL CONTRACEPTIVE PILL SURVEILLANCE: ICD-10-CM

## 2021-09-14 RX ORDER — NORETHINDRONE ACETATE AND ETHINYL ESTRADIOL 1; .02 MG/1; MG/1
TABLET ORAL
Qty: 42 TABLET | Refills: 5 | Status: SHIPPED | OUTPATIENT
Start: 2021-09-14 | End: 2022-03-14

## 2021-09-15 ENCOUNTER — TELEPHONE (OUTPATIENT)
Dept: OBGYN CLINIC | Age: 21
End: 2021-09-15

## 2021-09-15 NOTE — TELEPHONE ENCOUNTER
Zacarias Mitchell called from [facility] saying patient was seen yesterday and an OCP was called in but they have not received anything. I returned their call and confirmed that It was sent in yesterday but signed this morning @ 9:53 am and sent to the \"clinic pharmacy. \" She confirmed that they did receive it.

## 2021-10-12 ENCOUNTER — HOSPITAL ENCOUNTER (OUTPATIENT)
Dept: PREADMISSION TESTING | Age: 21
Discharge: HOME | End: 2021-10-16
Payer: COMMERCIAL

## 2021-10-12 ENCOUNTER — OFFICE VISIT (OUTPATIENT)
Dept: OBGYN CLINIC | Age: 21
End: 2021-10-12

## 2021-10-12 VITALS
HEIGHT: 70 IN | DIASTOLIC BLOOD PRESSURE: 96 MMHG | BODY MASS INDEX: 36.36 KG/M2 | WEIGHT: 254 LBS | SYSTOLIC BLOOD PRESSURE: 146 MMHG | HEART RATE: 81 BPM

## 2021-10-12 VITALS — BODY MASS INDEX: 35.07 KG/M2 | WEIGHT: 245 LBS | HEIGHT: 70 IN

## 2021-10-12 DIAGNOSIS — N83.201 RIGHT OVARIAN CYST: Primary | ICD-10-CM

## 2021-10-12 DIAGNOSIS — R10.2 PELVIC PAIN: ICD-10-CM

## 2021-10-12 LAB
BASOPHILS ABSOLUTE: 0.1 K/UL (ref 0–0.2)
BASOPHILS RELATIVE PERCENT: 0.7 % (ref 0–1)
EOSINOPHILS ABSOLUTE: 0.1 K/UL (ref 0–0.6)
EOSINOPHILS RELATIVE PERCENT: 1.6 % (ref 0–5)
HCT VFR BLD CALC: 44.3 % (ref 37–47)
HEMOGLOBIN: 14.4 G/DL (ref 12–16)
IMMATURE GRANULOCYTES #: 0 K/UL
LYMPHOCYTES ABSOLUTE: 2.2 K/UL (ref 1.1–4.5)
LYMPHOCYTES RELATIVE PERCENT: 27 % (ref 20–40)
MCH RBC QN AUTO: 30.6 PG (ref 27–31)
MCHC RBC AUTO-ENTMCNC: 32.5 G/DL (ref 33–37)
MCV RBC AUTO: 94.3 FL (ref 81–99)
MONOCYTES ABSOLUTE: 0.5 K/UL (ref 0–0.9)
MONOCYTES RELATIVE PERCENT: 6.5 % (ref 0–10)
NEUTROPHILS ABSOLUTE: 5.3 K/UL (ref 1.5–7.5)
NEUTROPHILS RELATIVE PERCENT: 63.7 % (ref 50–65)
PDW BLD-RTO: 12.2 % (ref 11.5–14.5)
PLATELET # BLD: 258 K/UL (ref 130–400)
PMV BLD AUTO: 10.9 FL (ref 9.4–12.3)
RBC # BLD: 4.7 M/UL (ref 4.2–5.4)
SARS-COV-2, PCR: NOT DETECTED
WBC # BLD: 8.3 K/UL (ref 4.8–10.8)

## 2021-10-12 PROCEDURE — 85025 COMPLETE CBC W/AUTO DIFF WBC: CPT

## 2021-10-12 PROCEDURE — U0005 INFEC AGEN DETEC AMPLI PROBE: HCPCS

## 2021-10-12 PROCEDURE — U0003 INFECTIOUS AGENT DETECTION BY NUCLEIC ACID (DNA OR RNA); SEVERE ACUTE RESPIRATORY SYNDROME CORONAVIRUS 2 (SARS-COV-2) (CORONAVIRUS DISEASE [COVID-19]), AMPLIFIED PROBE TECHNIQUE, MAKING USE OF HIGH THROUGHPUT TECHNOLOGIES AS DESCRIBED BY CMS-2020-01-R: HCPCS

## 2021-10-12 PROCEDURE — PREOPEXAM PRE-OP EXAM: Performed by: OBSTETRICS & GYNECOLOGY

## 2021-10-12 RX ORDER — LORAZEPAM 0.5 MG/1
0.5 TABLET ORAL EVERY 6 HOURS PRN
COMMUNITY

## 2021-10-12 RX ORDER — FLUOXETINE HYDROCHLORIDE 40 MG/1
40 CAPSULE ORAL DAILY
COMMUNITY

## 2021-10-12 ASSESSMENT — ENCOUNTER SYMPTOMS
EYES NEGATIVE: 1
GASTROINTESTINAL NEGATIVE: 1
RESPIRATORY NEGATIVE: 1

## 2021-10-12 NOTE — PROGRESS NOTES
I, Sofia Deutsch RN, am scribing for and in the presence of Dr. Lanette Voss 10/12/2021/11:26 AM/sign         10/12/2021    Blanche Rutherford (:  2000) is a 24 y.o. female, here for a preop visit. She is scheduled for a laparoscopic right ovarian cystectomy with DaVinci for a right ovarian cyst and pelvic pain. U/s in 2021 showed   Transvaginal pelvic ultrasound was performed in the transverse and   longitudinal projections. The uterus measures 6.1 x 3.5 x 4 cm in size. The endometrium measures   5 mm in double AP thickness. The cervix is grossly normal in appearance. The right ovary measures 3.2 x 2.2 x 4.7 cm in size with normal color   flow. Arising from the right ovary is a simple cyst measuring 5.2 x   4.2 x 6.7 cm in size. Left ovary measures 3.6 x 2.5 x 1.9 cm in size with small follicles. There is trace fluid in the pelvic cul-de-sac. She has a hx of ovarian cysts. This cyst has been  monitored since 2019. She is a G0    Patient Active Problem List   Diagnosis    Irregular menses       Review of Systems   Constitutional: Negative. HENT: Negative. Eyes: Negative. Respiratory: Negative. Cardiovascular: Negative. Gastrointestinal: Negative. Genitourinary: Positive for pelvic pain. Negative for difficulty urinating, dyspareunia, dysuria, enuresis, frequency, hematuria, menstrual problem, urgency and vaginal discharge. Musculoskeletal: Negative. Skin: Negative. Neurological: Negative. Psychiatric/Behavioral: Negative. Prior to Visit Medications    Medication Sig Taking? Authorizing Provider   LORazepam (ATIVAN) 0.5 MG tablet Take 0.5 mg by mouth every 6 hours as needed for Anxiety.  Yes Historical Provider, MD   FLUoxetine (PROZAC) 40 MG capsule Take 40 mg by mouth daily Yes Historical Provider, MD   norethindrone-ethinyl estradiol (JUNEL 1/20) 1-20 MG-MCG per tablet TAKE 1 TABLET BY MOUTH EVERY DAY  Patient taking differently: Take 1 tablet by mouth daily TAKE 1 TABLET BY MOUTH EVERY DAY Yes Teresita Sawyer MD   vitamin D (ERGOCALCIFEROL) 1.25 MG (93990 UT) CAPS capsule Take 1 capsule by mouth once a week for 8 doses Yes Teresita Sawyer MD   propranolol (INDERAL) 20 MG tablet Take 1 tablet by mouth 3 times daily as needed (panic attack)  Patient taking differently: Take 40 mg by mouth daily  Yes ERI Aparicio CNM   levothyroxine (SYNTHROID) 75 MCG tablet Take 75 mcg by mouth Daily  Yes Historical Provider, MD        No Known Allergies    Past Medical History:   Diagnosis Date    Anxiety     Fatty liver     Ovarian cyst        Past Surgical History:   Procedure Laterality Date    WISDOM TOOTH EXTRACTION         Social History     Socioeconomic History    Marital status: Single     Spouse name: Not on file    Number of children: Not on file    Years of education: Not on file    Highest education level: Not on file   Occupational History    Not on file   Tobacco Use    Smoking status: Never Smoker    Smokeless tobacco: Never Used   Vaping Use    Vaping Use: Never used   Substance and Sexual Activity    Alcohol use: No    Drug use: No    Sexual activity: Never   Other Topics Concern    Not on file   Social History Narrative    Not on file     Social Determinants of Health     Financial Resource Strain:     Difficulty of Paying Living Expenses:    Food Insecurity:     Worried About Running Out of Food in the Last Year:     Ran Out of Food in the Last Year:    Transportation Needs:     Lack of Transportation (Medical):      Lack of Transportation (Non-Medical):    Physical Activity:     Days of Exercise per Week:     Minutes of Exercise per Session:    Stress:     Feeling of Stress :    Social Connections:     Frequency of Communication with Friends and Family:     Frequency of Social Gatherings with Friends and Family:     Attends Amish Services:     Active Member of Clubs or Organizations:     Attends Club or Organization Meetings:     Marital Status:    Intimate Partner Violence:     Fear of Current or Ex-Partner:     Emotionally Abused:     Physically Abused:     Sexually Abused:         Family History   Problem Relation Age of Onset    Other Mother         kidney stones    Thyroid Disease Mother     High Blood Pressure Mother     High Blood Pressure Father     Cancer Other         unknown, great maternal grandmother       ADVANCE DIRECTIVE: N, <no information>    Vitals:    10/12/21 1049   BP: (!) 146/96   Pulse: 81   Weight: 254 lb (115.2 kg)   Height: 5' 10\" (1.778 m)     Estimated body mass index is 36.45 kg/m² as calculated from the following:    Height as of this encounter: 5' 10\" (1.778 m). Weight as of this encounter: 254 lb (115.2 kg). Physical Exam  Constitutional:       Appearance: She is well-developed. HENT:      Head: Normocephalic and atraumatic. Right Ear: Hearing normal.      Left Ear: Hearing normal.      Nose: Nose normal.   Eyes:      General: Lids are normal.      Conjunctiva/sclera: Conjunctivae normal.      Pupils: Pupils are equal, round, and reactive to light. Cardiovascular:      Rate and Rhythm: Normal rate and regular rhythm. Pulmonary:      Effort: Pulmonary effort is normal.      Breath sounds: Normal breath sounds. Abdominal:      General: There is no distension. Palpations: Abdomen is soft. Tenderness: There is no abdominal tenderness. Musculoskeletal:         General: Normal range of motion. Cervical back: Normal range of motion and neck supple. Comments: Normal ROM in all four extremities; normal gait   Skin:     General: Skin is warm and dry. Neurological:      Mental Status: She is alert and oriented to person, place, and time. Psychiatric:         Behavior: Behavior normal.         No flowsheet data found.     Lab Results   Component Value Date    GLUCOSE 95 12/30/2020    LABA1C 5.3 12/30/2020       The ASCVD Risk score (Judson Corona

## 2021-10-14 ENCOUNTER — HOSPITAL ENCOUNTER (OUTPATIENT)
Age: 21
Setting detail: OUTPATIENT SURGERY
Discharge: HOME | End: 2021-10-14
Attending: OBSTETRICS & GYNECOLOGY | Admitting: OBSTETRICS & GYNECOLOGY
Payer: COMMERCIAL

## 2021-10-14 ENCOUNTER — NURSE TRIAGE (OUTPATIENT)
Dept: CALL CENTER | Facility: HOSPITAL | Age: 21
End: 2021-10-14

## 2021-10-14 ENCOUNTER — ANESTHESIA (OUTPATIENT)
Dept: OPERATING ROOM | Age: 21
End: 2021-10-14
Payer: COMMERCIAL

## 2021-10-14 ENCOUNTER — ANESTHESIA EVENT (OUTPATIENT)
Dept: OPERATING ROOM | Age: 21
End: 2021-10-14
Payer: COMMERCIAL

## 2021-10-14 VITALS — DIASTOLIC BLOOD PRESSURE: 52 MMHG | SYSTOLIC BLOOD PRESSURE: 108 MMHG | OXYGEN SATURATION: 99 % | TEMPERATURE: 98.2 F

## 2021-10-14 VITALS
OXYGEN SATURATION: 97 % | BODY MASS INDEX: 35.07 KG/M2 | HEIGHT: 70 IN | DIASTOLIC BLOOD PRESSURE: 91 MMHG | TEMPERATURE: 98.6 F | HEART RATE: 79 BPM | RESPIRATION RATE: 18 BRPM | SYSTOLIC BLOOD PRESSURE: 131 MMHG | WEIGHT: 245 LBS

## 2021-10-14 DIAGNOSIS — N83.8 PARATUBAL CYST: Primary | ICD-10-CM

## 2021-10-14 LAB — HCG(URINE) PREGNANCY TEST: NEGATIVE

## 2021-10-14 PROCEDURE — 3700000001 HC ADD 15 MINUTES (ANESTHESIA): Performed by: OBSTETRICS & GYNECOLOGY

## 2021-10-14 PROCEDURE — 7100000000 HC PACU RECOVERY - FIRST 15 MIN: Performed by: OBSTETRICS & GYNECOLOGY

## 2021-10-14 PROCEDURE — 2580000003 HC RX 258: Performed by: OBSTETRICS & GYNECOLOGY

## 2021-10-14 PROCEDURE — 3600000019 HC SURGERY ROBOT ADDTL 15MIN: Performed by: OBSTETRICS & GYNECOLOGY

## 2021-10-14 PROCEDURE — 7100000011 HC PHASE II RECOVERY - ADDTL 15 MIN: Performed by: OBSTETRICS & GYNECOLOGY

## 2021-10-14 PROCEDURE — 6370000000 HC RX 637 (ALT 250 FOR IP): Performed by: ANESTHESIOLOGY

## 2021-10-14 PROCEDURE — 7100000001 HC PACU RECOVERY - ADDTL 15 MIN: Performed by: OBSTETRICS & GYNECOLOGY

## 2021-10-14 PROCEDURE — 2580000003 HC RX 258: Performed by: ANESTHESIOLOGY

## 2021-10-14 PROCEDURE — 2580000003 HC RX 258: Performed by: NURSE ANESTHETIST, CERTIFIED REGISTERED

## 2021-10-14 PROCEDURE — 58661 LAPAROSCOPY REMOVE ADNEXA: CPT | Performed by: OBSTETRICS & GYNECOLOGY

## 2021-10-14 PROCEDURE — 7100000010 HC PHASE II RECOVERY - FIRST 15 MIN: Performed by: OBSTETRICS & GYNECOLOGY

## 2021-10-14 PROCEDURE — 3600000009 HC SURGERY ROBOT BASE: Performed by: OBSTETRICS & GYNECOLOGY

## 2021-10-14 PROCEDURE — 2720000010 HC SURG SUPPLY STERILE: Performed by: OBSTETRICS & GYNECOLOGY

## 2021-10-14 PROCEDURE — 2709999900 HC NON-CHARGEABLE SUPPLY: Performed by: OBSTETRICS & GYNECOLOGY

## 2021-10-14 PROCEDURE — 2500000003 HC RX 250 WO HCPCS: Performed by: NURSE ANESTHETIST, CERTIFIED REGISTERED

## 2021-10-14 PROCEDURE — 6360000002 HC RX W HCPCS: Performed by: NURSE ANESTHETIST, CERTIFIED REGISTERED

## 2021-10-14 PROCEDURE — 3700000000 HC ANESTHESIA ATTENDED CARE: Performed by: OBSTETRICS & GYNECOLOGY

## 2021-10-14 PROCEDURE — 6360000002 HC RX W HCPCS: Performed by: ANESTHESIOLOGY

## 2021-10-14 PROCEDURE — 88304 TISSUE EXAM BY PATHOLOGIST: CPT

## 2021-10-14 PROCEDURE — 6360000002 HC RX W HCPCS: Performed by: OBSTETRICS & GYNECOLOGY

## 2021-10-14 PROCEDURE — 84703 CHORIONIC GONADOTROPIN ASSAY: CPT

## 2021-10-14 PROCEDURE — S2900 ROBOTIC SURGICAL SYSTEM: HCPCS | Performed by: OBSTETRICS & GYNECOLOGY

## 2021-10-14 RX ORDER — HYDRALAZINE HYDROCHLORIDE 20 MG/ML
5 INJECTION INTRAMUSCULAR; INTRAVENOUS EVERY 10 MIN PRN
Status: DISCONTINUED | OUTPATIENT
Start: 2021-10-14 | End: 2021-10-14 | Stop reason: HOSPADM

## 2021-10-14 RX ORDER — MORPHINE SULFATE 2 MG/ML
2 INJECTION, SOLUTION INTRAMUSCULAR; INTRAVENOUS EVERY 5 MIN PRN
Status: DISCONTINUED | OUTPATIENT
Start: 2021-10-14 | End: 2021-10-14 | Stop reason: HOSPADM

## 2021-10-14 RX ORDER — FENTANYL CITRATE 50 UG/ML
INJECTION, SOLUTION INTRAMUSCULAR; INTRAVENOUS PRN
Status: DISCONTINUED | OUTPATIENT
Start: 2021-10-14 | End: 2021-10-14 | Stop reason: SDUPTHER

## 2021-10-14 RX ORDER — HYDROMORPHONE HYDROCHLORIDE 1 MG/ML
0.25 INJECTION, SOLUTION INTRAMUSCULAR; INTRAVENOUS; SUBCUTANEOUS EVERY 5 MIN PRN
Status: DISCONTINUED | OUTPATIENT
Start: 2021-10-14 | End: 2021-10-14 | Stop reason: HOSPADM

## 2021-10-14 RX ORDER — FENTANYL CITRATE 50 UG/ML
50 INJECTION, SOLUTION INTRAMUSCULAR; INTRAVENOUS
Status: DISCONTINUED | OUTPATIENT
Start: 2021-10-14 | End: 2021-10-14 | Stop reason: HOSPADM

## 2021-10-14 RX ORDER — SODIUM CHLORIDE, SODIUM LACTATE, POTASSIUM CHLORIDE, CALCIUM CHLORIDE 600; 310; 30; 20 MG/100ML; MG/100ML; MG/100ML; MG/100ML
INJECTION, SOLUTION INTRAVENOUS CONTINUOUS PRN
Status: DISCONTINUED | OUTPATIENT
Start: 2021-10-14 | End: 2021-10-14 | Stop reason: SDUPTHER

## 2021-10-14 RX ORDER — DIPHENHYDRAMINE HYDROCHLORIDE 50 MG/ML
12.5 INJECTION INTRAMUSCULAR; INTRAVENOUS
Status: DISCONTINUED | OUTPATIENT
Start: 2021-10-14 | End: 2021-10-14 | Stop reason: HOSPADM

## 2021-10-14 RX ORDER — SCOLOPAMINE TRANSDERMAL SYSTEM 1 MG/1
1 PATCH, EXTENDED RELEASE TRANSDERMAL ONCE
Status: DISCONTINUED | OUTPATIENT
Start: 2021-10-14 | End: 2021-10-14 | Stop reason: HOSPADM

## 2021-10-14 RX ORDER — HYDROCODONE BITARTRATE AND ACETAMINOPHEN 5; 325 MG/1; MG/1
1 TABLET ORAL EVERY 6 HOURS PRN
Qty: 28 TABLET | Refills: 0 | Status: SHIPPED | OUTPATIENT
Start: 2021-10-14 | End: 2021-10-21

## 2021-10-14 RX ORDER — MEPERIDINE HYDROCHLORIDE 25 MG/ML
12.5 INJECTION INTRAMUSCULAR; INTRAVENOUS; SUBCUTANEOUS EVERY 5 MIN PRN
Status: DISCONTINUED | OUTPATIENT
Start: 2021-10-14 | End: 2021-10-14 | Stop reason: HOSPADM

## 2021-10-14 RX ORDER — HYDROMORPHONE HYDROCHLORIDE 1 MG/ML
0.5 INJECTION, SOLUTION INTRAMUSCULAR; INTRAVENOUS; SUBCUTANEOUS EVERY 5 MIN PRN
Status: DISCONTINUED | OUTPATIENT
Start: 2021-10-14 | End: 2021-10-14 | Stop reason: HOSPADM

## 2021-10-14 RX ORDER — PROPOFOL 10 MG/ML
INJECTION, EMULSION INTRAVENOUS PRN
Status: DISCONTINUED | OUTPATIENT
Start: 2021-10-14 | End: 2021-10-14 | Stop reason: SDUPTHER

## 2021-10-14 RX ORDER — PROMETHAZINE HYDROCHLORIDE 25 MG/ML
6.25 INJECTION, SOLUTION INTRAMUSCULAR; INTRAVENOUS
Status: DISCONTINUED | OUTPATIENT
Start: 2021-10-14 | End: 2021-10-14 | Stop reason: HOSPADM

## 2021-10-14 RX ORDER — SODIUM CHLORIDE 0.9 % (FLUSH) 0.9 %
5-40 SYRINGE (ML) INJECTION EVERY 12 HOURS SCHEDULED
Status: DISCONTINUED | OUTPATIENT
Start: 2021-10-14 | End: 2021-10-14 | Stop reason: HOSPADM

## 2021-10-14 RX ORDER — SODIUM CHLORIDE 0.9 % (FLUSH) 0.9 %
5-40 SYRINGE (ML) INJECTION PRN
Status: DISCONTINUED | OUTPATIENT
Start: 2021-10-14 | End: 2021-10-14 | Stop reason: HOSPADM

## 2021-10-14 RX ORDER — SODIUM CHLORIDE 9 MG/ML
25 INJECTION, SOLUTION INTRAVENOUS PRN
Status: DISCONTINUED | OUTPATIENT
Start: 2021-10-14 | End: 2021-10-14 | Stop reason: HOSPADM

## 2021-10-14 RX ORDER — LIDOCAINE HYDROCHLORIDE 10 MG/ML
INJECTION, SOLUTION EPIDURAL; INFILTRATION; INTRACAUDAL; PERINEURAL PRN
Status: DISCONTINUED | OUTPATIENT
Start: 2021-10-14 | End: 2021-10-14 | Stop reason: SDUPTHER

## 2021-10-14 RX ORDER — LIDOCAINE HYDROCHLORIDE 10 MG/ML
1 INJECTION, SOLUTION EPIDURAL; INFILTRATION; INTRACAUDAL; PERINEURAL
Status: DISCONTINUED | OUTPATIENT
Start: 2021-10-14 | End: 2021-10-14 | Stop reason: HOSPADM

## 2021-10-14 RX ORDER — MORPHINE SULFATE 4 MG/ML
4 INJECTION, SOLUTION INTRAMUSCULAR; INTRAVENOUS EVERY 5 MIN PRN
Status: DISCONTINUED | OUTPATIENT
Start: 2021-10-14 | End: 2021-10-14 | Stop reason: HOSPADM

## 2021-10-14 RX ORDER — ONDANSETRON 2 MG/ML
INJECTION INTRAMUSCULAR; INTRAVENOUS PRN
Status: DISCONTINUED | OUTPATIENT
Start: 2021-10-14 | End: 2021-10-14 | Stop reason: SDUPTHER

## 2021-10-14 RX ORDER — MIDAZOLAM HYDROCHLORIDE 1 MG/ML
INJECTION INTRAMUSCULAR; INTRAVENOUS PRN
Status: DISCONTINUED | OUTPATIENT
Start: 2021-10-14 | End: 2021-10-14 | Stop reason: SDUPTHER

## 2021-10-14 RX ORDER — METOCLOPRAMIDE HYDROCHLORIDE 5 MG/ML
10 INJECTION INTRAMUSCULAR; INTRAVENOUS
Status: COMPLETED | OUTPATIENT
Start: 2021-10-14 | End: 2021-10-14

## 2021-10-14 RX ORDER — MORPHINE SULFATE 4 MG/ML
4 INJECTION, SOLUTION INTRAMUSCULAR; INTRAVENOUS
Status: DISCONTINUED | OUTPATIENT
Start: 2021-10-14 | End: 2021-10-14 | Stop reason: HOSPADM

## 2021-10-14 RX ORDER — ENALAPRILAT 2.5 MG/2ML
1.25 INJECTION INTRAVENOUS
Status: DISCONTINUED | OUTPATIENT
Start: 2021-10-14 | End: 2021-10-14 | Stop reason: HOSPADM

## 2021-10-14 RX ORDER — DEXAMETHASONE SODIUM PHOSPHATE 10 MG/ML
INJECTION, SOLUTION INTRAMUSCULAR; INTRAVENOUS PRN
Status: DISCONTINUED | OUTPATIENT
Start: 2021-10-14 | End: 2021-10-14 | Stop reason: SDUPTHER

## 2021-10-14 RX ORDER — LABETALOL HYDROCHLORIDE 5 MG/ML
5 INJECTION, SOLUTION INTRAVENOUS EVERY 10 MIN PRN
Status: DISCONTINUED | OUTPATIENT
Start: 2021-10-14 | End: 2021-10-14 | Stop reason: HOSPADM

## 2021-10-14 RX ORDER — ROCURONIUM BROMIDE 10 MG/ML
INJECTION, SOLUTION INTRAVENOUS PRN
Status: DISCONTINUED | OUTPATIENT
Start: 2021-10-14 | End: 2021-10-14 | Stop reason: SDUPTHER

## 2021-10-14 RX ORDER — SODIUM CHLORIDE, SODIUM LACTATE, POTASSIUM CHLORIDE, CALCIUM CHLORIDE 600; 310; 30; 20 MG/100ML; MG/100ML; MG/100ML; MG/100ML
INJECTION, SOLUTION INTRAVENOUS CONTINUOUS
Status: DISCONTINUED | OUTPATIENT
Start: 2021-10-14 | End: 2021-10-14 | Stop reason: HOSPADM

## 2021-10-14 RX ORDER — MIDAZOLAM HYDROCHLORIDE 1 MG/ML
2 INJECTION INTRAMUSCULAR; INTRAVENOUS
Status: COMPLETED | OUTPATIENT
Start: 2021-10-14 | End: 2021-10-14

## 2021-10-14 RX ADMIN — FENTANYL CITRATE 100 MCG: 50 INJECTION, SOLUTION INTRAMUSCULAR; INTRAVENOUS at 08:17

## 2021-10-14 RX ADMIN — METOCLOPRAMIDE HYDROCHLORIDE 10 MG: 5 INJECTION INTRAMUSCULAR; INTRAVENOUS at 10:38

## 2021-10-14 RX ADMIN — FENTANYL CITRATE 50 MCG: 50 INJECTION, SOLUTION INTRAMUSCULAR; INTRAVENOUS at 08:25

## 2021-10-14 RX ADMIN — SUGAMMADEX 200 MG: 100 INJECTION, SOLUTION INTRAVENOUS at 09:04

## 2021-10-14 RX ADMIN — MIDAZOLAM 2 MG: 1 INJECTION INTRAMUSCULAR; INTRAVENOUS at 07:03

## 2021-10-14 RX ADMIN — MORPHINE SULFATE 4 MG: 4 INJECTION, SOLUTION INTRAMUSCULAR; INTRAVENOUS at 09:40

## 2021-10-14 RX ADMIN — HYDROMORPHONE HYDROCHLORIDE 1 MG: 1 INJECTION, SOLUTION INTRAMUSCULAR; INTRAVENOUS; SUBCUTANEOUS at 09:02

## 2021-10-14 RX ADMIN — ROCURONIUM BROMIDE 50 MG: 10 INJECTION, SOLUTION INTRAVENOUS at 08:17

## 2021-10-14 RX ADMIN — HYDRALAZINE HYDROCHLORIDE 5 MG: 20 INJECTION INTRAMUSCULAR; INTRAVENOUS at 09:29

## 2021-10-14 RX ADMIN — ONDANSETRON HYDROCHLORIDE 4 MG: 2 INJECTION, SOLUTION INTRAMUSCULAR; INTRAVENOUS at 08:17

## 2021-10-14 RX ADMIN — PROPOFOL 150 MG: 10 INJECTION, EMULSION INTRAVENOUS at 08:17

## 2021-10-14 RX ADMIN — LIDOCAINE HYDROCHLORIDE 50 MG: 10 INJECTION, SOLUTION EPIDURAL; INFILTRATION; INTRACAUDAL; PERINEURAL at 08:17

## 2021-10-14 RX ADMIN — SODIUM CHLORIDE, POTASSIUM CHLORIDE, SODIUM LACTATE AND CALCIUM CHLORIDE: 600; 310; 30; 20 INJECTION, SOLUTION INTRAVENOUS at 09:54

## 2021-10-14 RX ADMIN — MIDAZOLAM 2 MG: 1 INJECTION INTRAMUSCULAR; INTRAVENOUS at 08:09

## 2021-10-14 RX ADMIN — HYDROMORPHONE HYDROCHLORIDE 0.5 MG: 1 INJECTION, SOLUTION INTRAMUSCULAR; INTRAVENOUS; SUBCUTANEOUS at 09:23

## 2021-10-14 RX ADMIN — SODIUM CHLORIDE, SODIUM LACTATE, POTASSIUM CHLORIDE, AND CALCIUM CHLORIDE: 600; 310; 30; 20 INJECTION, SOLUTION INTRAVENOUS at 08:09

## 2021-10-14 RX ADMIN — FENTANYL CITRATE 100 MCG: 50 INJECTION, SOLUTION INTRAMUSCULAR; INTRAVENOUS at 08:43

## 2021-10-14 RX ADMIN — DEXAMETHASONE SODIUM PHOSPHATE 10 MG: 10 INJECTION, SOLUTION INTRAMUSCULAR; INTRAVENOUS at 08:17

## 2021-10-14 RX ADMIN — HYDROMORPHONE HYDROCHLORIDE 0.5 MG: 1 INJECTION, SOLUTION INTRAMUSCULAR; INTRAVENOUS; SUBCUTANEOUS at 09:31

## 2021-10-14 RX ADMIN — CEFAZOLIN SODIUM 2000 MG: 1 INJECTION, POWDER, FOR SOLUTION INTRAMUSCULAR; INTRAVENOUS at 08:20

## 2021-10-14 RX ADMIN — SODIUM CHLORIDE, POTASSIUM CHLORIDE, SODIUM LACTATE AND CALCIUM CHLORIDE: 600; 310; 30; 20 INJECTION, SOLUTION INTRAVENOUS at 07:03

## 2021-10-14 ASSESSMENT — PAIN SCALES - GENERAL
PAINLEVEL_OUTOF10: 8
PAINLEVEL_OUTOF10: 5
PAINLEVEL_OUTOF10: 8
PAINLEVEL_OUTOF10: 7
PAINLEVEL_OUTOF10: 8
PAINLEVEL_OUTOF10: 8
PAINLEVEL_OUTOF10: 3
PAINLEVEL_OUTOF10: 8
PAINLEVEL_OUTOF10: 8
PAINLEVEL_OUTOF10: 2

## 2021-10-14 ASSESSMENT — LIFESTYLE VARIABLES: SMOKING_STATUS: 0

## 2021-10-14 ASSESSMENT — PAIN DESCRIPTION - PAIN TYPE: TYPE: SURGICAL PAIN

## 2021-10-14 ASSESSMENT — PAIN SCALES - WONG BAKER: WONGBAKER_NUMERICALRESPONSE: 8

## 2021-10-14 ASSESSMENT — PAIN DESCRIPTION - LOCATION
LOCATION: ABDOMEN
LOCATION: ABDOMEN

## 2021-10-14 NOTE — ANESTHESIA PRE PROCEDURE
Department of Anesthesiology  Preprocedure Note       Name:  Ailyn Spicer   Age:  24 y.o.  :  2000                                          MRN:  [id number]         Date:  10/14/2021      Surgeon: Dilan Vazquez):  Marii Sadler MD    Procedure: Procedure(s):  LAPAROSCOPIC RIGHT OVARIAN CYSTECTOMY WITH DAVINCI    Medications prior to admission:   Prior to Admission medications    Medication Sig Start Date End Date Taking? Authorizing Provider   LORazepam (ATIVAN) 0.5 MG tablet Take 0.5 mg by mouth every 6 hours as needed for Anxiety.     Historical Provider, MD   FLUoxetine (PROZAC) 40 MG capsule Take 40 mg by mouth daily    Historical Provider, MD   norethindrone-ethinyl estradiol (JUNEL 1/20) 1-20 MG-MCG per tablet TAKE 1 [place]  Patient taking differently: Take 1 tablet by mouth daily TAKE 1 TABLET BY MOUTH EVERY DAY 21   Marii Sadler MD   vitamin D (ERGOCALCIFEROL) 1.25 MG (32870 UT) CAPS capsule Take 1 capsule by mouth once a week for 8 doses 1/11/21 10/12/21  Marii Sadler MD   propranolol (INDERAL) 20 MG tablet Take 1 tablet by mouth 3 times daily as needed (panic attack)  Patient taking differently: Take 40 mg by mouth daily  11/18/19 10/12/21  ERI Culver CNM   levothyroxine (SYNTHROID) 75 MCG tablet Take 75 mcg by mouth Daily  7/10/19   Historical Provider, MD       Current medications:    Current Facility-Administered Medications   Medication Dose Route Frequency Provider Last Rate Last Admin    ceFAZolin (ANCEF) 1,000 mg in sterile water 10 mL IV syringe  1,000 mg IntraVENous Once Marii Sadler MD           Allergies:  No Known Allergies    Problem List:    Patient Active Problem List   Diagnosis Code    Irregular menses N92.6       Past Medical History:        Diagnosis Date    Anxiety     Fatty liver     Ovarian cyst        Past Surgical History:        Procedure Laterality Date    WISDOM TOOTH EXTRACTION         Social History:    Social History Tobacco Use    Smoking status: Never Smoker    Smokeless tobacco: Never Used   Substance Use Topics    Alcohol use: No                                Counseling given: Not Answered      Vital Signs (Current): There were no vitals filed for this visit. BP Readings from Last 3 Encounters:   10/12/21 (!) 146/96   03/05/21 (!) 140/90   03/26/20 (!) 140/98       NPO Status:                                                                                 BMI:   Wt Readings from Last 3 Encounters:   10/12/21 254 lb (115.2 kg)   10/12/21 245 lb (111.1 kg)   03/05/21 260 lb (117.9 kg)     There is no height or weight on file to calculate BMI.    CBC:   Lab Results   Component Value Date    WBC 8.3 10/12/2021    RBC 4.70 10/12/2021    HGB 14.4 10/12/2021    HCT 44.3 10/12/2021    MCV 94.3 10/12/2021    RDW 12.2 10/12/2021     10/12/2021       CMP:   Lab Results   Component Value Date     01/18/2015    K 3.7 01/18/2015     01/18/2015    CO2 23 01/18/2015    BUN 9 01/18/2015    CREATININE 0.7 01/18/2015    LABGLOM ND 01/18/2015    GLUCOSE 95 12/30/2020    PROT 7.3 01/18/2015    CALCIUM 9.4 01/18/2015    ALKPHOS 71 01/18/2015    AST 13 01/18/2015    ALT 14 01/18/2015       POC Tests: No results for input(s): POCGLU, POCNA, POCK, POCCL, POCBUN, POCHEMO, POCHCT in the last 72 hours.     Coags: No results found for: PROTIME, INR, APTT    HCG (If Applicable):   Lab Results   Component Value Date    PREGTESTUR Negative 10/14/2021        ABGs: No results found for: PHART, PO2ART, DMM8UQS, GQM1GGY, BEART, R1ZTTRLA     Type & Screen (If Applicable):  No results found for: LABABO, LABRH    Drug/Infectious Status (If Applicable):  No results found for: HIV, HEPCAB    COVID-19 Screening (If Applicable):   Lab Results   Component Value Date    COVID19 Not Detected 10/12/2021           Anesthesia Evaluation  Patient summary reviewed and Nursing notes reviewed no history of anesthetic complications:   Airway: Mallampati: II  TM distance: >3 FB   Neck ROM: full  Mouth opening: > = 3 FB Dental:          Pulmonary:normal exam        (-) not a current smoker                           Cardiovascular:Negative CV ROS             Beta Blocker:  Dose within 24 Hrs         Neuro/Psych:   (+) depression/anxiety             GI/Hepatic/Renal:        (-) GERD, liver disease and no renal disease       Endo/Other:    (+) hypothyroidism::., .    (-) diabetes mellitus               Abdominal:             Vascular: Other Findings:             Anesthesia Plan      general     ASA 2       Induction: intravenous. MIPS: Postoperative opioids intended and Prophylactic antiemetics administered. Anesthetic plan and risks discussed with patient and mother. Plan discussed with CRNA.                   Rozanne Ahumada, MD   10/14/2021

## 2021-10-14 NOTE — OP NOTE
OPERATIVE NOTE        Date of Service: 10/14/21     Pre-operative Diagnosis: RIGHT OVARIAN CYST, PELVIC PAIN    Post-operative Diagnosis:  Same    Procedure: Laparoscopic Right Paratubal cystectomy  Anesthesia: General    Surgeon: Jefferson Lindsay MD    Assistants: First Assistant: Александр Babcock; Teja Donald RN  Scrub Person First: Sofia CHAVEZ Kaba    Procedure Note:  After informed consents obtained, patient taken to the OR, given general endotracheal anesthesia and positioned dorsal lithotomy position. Patient prepped and draped in usual sterile fashion. Falcon catheter placed and manipulator placed into uterus. An incision made above the umbulicus with the knife. Skin elevated up and veress needle placed through the fascia. Placement confirmed with syringe and sterile water. Abdomen insuflated with CO2 and 8mm port placed using optivue obturator and 5mm 0 degree storz scope. Once entry into abdomen confirmed 8mm ports placed 10cm right and left of the umbulicus and an 8mm assistant port placed in the LLQ. Switched to [employer]. Patient placed into trendelenburg and the robot docked. I turned my attention to the surgeon's console. Instruments used are monopolar scissor right hand and bipolar [place] left hand. Right tube torsed approximately 3-4 complete rotations. Cyst drained and removed from tube with monopolar cautery. Ports removed. Skin closed with monocryl 4-0 and dermabond. Patient awakened from anesthesia and transferred to PACU in stable condition. No complications, patient tolerated procedure well.       Estimated Blood Loss: 25 (units unknown)    Complications: None    Specimens:   ID Type Source Tests Collected by Time Destination   A : right paratubal cyst Tissue Fallopian Tube SURGICAL PATHOLOGY Jefferson Lindsay MD 10/14/2021 [address] [place] [address] Angel York MD  10/14/21  9:13 AM

## 2021-10-14 NOTE — H&P
Rufus Mendoza (:  2000) is a 24 y.o. female, here for a preop visit. She is scheduled for a laparoscopic right ovarian cystectomy with Sherman for a right ovarian cyst and pelvic pain. U/s in 2021 showed   Transvaginal pelvic ultrasound was performed in the transverse and   longitudinal projections. The uterus measures 6.1 x 3.5 x 4 cm in size. The endometrium measures   5 mm in double AP thickness. The cervix is grossly normal in appearance. The right ovary measures 3.2 x 2.2 x 4.7 cm in size with normal color   flow. Arising from the right ovary is a simple cyst measuring 5.2 x   4.2 x 6.7 cm in size. Left ovary measures 3.6 x 2.5 x 1.9 cm in size with small follicles. There is trace fluid in the pelvic cul-de-sac.      She has a hx of ovarian cysts. This cyst has been  monitored since 2019. She is a G0         Patient Active Problem List   Diagnosis    Irregular menses         Review of Systems   Constitutional: Negative. HENT: Negative. Eyes: Negative. Respiratory: Negative. Cardiovascular: Negative. Gastrointestinal: Negative. Genitourinary: Positive for pelvic pain. Negative for difficulty urinating, dyspareunia, dysuria, enuresis, frequency, hematuria, menstrual problem, urgency and vaginal discharge. Musculoskeletal: Negative. Skin: Negative. Neurological: Negative. Psychiatric/Behavioral: Negative.                Prior to Visit Medications    Medication Sig Taking? Authorizing Provider   LORazepam (ATIVAN) 0.5 MG tablet Take 0.5 mg by mouth every 6 hours as needed for Anxiety.  Yes Historical Provider, MD   FLUoxetine (PROZAC) 40 MG capsule Take 40 mg by mouth daily Yes Historical Provider, MD   norethindrone-ethinyl estradiol (JUNEL 1/20) 1-20 MG-MCG per tablet TAKE 1 TABLET BY MOUTH EVERY DAY  Patient taking differently: Take 1 tablet by mouth daily TAKE 1 TABLET BY MOUTH EVERY DAY Yes Mary Ann Buck MD   vitamin D (ERGOCALCIFEROL) 1.25 MG (53828 UT) CAPS capsule Take 1 capsule by mouth once a week for 8 doses Yes Alesha Robison MD   propranolol (INDERAL) 20 MG tablet Take 1 tablet by mouth 3 times daily as needed (panic attack)  Patient taking differently: Take 40 mg by mouth daily  Yes ERI Guillermo CNM   levothyroxine (SYNTHROID) 75 MCG tablet Take 75 mcg by mouth Daily  Yes Historical Provider, MD         No Known Allergies     Past Medical History        Past Medical History:   Diagnosis Date    Anxiety      Fatty liver      Ovarian cyst              Past Surgical History         Past Surgical History:   Procedure Laterality Date    WISDOM TOOTH EXTRACTION                Social History               Socioeconomic History    Marital status: Single       Spouse name: Not on file    Number of children: Not on file    Years of education: Not on file    Highest education level: Not on file   Occupational History    Not on file   Tobacco Use    Smoking status: Never Smoker    Smokeless tobacco: Never Used   Vaping Use    Vaping Use: Never used   Substance and Sexual Activity    Alcohol use: No    Drug use: No    Sexual activity: Never   Other Topics Concern    Not on file   Social History Narrative    Not on file      Social Determinants of Health          Financial Resource Strain:     Difficulty of Paying Living Expenses:    Food Insecurity:     Worried About Running Out of Food in the Last Year:     Ran Out of Food in the Last Year:    Transportation Needs:     Lack of Transportation (Medical):      Lack of Transportation (Non-Medical):    Physical Activity:     Days of Exercise per Week:     Minutes of Exercise per Session:    Stress:     Feeling of Stress :    Social Connections:     Frequency of Communication with Friends and Family:     Frequency of Social Gatherings with Friends and Family:     Attends Jain Services:     Active Member of Clubs or Organizations:     Attends Club or Organization Meetings:    Laith Mcmillan Marital Status:    Intimate Partner Violence:     Fear of Current or Ex-Partner:     Emotionally Abused:     Physically Abused:     Sexually Abused:             Family History         Family History   Problem Relation Age of Onset    Other Mother           kidney stones    Thyroid Disease Mother      High Blood Pressure Mother      High Blood Pressure Father      Cancer Other           unknown, great maternal grandmother            /80   Pulse 93   Temp 97.7 °F (36.5 °C) (Tympanic)   Resp 14   Ht 5' 10\" (1.778 m)   Wt 245 lb (111.1 kg)   LMP 10/08/2021 (Exact Date)   SpO2 100%   Breastfeeding No   BMI 35.15 kg/m²     Estimated body mass index is 36.45 kg/m² as calculated from the following:    Height as of this encounter: 5' 10\" (1.778 m). Weight as of this encounter: 254 lb (115.2 kg).    Physical Exam  Constitutional:       Appearance: She is well-developed. HENT:      Head: Normocephalic and atraumatic. Right Ear: Hearing normal.      Left Ear: Hearing normal.      Nose: Nose normal.   Eyes:      General: Lids are normal.      Conjunctiva/sclera: Conjunctivae normal.      Pupils: Pupils are equal, round, and reactive to light. Cardiovascular:      Rate and Rhythm: Normal rate and regular rhythm. Pulmonary:      Effort: Pulmonary effort is normal.      Breath sounds: Normal breath sounds. Abdominal:      General: There is no distension. Palpations: Abdomen is soft. Tenderness: There is no abdominal tenderness. Musculoskeletal:         General: Normal range of motion. Cervical back: Normal range of motion and neck supple. Comments: Normal ROM in all four extremities; normal gait   Skin:     General: Skin is warm and dry. Neurological:      Mental Status: She is alert and oriented to person, place, and time.    Psychiatric:         Behavior: Behavior normal.            No flowsheet data found.           Lab Results   Component Value Date     GLUCOSE 95

## 2021-10-14 NOTE — TELEPHONE ENCOUNTER
Reason for Disposition  • [1] MILD-MODERATE post-op pain (e.g., pain scale 1-7) AND [2] not controlled with pain medications    Additional Information  • Negative: Sounds like a life-threatening emergency to the triager  • Negative: Chest pain  • Negative: Difficulty breathing  • Negative: Acting confused (e.g., disoriented, slurred speech) or excessively sleepy  • Negative: Surgical incision symptoms and questions  • Negative: [1] Discomfort (pain, burning or stinging) when passing urine AND [2] male  • Negative: [1] Discomfort (pain, burning or stinging) when passing urine AND [2] female  • Negative: Constipation  • Negative: New or worsening leg (calf, thigh) pain  • Negative: New or worsening leg swelling  • Negative: Dizziness is severe, or persists > 24 hours after surgery  • Negative: Pain, redness, swelling, or pus at IV Site  • Negative: Symptoms arising from use of a urinary catheter (Chavez or Coude)  • Negative: Cast problems or questions  • Negative: Medication question  • Negative: [1] Widespread rash AND [2] bright red, sunburn-like  • Negative: [1] SEVERE headache AND [2] after spinal (epidural) anesthesia  • Negative: [1] Vomiting AND [2] persists > 4 hours  • Negative: [1] Vomiting AND [2] abdomen looks much more swollen than usual  • Negative: [1] Drinking very little AND [2] dehydration suspected (e.g., no urine > 12 hours, very dry mouth, very lightheaded)  • Negative: Patient sounds very sick or weak to the triager  • Negative: Sounds like a serious complication to the triager  • Negative: Fever > 100.4 F (38.0 C)  • Negative: [1] SEVERE post-op pain (e.g., excruciating, pain scale 8-10) AND [2] not controlled with pain medications  • Negative: [1] Caller has URGENT question AND [2] triager unable to answer question  • Negative: [1] Headache AND [2] after spinal (epidural) anesthesia AND [3] not severe  • Negative: Fever present > 3 days (72 hours)  • Negative: [1] Caller has NON-URGENT  "question AND [2] triager unable to answer question  • Negative: General activity, questions about  • Negative: Resuming driving, questions about  • Negative: Resuming sexual relations, questions about  • Negative: Getting the incision wet, questions about    Answer Assessment - Initial Assessment Questions  1. SYMPTOM: \"What's the main symptom you're concerned about?\" (e.g., pain, fever, vomiting)      Pain, asking if she can take ibuprophen between doses of hydrocodone   2. ONSET: \"When did pain   start?\"      Ongoing.    3. SURGERY: \"What surgery was performed?\"      OB-GN surgery done at another facility.    4. DATE of SURGERY: \"When was surgery performed?\"       10/14/2021  5. ANESTHESIA: \" What type of anesthesia did you have?\" (e.g., general, spinal, epidural, local)      Unknown    6. PAIN: \"Is there any pain?\" If Yes, ask: \"How bad is it?\"  (Scale 1-10; or mild, moderate, severe)      Moderate.    7. FEVER: \"Do you have a fever?\" If Yes, ask: \"What is your temperature, how was it measured, and when did it start?\"      No   8. VOMITING: \"Is there any vomiting?\" If yes, ask: \"How many times?\"      No   9. BLEEDING: \"Is there any bleeding?\" If Yes, ask: \"How much?\" and \"Where?\"      No   10. OTHER SYMPTOMS: \"Do you have any other symptoms?\" (e.g., drainage from wound, painful urination, constipation)        No    Protocols used: POST-OP SYMPTOMS AND QUESTIONS-ADULT-AH      "

## 2021-10-14 NOTE — PROGRESS NOTES
CLINICAL PHARMACY NOTE: MEDS TO BEDS    Total # of Prescriptions Filled: 1   The following medications were delivered to the patient:  · Norco 5/325 mg    Additional Documentation:    Handed script to family in [place].

## 2021-10-18 ENCOUNTER — TELEPHONE (OUTPATIENT)
Dept: OBGYN CLINIC | Age: 21
End: 2021-10-18

## 2021-10-18 ENCOUNTER — NURSE TRIAGE (OUTPATIENT)
Dept: CALL CENTER | Facility: HOSPITAL | Age: 21
End: 2021-10-18

## 2021-10-18 NOTE — TELEPHONE ENCOUNTER
Reviewed guideline with caller, had procedure done at [facility]. Advised to call her GYN and check with them if this is a normal thing after ovarian cystectomy. Caller agrees to follow care advice.     Reason for Disposition  • [1] Caller has NON-URGENT question AND [2] triager unable to answer question    Additional Information  • Negative: Sounds like a life-threatening emergency to the triager  • Negative: Chest pain  • Negative: Difficulty breathing  • Negative: Acting confused (e.g., disoriented, slurred speech) or excessively sleepy  • Negative: Surgical incision symptoms and questions  • Negative: [1] Discomfort (pain, burning or stinging) when passing urine AND [2] male  • Negative: [1] Discomfort (pain, burning or stinging) when passing urine AND [2] female  • Negative: Constipation  • Negative: New or worsening leg (calf, thigh) pain  • Negative: New or worsening leg swelling  • Negative: Dizziness is severe, or persists > 24 hours after surgery  • Negative: Pain, redness, swelling, or pus at IV Site  • Negative: Symptoms arising from use of a urinary catheter (Chavez or Coude)  • Negative: Cast problems or questions  • Negative: Medication question  • Negative: [1] Widespread rash AND [2] bright red, sunburn-like  • Negative: [1] SEVERE headache AND [2] after spinal (epidural) anesthesia  • Negative: [1] Vomiting AND [2] persists > 4 hours  • Negative: [1] Vomiting AND [2] abdomen looks much more swollen than usual  • Negative: [1] Drinking very little AND [2] dehydration suspected (e.g., no urine > 12 hours, very dry mouth, very lightheaded)  • Negative: Patient sounds very sick or weak to the triager  • Negative: Sounds like a serious complication to the triager  • Negative: Fever > 100.4 F (38.0 C)  • Negative: [1] SEVERE post-op pain (e.g., excruciating, pain scale 8-10) AND [2] not controlled with pain medications  • Negative: [1] Caller has URGENT question AND [2] triager unable to answer question  •  "Negative: [1] Headache AND [2] after spinal (epidural) anesthesia AND [3] not severe  • Negative: Fever present > 3 days (72 hours)  • Negative: [1] MILD-MODERATE post-op pain (e.g., pain scale 1-7) AND [2] not controlled with pain medications    Answer Assessment - Initial Assessment Questions  1. SYMPTOM: \"What's the main symptom you're concerned about?\" (e.g., pain, fever, vomiting)      Vaginal bleeding following ovarian Cystectomy.   2. ONSET: \"When did bleeding  start?\"       Today   3. SURGERY: \"What surgery was performed?\"      Ovarian cystectomy   4. DATE of SURGERY: \"When was surgery performed?\"       Thursday   5. ANESTHESIA: \" What type of anesthesia did you have?\" (e.g., general, spinal, epidural, local)      General   6. PAIN: \"Is there any pain?\" If Yes, ask: \"How bad is it?\"  (Scale 1-10; or mild, moderate, severe)      no  7. FEVER: \"Do you have a fever?\" If Yes, ask: \"What is your temperature, how was it measured, and when did it start?\"      No   8. VOMITING: \"Is there any vomiting?\" If yes, ask: \"How many times?\"      No   9. BLEEDING: \"Is there any bleeding?\" If Yes, ask: \"How much?\" and \"Where?\"      Vaginal bleeding   10. OTHER SYMPTOMS: \"Do you have any other symptoms?\" (e.g., drainage from wound, painful urination, constipation)        No    Protocols used: POST-OP SYMPTOMS AND QUESTIONS-ADULT-      "

## 2021-10-18 NOTE — TELEPHONE ENCOUNTER
Patient called the nurse voicemail requesting Sofia to call her back to discuss some bleeding she is having since surgery on Thursday

## 2021-10-18 NOTE — TELEPHONE ENCOUNTER
Called pt back and pt having irregular light spotting. Surgery was on 10/14. Pt denies any fever or odor. Pt will continue to monitor.  Pt VU

## 2021-10-23 ENCOUNTER — OFFICE VISIT (OUTPATIENT)
Dept: URGENT CARE | Age: 21
End: 2021-10-23
Payer: COMMERCIAL

## 2021-10-23 ENCOUNTER — NURSE TRIAGE (OUTPATIENT)
Dept: CALL CENTER | Facility: HOSPITAL | Age: 21
End: 2021-10-23

## 2021-10-23 VITALS
TEMPERATURE: 98.1 F | OXYGEN SATURATION: 99 % | HEIGHT: 70 IN | SYSTOLIC BLOOD PRESSURE: 138 MMHG | HEART RATE: 67 BPM | DIASTOLIC BLOOD PRESSURE: 89 MMHG | BODY MASS INDEX: 37.05 KG/M2 | RESPIRATION RATE: 20 BRPM | WEIGHT: 258.8 LBS

## 2021-10-23 DIAGNOSIS — M79.641 PAIN OF RIGHT HAND: Primary | ICD-10-CM

## 2021-10-23 DIAGNOSIS — M25.441 SWELLING OF JOINT OF RIGHT HAND: ICD-10-CM

## 2021-10-23 PROCEDURE — 96372 THER/PROPH/DIAG INJ SC/IM: CPT | Performed by: PHYSICIAN ASSISTANT

## 2021-10-23 PROCEDURE — 99212 OFFICE O/P EST SF 10 MIN: CPT | Performed by: PHYSICIAN ASSISTANT

## 2021-10-23 RX ORDER — KETOROLAC TROMETHAMINE 30 MG/ML
60 INJECTION, SOLUTION INTRAMUSCULAR; INTRAVENOUS ONCE
Status: COMPLETED | OUTPATIENT
Start: 2021-10-23 | End: 2021-10-23

## 2021-10-23 RX ORDER — KETOROLAC TROMETHAMINE 10 MG/1
10 TABLET, FILM COATED ORAL EVERY 6 HOURS PRN
Qty: 20 TABLET | Refills: 0 | Status: SHIPPED | OUTPATIENT
Start: 2021-10-23 | End: 2021-10-27

## 2021-10-23 RX ADMIN — KETOROLAC TROMETHAMINE 60 MG: 30 INJECTION, SOLUTION INTRAMUSCULAR; INTRAVENOUS at 14:14

## 2021-10-23 ASSESSMENT — PATIENT HEALTH QUESTIONNAIRE - PHQ9
SUM OF ALL RESPONSES TO PHQ QUESTIONS 1-9: 0
SUM OF ALL RESPONSES TO PHQ9 QUESTIONS 1 & 2: 0
2. FEELING DOWN, DEPRESSED OR HOPELESS: 0
1. LITTLE INTEREST OR PLEASURE IN DOING THINGS: 0

## 2021-10-23 NOTE — TELEPHONE ENCOUNTER
"Caller states feeling like right hand thumb and index finger are cool compared with other digits. Caller sates maybe some pale but not blue. Caller c/o numbness of digits but states not all the way numb. Caller states pain seven or eight. Caller states she's noticed pain improves with heat some. Caller advised per guideline.     Reason for Disposition  • [1] SEVERE pain (e.g., excruciating) AND [2] not improved after 2 hours of pain medicine    Additional Information  • Negative: Followed a finger injury  • Negative: Wound looks infected  • Negative: Caused by an animal bite  • Negative: Caused by frostbite  • Negative: Caused by a human bite  • Negative: Hand or wrist pain is main symptom  • Negative: [1] Swollen joint AND [2] fever  • Negative: [1] Looks infected (spreading redness, red streak, pus) AND [2] fever  • Negative: [1] Looks infected (spreading redness, red streak, pus) AND [2] severe pain with movement  • Negative: Patient sounds very sick or weak to the triager  • Negative: [1] Looks infected (spreading redness, red streak, pus) AND [2] large red area (more than 2 in or 5 cm, or entire finger)    Answer Assessment - Initial Assessment Questions  1. ONSET: \"When did the pain start?\"       Thursday   2. LOCATION and RADIATION: \"Where is the pain located?\"  (e.g., fingertip, around nail, joint, entire   finger)       Thumb and index   3. SEVERITY: \"How bad is the pain?\" \"What does it keep you from doing?\"   (Scale 1-10; or mild, moderate, severe)   - MILD (1-3): doesn't interfere with normal activities.    - MODERATE (4-7): interferes with normal activities or awakens from sleep.   - SEVERE (8-10): excruciating pain, unable to hold a glass of water or bend finger even a little.      7 or 8   4. APPEARANCE: \"What does the finger look like?\" (e.g., redness, swelling, bruising, pallor)      Cool to the touch and less sensation   5. WORK OR EXERCISE: \"Has there been any recent work or exercise that involved " "this part of the body?\"      Denies   6. CAUSE: \"What do you think is causing the pain?\"      Not sure   7. AGGRAVATING FACTORS: \"What makes the pain worse?\" (e.g., using computer)      Heat will make better   8. OTHER SYMPTOMS: \"Do you have any other symptoms?\" (e.g., fever, neck pain, numbness)      Denies all   9. PREGNANCY: \"Is there any chance you are pregnant?\" \"When was your last menstrual period?\"    Protocols used: FINGER PAIN-ADULT-AH      "

## 2021-10-23 NOTE — PROGRESS NOTES
Subjective:      Patient ID: Yazmin Head is a 24 y.o. female. HPI  Ms. Jessie Chaudhari presents with pain in her right index finger and right thumb. She describes the pain as shooting. She states the pain shoots up her arm and into her shoulder. Her pain level now is at a 5, but she states it is worse at night when she is trying to sleep. She states heat does help. She has noticed swelling in the joints of her thumb and index finger. She denies any injury. Yazmin Head is a 24 y.o. female with the following history as recorded in [facility]:  Patient Active Problem List    Diagnosis Date Noted    Paratubal cyst     Irregular menses 06/15/2017     Current Outpatient Medications   Medication Sig Dispense Refill    ketorolac (TORADOL) 10 MG tablet Take 1 tablet by mouth every 6 hours as needed for Pain 20 tablet 0    LORazepam (ATIVAN) 0.5 MG tablet Take 0.5 mg by mouth every 6 hours as needed for Anxiety.  FLUoxetine (PROZAC) 40 MG capsule Take 40 mg by mouth daily      norethindrone-ethinyl estradiol (JUNEL 1/20) 1-20 MG-MCG per tablet TAKE 1 TABLET BY MOUTH EVERY DAY (Patient taking differently: Take 1 tablet by mouth daily TAKE 1 TABLET BY MOUTH EVERY DAY) 42 tablet 5    vitamin D (ERGOCALCIFEROL) 1.25 MG (60617 UT) CAPS capsule Take 1 capsule by mouth once a week for 8 doses 8 capsule 0    propranolol (INDERAL) 20 MG tablet Take 1 tablet by mouth 3 times daily as needed (panic attack) (Patient taking differently: Take 40 mg by mouth daily ) 20 tablet 1    levothyroxine (SYNTHROID) 75 MCG tablet Take 75 mcg by mouth Daily        No current facility-administered medications for this visit. Allergies: Patient has no known allergies.   Past Medical History:   Diagnosis Date    Anxiety     Fatty liver     Ovarian cyst      Past Surgical History:   Procedure Laterality Date    HYSTERECTOMY Right 10/14/2021    LAPAROSCOPIC RIGHT OVARIAN CYSTECTOMY WITH DAVINCI performed by [facility] at [facility]    WISDOM TOOTH EXTRACTION       Family History   Problem Relation Age of Onset    Other Mother         kidney stones    Thyroid Disease Mother     High Blood Pressure Mother     High Blood Pressure Father     Cancer Other         unknown, great maternal grandmother     Social History     Tobacco Use    Smoking status: Never Smoker    Smokeless tobacco: Never Used   Substance Use Topics    Alcohol use: No       Review of Systems   Musculoskeletal: Positive for arthralgias. All other systems reviewed and are negative. Objective:   Physical Exam  Vitals reviewed. Constitutional:       Appearance: Normal appearance. Musculoskeletal:      Right hand: Swelling present. No tenderness or bony tenderness. Normal sensation. Normal capillary refill. Hands:       Comments: Normal capillary refill. Normal sensation. No pain with active or passive motion. Skin:     General: Skin is warm and dry. Neurological:      General: No focal deficit present. Mental Status: She is alert and oriented to person, place, and time. Psychiatric:         Mood and Affect: Mood normal.         Behavior: Behavior normal.         Thought Content: Thought content normal.         Judgment: Judgment normal.         Assessment:      Pain and swelling of right hand      Plan:      She was given ketorolac in the clinic and a prescription of ketorolac for 5 days. I also recommended voltaren gel topically. I advised her to be seen at the [address] [place] [address] on Monday between the hours of 1-3. She also has an appt with her PCP and I advised her to call them on Monday to see if they want to see her as well.           Sharifa Llamas PA-C

## 2021-10-27 ENCOUNTER — OFFICE VISIT (OUTPATIENT)
Dept: OBGYN CLINIC | Age: 21
End: 2021-10-27

## 2021-10-27 VITALS
WEIGHT: 258 LBS | DIASTOLIC BLOOD PRESSURE: 78 MMHG | HEART RATE: 84 BPM | HEIGHT: 70 IN | SYSTOLIC BLOOD PRESSURE: 130 MMHG | BODY MASS INDEX: 36.94 KG/M2

## 2021-10-27 DIAGNOSIS — Z09 POSTOPERATIVE FOLLOW-UP: Primary | ICD-10-CM

## 2021-10-27 PROCEDURE — 99024 POSTOP FOLLOW-UP VISIT: CPT | Performed by: OBSTETRICS & GYNECOLOGY

## 2021-10-27 ASSESSMENT — ENCOUNTER SYMPTOMS
GASTROINTESTINAL NEGATIVE: 1
RESPIRATORY NEGATIVE: 1
EYES NEGATIVE: 1

## 2021-10-27 NOTE — PROGRESS NOTES
I, Sofia Deutsch RN, am scribing for and in the presence of Dr. Rsoe Godoy 10/27/2021/10:18 AM/sign         10/27/2021    Karla Ha (:  2000) is a 24 y.o. female, here for a post op check up. Post-Operative Follow-up: Patient here for post-op follow-up. Patient is 2 weeks status post Laparoscopic paraovarian cystectomy with DaVinci. The patient reports no problems with eating, bowel movements, voiding, or their wound. The patient is not having any pain. Pt states the pain the she was having prior to surgery has resolved. Patient Active Problem List   Diagnosis    Irregular menses    Paratubal cyst       Review of Systems   Constitutional: Negative. HENT: Negative. Eyes: Negative. Respiratory: Negative. Cardiovascular: Negative. Gastrointestinal: Negative. Genitourinary: Negative for difficulty urinating, dyspareunia, dysuria, enuresis, frequency, hematuria, menstrual problem, pelvic pain, urgency and vaginal discharge. Musculoskeletal: Negative. Skin: Negative. Neurological: Negative. Psychiatric/Behavioral: Negative. Prior to Visit Medications    Medication Sig Taking? Authorizing Provider   LORazepam (ATIVAN) 0.5 MG tablet Take 0.5 mg by mouth every 6 hours as needed for Anxiety.  Yes Historical Provider, MD   FLUoxetine (PROZAC) 40 MG capsule Take 40 mg by mouth daily Yes Historical Provider, MD   norethindrone-ethinyl estradiol (JUNEL 1/20) 1-20 MG-MCG per tablet TAKE 1 [facility]  Patient taking differently: Take 1 tablet by mouth daily TAKE 1 TABLET BY MOUTH EVERY DAY Yes Robert Flores MD   levothyroxine (SYNTHROID) 75 MCG tablet Take 75 mcg by mouth Daily  Yes Historical Provider, MD   vitamin D (ERGOCALCIFEROL) 1.25 MG (90662 UT) CAPS capsule Take 1 capsule by mouth once a week for 8 doses  Robert Flores MD   propranolol (INDERAL) 20 MG tablet Take 1 tablet by mouth 3 times daily as needed (panic attack)  Patient taking Disease Mother     High Blood Pressure Mother     High Blood Pressure Father     Cancer Other         unknown, great maternal grandmother       ADVANCE DIRECTIVE: N, <no information>    Vitals:    10/27/21 0933   BP: 130/78   Site: Left Upper Arm   Position: Sitting   Cuff Size: Large Adult   Pulse: 84   Weight: 258 lb (117 kg)   Height: 5' 10\" (1.778 m)     Estimated body mass index is 37.02 kg/m² as calculated from the following:    Height as of this encounter: 5' 10\" (1.778 m). Weight as of this encounter: 258 lb (117 kg). Physical Exam  Vitals and nursing note reviewed. Constitutional:       General: She is not in acute distress. Appearance: She is well-developed. She is not diaphoretic. HENT:      Head: Normocephalic and atraumatic. Eyes:      Conjunctiva/sclera: Conjunctivae normal.      Pupils: Pupils are equal, round, and reactive to light. Pulmonary:      Effort: Pulmonary effort is normal.   Abdominal:      Tenderness: There is no guarding. Comments: Incisions healing well   Musculoskeletal:         General: Normal range of motion. Cervical back: Normal range of motion. Comments: Normal ROM in all 4 extremities; normal gait   Skin:     General: Skin is warm and dry. Neurological:      Mental Status: She is alert and oriented to person, place, and time. Motor: No abnormal muscle tone. Coordination: Coordination normal.   Psychiatric:         Behavior: Behavior normal.         No flowsheet data found. Lab Results   Component Value Date    GLUCOSE 95 12/30/2020    LABA1C 5.3 12/30/2020       The ASCVD Risk score (Brian Chinchilla., et al., 2013) failed to calculate for the following reasons:     The 2013 ASCVD risk score is only valid for ages 36 to 78    Immunization History   Administered Date(s) Administered    COVID-19, Pfizer, PF, 30mcg/0.3mL 09/14/2021, 10/05/2021       Health Maintenance   Topic Date Due    Hepatitis C screen  Never done    Varicella vaccine (1 of 2 - 2-dose childhood series) Never done    HPV vaccine (1 - 2-dose series) Never done    HIV screen  Never done    Chlamydia screen  Never done    DTaP/Tdap/Td vaccine (1 - Tdap) Never done    Flu vaccine (1) Never done    Pap smear  03/05/2024    COVID-19 Vaccine  Completed    Hepatitis A vaccine  Aged Out    Hepatitis B vaccine  Aged Out    Hib vaccine  Aged Out    Meningococcal (ACWY) vaccine  Aged Out    Pneumococcal 0-64 years Vaccine  Aged Out          ASSESSMENT/PLAN:  1. Postoperative follow-up    Counseling was offered and accepted by patient. The operative report and surgical findings were discussed with the patient. Pt will continue OCP. She was instructed to gradually resume normal activity. She was also instructed to call or return to the office if complications occur. Patient voiced understanding of above. Return if symptoms worsen or fail to improve. An electronic signature was used to authenticate this note. Leann Guzman MD, personally performed services described in this document as scribed by Arlin Chapman RN in my presence, and it is both accurate and complete.

## 2021-11-14 ENCOUNTER — NURSE TRIAGE (OUTPATIENT)
Dept: CALL CENTER | Facility: HOSPITAL | Age: 21
End: 2021-11-14

## 2021-11-14 NOTE — TELEPHONE ENCOUNTER
"She awoke yesterday and both eyes are burning and the  eyes have blisters under the eye lashes. They are the size of a tack, they are fluid filled, it includes both eyes. She works midnights at  [employer].     Reason for Disposition  • [1] Caller requests to speak ONLY to PCP AND [2] URGENT question    Additional Information  • Negative: Lab calling with strep throat test results and triager can call in prescription  • Negative: Lab calling with urinalysis test results and triager can call in prescription  • Negative: Medication questions  • Negative: ED call to PCP  • Negative: Physician call to PCP  • Negative: Call about patient who is currently hospitalized  • Negative: Lab or radiology calling with CRITICAL test results  • Negative: [1] Prescription not at pharmacy AND [2] was prescribed today by PCP  • Negative: [1] Follow-up call from patient regarding patient's clinical status AND [2] information urgent    Answer Assessment - Initial Assessment Questions  1. REASON FOR CALL or QUESTION: \"What is your reason for calling today?\" or \"How can I best  help you?\" or \"What question do you have that I can help answer?\"      See note   2. CALLER: Document the source of call. (e.g., laboratory, patient).      See note    Protocols used: PCP CALL - NO TRIAGE-ADULT-      "

## 2022-01-11 ENCOUNTER — TRANSCRIBE ORDERS (OUTPATIENT)
Dept: ADMINISTRATIVE | Facility: HOSPITAL | Age: 22
End: 2022-01-11

## 2022-01-11 DIAGNOSIS — Z11.59 ENCOUNTER FOR SCREENING FOR VIRAL DISEASE: Primary | ICD-10-CM

## 2022-01-13 ENCOUNTER — HOSPITAL ENCOUNTER (OUTPATIENT)
Dept: GENERAL RADIOLOGY | Facility: HOSPITAL | Age: 22
Discharge: HOME | End: 2022-01-13
Admitting: INTERNAL MEDICINE

## 2022-01-13 DIAGNOSIS — Z11.59 ENCOUNTER FOR SCREENING FOR VIRAL DISEASE: ICD-10-CM

## 2022-01-13 PROCEDURE — 71046 X-RAY EXAM CHEST 2 VIEWS: CPT

## 2022-03-14 ENCOUNTER — OFFICE VISIT (OUTPATIENT)
Dept: OBGYN CLINIC | Age: 22
End: 2022-03-14
Payer: COMMERCIAL

## 2022-03-14 VITALS
BODY MASS INDEX: 38.08 KG/M2 | DIASTOLIC BLOOD PRESSURE: 90 MMHG | HEIGHT: 70 IN | WEIGHT: 266 LBS | SYSTOLIC BLOOD PRESSURE: 140 MMHG | HEART RATE: 96 BPM

## 2022-03-14 DIAGNOSIS — Z12.4 SCREENING FOR CERVICAL CANCER: ICD-10-CM

## 2022-03-14 DIAGNOSIS — Z12.39 SCREENING BREAST EXAMINATION: ICD-10-CM

## 2022-03-14 DIAGNOSIS — Z30.45 ENCOUNTER FOR SURVEILLANCE OF TRANSDERMAL PATCH HORMONAL CONTRACEPTIVE DEVICE: ICD-10-CM

## 2022-03-14 DIAGNOSIS — Z01.419 ENCOUNTER FOR GYNECOLOGICAL EXAMINATION WITHOUT ABNORMAL FINDING: Primary | ICD-10-CM

## 2022-03-14 PROCEDURE — 99395 PREV VISIT EST AGE 18-39: CPT | Performed by: NURSE PRACTITIONER

## 2022-03-14 ASSESSMENT — ENCOUNTER SYMPTOMS
EYES NEGATIVE: 1
ALLERGIC/IMMUNOLOGIC NEGATIVE: 1
RESPIRATORY NEGATIVE: 1
DIARRHEA: 0
GASTROINTESTINAL NEGATIVE: 1
CONSTIPATION: 0

## 2022-03-14 NOTE — PROGRESS NOTES
Pt presents today for pap smear and breast exam.  She states her b/p is high due to her car breaking down and she has not taken her b/p medicine today.      Mammo:NA   Pap smear:  Contraception:hysterectomy     P:0  Ab:0  Bone density:NA   Colonoscopy:NA

## 2022-03-14 NOTE — PROGRESS NOTES
Jose J Gabriel is a 24 y.o. female who presents today for her medical conditions/ complaints as noted below. Jose J Gabriel is c/o of Gynecologic Exam        HPI   Pt presents for annual exam and pap smear. Using patches and doing well. No recent issues with cysts. BP elevated- pt states she did not take her medication today and her car broke down. Feeling more anxious. Mammo:NA   Pap smear:  Contraception: patches    P:0  Ab:0  Bone density:NA   Colonoscopy:NA  Patient's last menstrual period was 2022 (exact date).     Past Medical History:   Diagnosis Date    Anxiety     Fatty liver     Ovarian cyst      Past Surgical History:   Procedure Laterality Date    HYSTERECTOMY Right 10/14/2021    LAPAROSCOPIC RIGHT OVARIAN CYSTECTOMY WITH Bulspencer Back performed by Jamal Dahl MD at [place] [address] EXTRACTION       Family History   Problem Relation Age of Onset    Other Mother         kidney stones    Thyroid Disease Mother     High Blood Pressure Mother     High Blood Pressure Father     Cancer Other         unknown, great maternal grandmother     Social History     Tobacco Use    Smoking status: Never Smoker    Smokeless tobacco: Never Used   Substance Use Topics    Alcohol use: No       Current Outpatient Medications   Medication Sig Dispense Refill    norelgestromin-ethinyl estradiol (ORTHO EVRA) 150-35 MCG/24HR Place 1 patch onto the skin once a week 3 patch 11    LORazepam (ATIVAN) 0.5 MG tablet Take 0.5 mg by mouth every 6 hours as needed for Anxiety.       FLUoxetine (PROZAC) 40 MG capsule Take 40 mg by mouth daily      vitamin D (ERGOCALCIFEROL) 1.25 MG (46400 UT) CAPS capsule Take 1 capsule by mouth once a week for 8 doses 8 capsule 0    propranolol (INDERAL) 20 MG tablet Take 1 tablet by mouth 3 times daily as needed (panic attack) (Patient taking differently: Take 40 mg by mouth daily ) 20 tablet 1    levothyroxine (SYNTHROID) 75 MCG tablet Take 75 mcg by mouth Daily        No current facility-administered medications for this visit. No Known Allergies  Vitals:    03/14/22 1509   BP: (!) 152/112   Pulse: 96     Body mass index is 38.17 kg/m². Review of Systems   Constitutional: Negative. HENT: Negative. Eyes: Negative. Respiratory: Negative. Cardiovascular: Negative. Gastrointestinal: Negative. Negative for constipation and diarrhea. Endocrine: Negative. Genitourinary: Negative. Negative for frequency, menstrual problem and urgency. Musculoskeletal: Negative. Skin: Negative. Allergic/Immunologic: Negative. Neurological: Negative. Hematological: Negative. Psychiatric/Behavioral: Negative. All other systems reviewed and are negative. Physical Exam  Vitals and nursing note reviewed. Constitutional:       Appearance: She is well-developed. HENT:      Head: Normocephalic. Neck:      Thyroid: No thyroid mass or thyromegaly. Cardiovascular:      Rate and Rhythm: Normal rate and regular rhythm. Pulmonary:      Effort: Pulmonary effort is normal.      Breath sounds: Normal breath sounds. Chest:   Breasts:      Right: No inverted nipple, mass, nipple discharge or skin change. Left: No inverted nipple, mass, nipple discharge or skin change. Abdominal:      Palpations: Abdomen is soft. There is no mass. Tenderness: There is no abdominal tenderness. Genitourinary:     General: Normal vulva. Vagina: Normal.      Cervix: No cervical motion tenderness. Uterus: Normal. Not enlarged. Adnexa:         Right: No mass or tenderness. Left: No mass or tenderness. Comments: Pap collected  Small amount of old menstrual blood noted    Musculoskeletal:         General: Normal range of motion. Cervical back: Normal range of motion and neck supple. Skin:     General: Skin is warm and dry.    Neurological:      Mental Status: She is alert and oriented to person, place, and time.   Psychiatric:         Attention and Perception: Attention normal.         Mood and Affect: Mood normal.         Speech: Speech normal.         Behavior: Behavior normal.         Thought Content: Thought content normal.         Cognition and Memory: Cognition normal.         Judgment: Judgment normal.          Diagnosis Orders   1. Encounter for gynecological examination without abnormal finding     2. Screening for cervical cancer     3. Screening breast examination     4. Encounter for surveillance of transdermal patch hormonal contraceptive device  norelgestromin-ethinyl estradiol (ORTHO EVRA) 150-35 MCG/24HR       MEDICATIONS:  Orders Placed This Encounter   Medications    norelgestromin-ethinyl estradiol (ORTHO EVRA) 150-35 MCG/24HR     Sig: Place 1 patch onto the skin once a week     Dispense:  3 patch     Refill:  11       ORDERS:  No orders of the defined types were placed in this encounter. PLAN:  Pap collected  Refilled patches  Recheck BP improved, instructed to monitor at home while taking medication and f/u if still elevated- pt states understanding    Patient Instructions   Patient Education        Breast Self-Exam: Care Instructions  Your Care Instructions     A breast self-exam is when you check your breasts for lumps or changes. This regular exam helps you learn how your breasts normally look and feel. Most breast problems or changes are not because of cancer. Breast self-exam is not a substitute for a mammogram. Having regular breast exams by your doctor and regular mammograms improve your chances of finding any problems with your breasts. Some women set a time each month to do a step-by-step breast self-exam. Other women like a less formal system. They might look at their breasts as they brush their teeth, or feel their breasts once in a while in the shower. If you notice a change in your breast, tell your doctor. Follow-up care is a key part of your treatment and safety.  Be sure to make and go to all appointments, and call your doctor if you are having problems. It's also a good idea to know your test results and keep a list of the medicines you take. How do you do a breast self-exam?  · The best time to examine your breasts is usually one week after your menstrual period begins. Your breasts should not be tender then. If you do not have periods, you might do your exam on a day of the month that is easy to remember. · To examine your breasts:  ? Remove all your clothes above the waist and lie down. When you are lying down, your breast tissue spreads evenly over your chest wall, which makes it easier to feel all your breast tissue. ? Use the pads--not the fingertips--of the 3 middle fingers of your left hand to check your right breast. Move your fingers slowly in small coin-sized circles that overlap. ? Use three levels of pressure to feel of all your breast tissue. Use light pressure to feel the tissue close to the skin surface. Use medium pressure to feel a little deeper. Use firm pressure to feel your tissue close to your breastbone and ribs. Use each pressure level to feel your breast tissue before moving on to the next spot. ? Check your entire breast, moving up and down as if following a strip from the collarbone to the bra line, and from the armpit to the ribs. Repeat until you have covered the entire breast.  ? Repeat this procedure for your left breast, using the pads of the 3 middle fingers of your right hand. · To examine your breasts while in the shower:  ? Place one arm over your head and lightly soap your breast on that side. ? Using the pads of your fingers, gently move your hand over your breast (in the strip pattern described above), feeling carefully for any lumps or changes. ? Repeat for the other breast.  · Have your doctor inspect anything you notice to see if you need further testing. Where can you learn more? Go to https://anthony.healthVuCOMP. org and sign in to your [employer] account. Enter P148 in the Qlibri box to learn more about \"Breast Self-Exam: Care Instructions. \"     If you do not have an account, please click on the \"Sign Up Now\" link. Current as of: September 8, 2021               Content Version: 13.1  © 3495-6329 Healthwise, Incorporated. Care instructions adapted under license by [facility] ([facility]). If you have questions about a medical condition or this instruction, always ask your healthcare professional. [address] any warranty or liability for your use of this information.

## 2022-03-14 NOTE — PATIENT INSTRUCTIONS
Patient Education        Breast Self-Exam: Care Instructions  Your Care Instructions     A breast self-exam is when you check your breasts for lumps or changes. This regular exam helps you learn how your breasts normally look and feel. Most breast problems or changes are not because of cancer. Breast self-exam is not a substitute for a mammogram. Having regular breast exams by your doctor and regular mammograms improve your chances of finding any problems with your breasts. Some women set a time each month to do a step-by-step breast self-exam. Other women like a less formal system. They might look at their breasts as they brush their teeth, or feel their breasts once in a while in the shower. If you notice a change in your breast, tell your doctor. Follow-up care is a key part of your treatment and safety. Be sure to make and go to all appointments, and call your doctor if you are having problems. It's also a good idea to know your test results and keep a list of the medicines you take. How do you do a breast self-exam?  · The best time to examine your breasts is usually one week after your menstrual period begins. Your breasts should not be tender then. If you do not have periods, you might do your exam on a day of the month that is easy to remember. · To examine your breasts:  ? Remove all your clothes above the waist and lie down. When you are lying down, your breast tissue spreads evenly over your chest wall, which makes it easier to feel all your breast tissue. ? Use the pads--not the fingertips--of the 3 middle fingers of your left hand to check your right breast. Move your fingers slowly in small coin-sized circles that overlap. ? Use three levels of pressure to feel of all your breast tissue. Use light pressure to feel the tissue close to the skin surface. Use medium pressure to feel a little deeper. Use firm pressure to feel your tissue close to your breastbone and ribs.  Use each pressure level to feel your breast tissue before moving on to the next spot. ? Check your entire breast, moving up and down as if following a strip from the collarbone to the bra line, and from the armpit to the ribs. Repeat until you have covered the entire breast.  ? Repeat this procedure for your left breast, using the pads of the 3 middle fingers of your right hand. · To examine your breasts while in the shower:  ? Place one arm over your head and lightly soap your breast on that side. ? Using the pads of your fingers, gently move your hand over your breast (in the strip pattern described above), feeling carefully for any lumps or changes. ? Repeat for the other breast.  · Have your doctor inspect anything you notice to see if you need further testing. Where can you learn more? Go to https://chshaunnaeb.Pet Ready. org and sign in to your [employer] account. Enter P148 in the Gift Card Impressions box to learn more about \"Breast Self-Exam: Care Instructions. \"     If you do not have an account, please click on the \"Sign Up Now\" link. Current as of: September 8, 2021               Content Version: 13.1  © 9597-0859 [employer]. Care instructions adapted under license by [facility] ([facility]). If you have questions about a medical condition or this instruction, always ask your healthcare professional. [place] [address] any warranty or liability for your use of this information. Patient Education        Well Visit, Ages 25 to 48: Care Instructions  Overview     Well visits can help you stay healthy. Your doctor has checked your overall health and may have suggested ways to take good care of yourself. Your doctor also may have recommended tests. At home, you can help prevent illness with healthy eating, regular exercise, and other steps. Follow-up care is a key part of your treatment and safety. Be sure to make and go to all appointments, and call your doctor if you are having problems.  It's also a good idea to know your test results and keep a list of the medicines you take. How can you care for yourself at home? · Get screening tests that you and your doctor decide on. Screening helps find diseases before any symptoms appear. · Eat healthy foods. Choose fruits, vegetables, whole grains, protein, and low-fat dairy foods. Limit fat, especially saturated fat. Reduce salt in your diet. · Limit alcohol. If you are a man, have no more than 2 drinks a day or 14 drinks a week. If you are a woman, have no more than 1 drink a day or 7 drinks a week. · Get at least 30 minutes of physical activity on most days of the week. Walking is a good choice. You also may want to do other activities, such as running, swimming, cycling, or playing tennis or team sports. Discuss any changes in your exercise program with your doctor. · Reach and stay at a healthy weight. This will lower your risk for many problems, such as obesity, diabetes, heart disease, and high blood pressure. · Do not smoke or allow others to smoke around you. If you need help quitting, talk to your doctor about stop-smoking programs and medicines. These can increase your chances of quitting for good. · Care for your mental health. It is easy to get weighed down by worry and stress. Learn strategies to manage stress, like deep breathing and mindfulness, and stay connected with your family and community. If you find you often feel sad or hopeless, talk with your doctor. Treatment can help. · Talk to your doctor about whether you have any risk factors for sexually transmitted infections (STIs). You can help prevent STIs if you wait to have sex with a new partner (or partners) until you've each been tested for STIs. It also helps if you use condoms (male or female condoms) and if you limit your sex partners to one person who only has sex with you. Vaccines are available for some STIs, such as HPV.   · Use birth control if it's important to you to prevent pregnancy. Talk with your doctor about the choices available and what might be best for you. · If you think you may have a problem with alcohol or drug use, talk to your doctor. This includes prescription medicines (such as amphetamines and opioids) and illegal drugs (such as cocaine and methamphetamine). Your doctor can help you figure out what type of treatment is best for you. · Protect your skin from too much sun. When you're outdoors from 10 a.m. to 4 p.m., stay in the shade or cover up with clothing and a hat with a wide brim. Wear sunglasses that block UV rays. Even when it's cloudy, put broad-spectrum sunscreen (SPF 30 or higher) on any exposed skin. · See a dentist one or two times a year for checkups and to have your teeth cleaned. · Wear a seat belt in the car. When should you call for help? Watch closely for changes in your health, and be sure to contact your doctor if you have any problems or symptoms that concern you. Where can you learn more? Go to https://Netlift.Inside Warehouse. org and sign in to your Placester account. Enter P072 in the KylesBig Frame box to learn more about \"Well Visit, Ages 25 to 48: Care Instructions. \"     If you do not have an account, please click on the \"Sign Up Now\" link. Current as of: October 6, 2021               Content Version: 13.1  © 1936-9745 Healthwise, Incorporated. Care instructions adapted under license by [facility] ([facility]). If you have questions about a medical condition or this instruction, always ask your healthcare professional. [place] [address] any warranty or liability for your use of this information.

## 2022-06-17 ENCOUNTER — NURSE TRIAGE (OUTPATIENT)
Dept: CALL CENTER | Facility: HOSPITAL | Age: 22
End: 2022-06-17

## 2022-06-17 NOTE — TELEPHONE ENCOUNTER
"    Reason for Disposition  • Non-serious spider bite    Additional Information  • Negative: Difficulty breathing or swallowing  • Negative: Shock suspected (e.g., cold/pale/clammy skin, too weak to stand, low BP, rapid pulse)  • Negative: Difficult to awaken or acting confused (e.g., disoriented, slurred speech)  • Negative: Sounds like a life-threatening emergency to the triager  • Negative: Boil suspected (i.e., painful red lump and NO spider bite)  • Negative: Not a spider bite  • Negative: [1] Black  (or brown ) spider bite AND [2] local skin changes  • Negative: Abdominal pain, chest tightness or other muscle cramps  • Negative: Urine is brown, black or red in color  • Negative: Vomiting  • Negative: [1] Rash elsewhere on body AND [2] developed after spider bite  • Negative: Patient sounds very sick or weak to the triager  • Negative: [1] SEVERE bite pain AND [2] not improved after 2 hours of pain medicine  • Negative: [1] Fever AND [2] red area  • Negative: [1] Fever AND [2] area is very tender to touch  • Negative: [1] Red streak or red line AND [2] length > 2 inches (5 cm)  • Negative: [1] Red or very tender (to touch) area AND [2] started over 24 hours after the bite  • Negative: [1] Red or very tender (to touch) area AND [2] getting larger over 48 hours after the bite  • Negative: Eye irritation after handling or touching a tarantula  • Negative: No prior tetanus shots (or is not fully vaccinated)  • Negative: Bite starts to look bad (e.g., blister, purplish skin, ulcer) (Exception:  just swelling or small red bump)  • Negative: [1] Scab is present AND [2] it drains pus or increases in size AND [3] not improved after applying  antibiotic ointment for 2 days  • Negative: [1] Diabetes mellitus AND [2] spider bite wound on foot  • Negative: Last tetanus shot > 10 years ago    Answer Assessment - Initial Assessment Questions  1. TYPE of SPIDER: \"What type of spider was it?\"  (e.g., name, unknown, or " "brief description)      unknown  2. LOCATION: \"Where is the bite located?\"       Left leg near ankle  3. PAIN: \"Is there any pain?\" If Yes, ask: \"How bad is it?\"  (Scale 1-10; or mild, moderate, severe)      denies  4. SWELLING: \"How big is the swelling?\" (Inches, cm or compare to coins)       denies  5. ONSET: \"When did the bite occur?\" (Minutes or hours ago)       Wednesday  6. TETANUS: \"When was the last tetanus booster?\"       2017  7. OTHER SYMPTOMS: \"Do you have any other symptoms?\"  (e.g., muscle cramps, abdominal pain, change in urine color)      Itching, rash    Protocols used: SPIDER BITE - [place]-ADULT-      "

## 2022-08-18 ENCOUNTER — LAB (OUTPATIENT)
Dept: LAB | Facility: HOSPITAL | Age: 22
End: 2022-08-18

## 2022-08-18 ENCOUNTER — TRANSCRIBE ORDERS (OUTPATIENT)
Dept: ADMINISTRATIVE | Facility: HOSPITAL | Age: 22
End: 2022-08-18

## 2022-08-18 DIAGNOSIS — I51.9 MYXEDEMA HEART DISEASE: Primary | ICD-10-CM

## 2022-08-18 DIAGNOSIS — I10 ESSENTIAL HYPERTENSION, MALIGNANT: ICD-10-CM

## 2022-08-18 DIAGNOSIS — I51.9 MYXEDEMA HEART DISEASE: ICD-10-CM

## 2022-08-18 DIAGNOSIS — Z00.00 ROUTINE GENERAL MEDICAL EXAMINATION AT A HEALTH CARE FACILITY: ICD-10-CM

## 2022-08-18 DIAGNOSIS — E03.9 MYXEDEMA HEART DISEASE: ICD-10-CM

## 2022-08-18 DIAGNOSIS — E03.9 MYXEDEMA HEART DISEASE: Primary | ICD-10-CM

## 2022-08-18 LAB
ALBUMIN SERPL-MCNC: 4.3 G/DL (ref 3.5–5.2)
ALBUMIN/GLOB SERPL: 1.5 G/DL
ALP SERPL-CCNC: 58 U/L (ref 39–117)
ALT SERPL W P-5'-P-CCNC: 191 U/L (ref 1–33)
ANION GAP SERPL CALCULATED.3IONS-SCNC: 9 MMOL/L (ref 5–15)
AST SERPL-CCNC: 120 U/L (ref 1–32)
BILIRUB SERPL-MCNC: 0.3 MG/DL (ref 0–1.2)
BUN SERPL-MCNC: 7 MG/DL (ref 6–20)
BUN/CREAT SERPL: 13.5 (ref 7–25)
CALCIUM SPEC-SCNC: 9.7 MG/DL (ref 8.6–10.5)
CHLORIDE SERPL-SCNC: 104 MMOL/L (ref 98–107)
CO2 SERPL-SCNC: 26 MMOL/L (ref 22–29)
CREAT SERPL-MCNC: 0.52 MG/DL (ref 0.57–1)
DEPRECATED RDW RBC AUTO: 40.2 FL (ref 37–54)
EGFRCR SERPLBLD CKD-EPI 2021: 134.1 ML/MIN/1.73
ERYTHROCYTE [DISTWIDTH] IN BLOOD BY AUTOMATED COUNT: 12.2 % (ref 12.3–15.4)
GLOBULIN UR ELPH-MCNC: 2.9 GM/DL
GLUCOSE SERPL-MCNC: 111 MG/DL (ref 65–99)
HCT VFR BLD AUTO: 39.4 % (ref 34–46.6)
HGB BLD-MCNC: 13.3 G/DL (ref 12–15.9)
MCH RBC QN AUTO: 30.4 PG (ref 26.6–33)
MCHC RBC AUTO-ENTMCNC: 33.8 G/DL (ref 31.5–35.7)
MCV RBC AUTO: 90.2 FL (ref 79–97)
PLATELET # BLD AUTO: 253 10*3/MM3 (ref 140–450)
PMV BLD AUTO: 11.1 FL (ref 6–12)
POTASSIUM SERPL-SCNC: 3.9 MMOL/L (ref 3.5–5.2)
PROT SERPL-MCNC: 7.2 G/DL (ref 6–8.5)
RBC # BLD AUTO: 4.37 10*6/MM3 (ref 3.77–5.28)
SODIUM SERPL-SCNC: 139 MMOL/L (ref 136–145)
WBC NRBC COR # BLD: 7.85 10*3/MM3 (ref 3.4–10.8)

## 2022-08-18 PROCEDURE — 84439 ASSAY OF FREE THYROXINE: CPT

## 2022-08-18 PROCEDURE — 80050 GENERAL HEALTH PANEL: CPT

## 2022-08-18 PROCEDURE — 36415 COLL VENOUS BLD VENIPUNCTURE: CPT

## 2022-08-18 PROCEDURE — 80061 LIPID PANEL: CPT

## 2022-08-18 PROCEDURE — 84481 FREE ASSAY (FT-3): CPT

## 2022-08-19 LAB
CHOLEST SERPL-MCNC: 195 MG/DL (ref 0–200)
HDLC SERPL-MCNC: 49 MG/DL (ref 40–60)
LDLC SERPL CALC-MCNC: 113 MG/DL (ref 0–100)
LDLC/HDLC SERPL: 2.21 {RATIO}
T3FREE SERPL-MCNC: 2.96 PG/ML (ref 2–4.4)
T4 FREE SERPL-MCNC: 1.26 NG/DL (ref 0.93–1.7)
TRIGL SERPL-MCNC: 189 MG/DL (ref 0–150)
TSH SERPL DL<=0.05 MIU/L-ACNC: 3.32 UIU/ML (ref 0.27–4.2)
VLDLC SERPL-MCNC: 33 MG/DL (ref 5–40)

## 2022-12-19 DIAGNOSIS — Z30.45 ENCOUNTER FOR SURVEILLANCE OF TRANSDERMAL PATCH HORMONAL CONTRACEPTIVE DEVICE: ICD-10-CM

## 2023-04-11 ENCOUNTER — OFFICE VISIT (OUTPATIENT)
Dept: OBSTETRICS AND GYNECOLOGY | Facility: CLINIC | Age: 23
End: 2023-04-11
Payer: COMMERCIAL

## 2023-04-11 VITALS
SYSTOLIC BLOOD PRESSURE: 128 MMHG | HEIGHT: 70 IN | BODY MASS INDEX: 37.8 KG/M2 | DIASTOLIC BLOOD PRESSURE: 82 MMHG | WEIGHT: 264 LBS

## 2023-04-11 DIAGNOSIS — Z30.45 ENCOUNTER FOR SURVEILLANCE OF TRANSDERMAL PATCH HORMONAL CONTRACEPTIVE DEVICE: Primary | ICD-10-CM

## 2023-04-11 DIAGNOSIS — Z98.890 HISTORY OF REMOVAL OF OVARIAN CYST: ICD-10-CM

## 2023-04-11 DIAGNOSIS — Z01.419 ENCOUNTER FOR ANNUAL ROUTINE GYNECOLOGICAL EXAMINATION: ICD-10-CM

## 2023-04-11 DIAGNOSIS — Z11.3 SCREENING EXAMINATION FOR VENEREAL DISEASE: ICD-10-CM

## 2023-04-11 DIAGNOSIS — Z87.42 HISTORY OF REMOVAL OF OVARIAN CYST: ICD-10-CM

## 2023-04-11 RX ORDER — FLUOXETINE HYDROCHLORIDE 40 MG/1
40 CAPSULE ORAL
COMMUNITY

## 2023-04-11 RX ORDER — BUPROPION HYDROCHLORIDE 150 MG/1
TABLET ORAL
COMMUNITY
Start: 2023-04-05

## 2023-04-11 RX ORDER — LORAZEPAM 0.5 MG/1
0.5 TABLET ORAL
COMMUNITY

## 2023-04-11 RX ORDER — NORELGESTROMIN AND ETHINLY ESTRADIOL 150; 35 UG/D; UG/D
PATCH TRANSDERMAL
COMMUNITY
Start: 2023-04-05

## 2023-04-11 RX ORDER — PROPRANOLOL HYDROCHLORIDE 40 MG/1
TABLET ORAL
COMMUNITY
Start: 2023-04-05

## 2023-04-11 RX ORDER — FLUOXETINE HYDROCHLORIDE 40 MG/1
CAPSULE ORAL
COMMUNITY
Start: 2023-04-05

## 2023-04-11 NOTE — PROGRESS NOTES
Cortes Mejia MD  [facility]  [address]  [place] [address]  Office [phone]  Fax [phone]      [facility]  Macy Hernandez  : 2000  MRN: [id number]    Subjective   Subjective     Chief Complaint   Patient presents with   • Gynecologic Exam     Patient here to establish care and get annual exam. Patient last pap 2022- NILM. Patient denies other questions or concerns       History of Present Illness  Macy Hernandez is a 23 y.o. female , , who comes to the office today for annual exam. No complaints. No breast complaints. No pelvic or menstrual complaints. Happy with patch. Not sexually active and has never been.      Review of Systems   Genitourinary: Negative for decreased urine volume, difficulty urinating, dyspareunia, dysuria, enuresis, flank pain, frequency, genital sores, hematuria, menstrual problem, pelvic pain, urgency, vaginal bleeding, vaginal discharge and vaginal pain.   All other systems reviewed and are negative.       OB hx:  OB History    Para Term  AB Living   0 0 0 0 0 0   SAB IAB Ectopic Molar Multiple Live Births   0 0 0 0 0 0        GYN hx:  Date of LMP: Patient's last menstrual period was 2023 (exact date).  Age at menarche: 12-13    Menopause: n/a  Menses: monthly, 28 days  Flow: 4-5 days  Date/Result of last Pap smear: was done on approximately  and the result was: normal PAP.   History of abnormal PAP: No  Date/Result of last mammogram: she has never had a mammogram  History of Abnormal Mammogram: No  Date/Result of last colonoscopy: has not had colonoscopy.  This was recommended.  History of Abnormal colonoscopy: No  Date/Result of last DEXA: None  History of Abnormal DEXA: No  HPV Vaccination: Yes. Details: 3 doses  History of Cervical Dysplasia: No  History of Vulvar Dysplasia: No  History of Sexually Transmitted Infection: No  Current Birth Control Method: patch  History of Contraception: Yes. Details:   -h/o  "OCPs  HRT: No  Sexually active: No   FMH of Breast, Uterine, Ovarian, or Colon cancer: Yes. Details:   -maternal great-grandmother ovarian cancer  Additional OB/GYN History (not otherwise listed):  -2021 - laparoscopic right ovarian cystectomy     Personal History     The following portions of the patient's history were reviewed and updated as appropriate: allergies, current medications, past family history, past medical history, past social history, past surgical history and problem list.    History Review Reviewed Comments   Past Medical History:  [x]     Past Surgical History: [x]     Family History: [x]     Social History: [x]       Current Outpatient Medications   Medication Instructions   • buPROPion XL (WELLBUTRIN XL) 150 MG 24 hr tablet No dose, route, or frequency recorded.   • FLUoxetine (PROzac) 40 MG capsule No dose, route, or frequency recorded.   • FLUoxetine (PROZAC) 20 mg, Oral, Daily   • FLUoxetine (PROZAC) 40 mg, Oral   • levothyroxine (SYNTHROID, LEVOTHROID) 50 mcg, Oral, Daily   • LORazepam (ATIVAN) 0.5 mg, Oral   • norelgestromin-ethinyl estradiol (ORTHO EVRA) 150-35 MCG/24HR 1 patch, Transdermal, Weekly   • ondansetron ODT (ZOFRAN-ODT) 8 MG disintegrating tablet Place 1 tablet on the tongue and allow to dissolve as needed three times daily for 10 days   • propranolol (INDERAL) 40 MG tablet No dose, route, or frequency recorded.   • propranolol (INDERAL) 40 mg, Oral, Daily   • Wegovy 0.5 mg, Subcutaneous, Weekly   • Zafemy 150-35 MCG/24HR No dose, route, or frequency recorded.       No Known Allergies    Objective    Objective     Vitals:   Visit Vitals  /82   Ht 177.8 cm (70\")   Wt 120 kg (264 lb)   LMP 03/28/2023 (Exact Date)   BMI 37.88 kg/m²        Physical Exam  Vitals and nursing note reviewed. Exam conducted with a chaperone present (Consent for exam obtained verbally from patient.).   Constitutional:       General: She is not in acute distress.     Appearance: Normal appearance. " She is well-developed. She is obese.   HENT:      Head: Normocephalic and atraumatic.   Eyes:      General: No scleral icterus.     Conjunctiva/sclera: Conjunctivae normal.   Pulmonary:      Effort: Pulmonary effort is normal. No respiratory distress.   Chest:   Breasts:     Breasts are symmetrical.      Right: Normal.      Left: Normal.   Abdominal:      General: There is no distension.      Palpations: Abdomen is soft. There is no mass.      Tenderness: There is no abdominal tenderness.   Genitourinary:     General: Normal vulva.      Exam position: Lithotomy position.      Pubic Area: No pubic lice.       Labia:         Right: No tenderness or lesion.         Left: No tenderness or lesion.       Urethra: No prolapse.      Vagina: No foreign body. No vaginal discharge, tenderness, bleeding, lesions or prolapsed vaginal walls.      Cervix: Normal.      Uterus: Not deviated, not enlarged, not fixed, not tender and no uterine prolapse.       Adnexa:         Right: No mass, tenderness or fullness.          Left: No mass, tenderness or fullness.        Rectum: No external hemorrhoid.   Musculoskeletal:      Right lower leg: No edema.      Left lower leg: No edema.   Lymphadenopathy:      Upper Body:      Right upper body: No supraclavicular, axillary or pectoral adenopathy.      Left upper body: No supraclavicular, axillary or pectoral adenopathy.   Skin:     General: Skin is warm and dry.      Coloration: Skin is not cyanotic, jaundiced or pale.   Neurological:      General: No focal deficit present.      Mental Status: She is alert and oriented to person, place, and time.   Psychiatric:         Mood and Affect: Mood normal.         Behavior: Behavior is cooperative.       Result Review    Liquid-based Pap Smear, Screening (03/14/2022 15:20 EDT)  NILM        Assessment & Plan   Assessment / Plan     Diagnoses and all orders for this visit:    1. Encounter for surveillance of transdermal patch hormonal contraceptive  device (Primary)  -     norelgestromin-ethinyl estradiol (ORTHO EVRA) 150-35 MCG/24HR; Place 1 patch on the skin as directed by provider 1 (One) Time Per Week for 30 days.  Dispense: 3 patch; Refill: 12    2. Encounter for annual routine gynecological examination    3. Screening examination for venereal disease    4. History of removal of ovarian cyst    5. BMI 37.0-37.9, adult        Discussion:   BMI Body mass index is 37.88 kg/m²..   Colonoscopy: at age 44  Mammogram: at age 39  DEXA:  at age 64  Pap smear, per ASCCP guidelines: Up to date  STI screening: declines  Contraception: desires  HPV vaccine: completed    Encouraged self breast awareness.  Encouraged proactive weight management and importance of maintaining a healthy weight.   Encouraged regular exercise and the importance of same, in regards to a healthy heart as well as helping to maintain her weight and improving her mental health.      Follow-up: Return in about 1 year (around 4/11/2024), or if symptoms worsen or fail to improve, for Annual physical.    Cortes Mejia MD

## 2023-04-17 ENCOUNTER — OFFICE VISIT (OUTPATIENT)
Age: 23
End: 2023-04-17
Payer: COMMERCIAL

## 2023-04-17 VITALS
RESPIRATION RATE: 20 BRPM | DIASTOLIC BLOOD PRESSURE: 76 MMHG | HEIGHT: 70 IN | OXYGEN SATURATION: 97 % | SYSTOLIC BLOOD PRESSURE: 128 MMHG | WEIGHT: 284 LBS | HEART RATE: 83 BPM | BODY MASS INDEX: 40.66 KG/M2 | TEMPERATURE: 97.3 F

## 2023-04-17 DIAGNOSIS — J02.9 SORE THROAT: Primary | ICD-10-CM

## 2023-04-17 LAB — S PYO AG THROAT QL: NORMAL

## 2023-04-17 PROCEDURE — 87880 STREP A ASSAY W/OPTIC: CPT | Performed by: NURSE PRACTITIONER

## 2023-04-17 PROCEDURE — 99213 OFFICE O/P EST LOW 20 MIN: CPT | Performed by: NURSE PRACTITIONER

## 2023-04-17 RX ORDER — BUPROPION HYDROCHLORIDE 150 MG/1
TABLET ORAL
COMMUNITY
Start: 2023-04-05

## 2023-04-17 RX ORDER — SEMAGLUTIDE 0.5 MG/.5ML
0.5 INJECTION, SOLUTION SUBCUTANEOUS WEEKLY
COMMUNITY
Start: 2023-02-28

## 2023-04-17 ASSESSMENT — ENCOUNTER SYMPTOMS
SORE THROAT: 1
VOICE CHANGE: 0
ABDOMINAL PAIN: 0
VOMITING: 0
EYES NEGATIVE: 1
RHINORRHEA: 0
RESPIRATORY NEGATIVE: 1
NAUSEA: 0
COUGH: 0
GASTROINTESTINAL NEGATIVE: 1
TROUBLE SWALLOWING: 1
WHEEZING: 0

## 2023-04-17 NOTE — PATIENT INSTRUCTIONS
1. Take flonase OTC. 2. Take antihistamine and decongestant as prescribed  3. Take cough suppressants as prescribed  4. Increase fluids and rest  5.  Return to clinic if symptoms worsen or fail to improve

## 2023-04-17 NOTE — PROGRESS NOTES
submental, submandibular, tonsillar, preauricular, posterior auricular or occipital adenopathy. Skin:     General: Skin is warm and moist.      Capillary Refill: Capillary refill takes less than 2 seconds. Neurological:      Mental Status: She is alert and oriented to person, place, and time. Psychiatric:         Speech: Speech normal.         Behavior: Behavior normal.         Thought Content: Thought content normal.         Judgment: Judgment normal.     /76   Pulse 83   Temp 97.3 °F (36.3 °C) (Temporal)   Resp 20   Ht 5' 10\" (1.778 m)   Wt 284 lb (128.8 kg)   LMP 03/02/2022 (Exact Date)   SpO2 97%   BMI 40.75 kg/m²     Assessment:          Diagnosis Orders   1. Sore throat  POCT rapid strep A          Plan:      Orders Placed This Encounter   Procedures    POCT rapid strep A        No follow-ups on file. Orders Placed This Encounter   Procedures    POCT rapid strep A     No orders of the defined types were placed in this encounter. Patient given educationalmaterials - see patient instructions. Discussed use, benefit, and side effectsof prescribed medications. All patient questions answered. Pt voiced understanding. Reviewed health maintenance. Instructed to continue current medications, diet andexercise. Patient agreed with treatment plan. Follow up as directed. There are no Patient Instructions on file for this visit.       Electronically signed by ERI Galvan CNP on 4/17/2023 at 10:42 AM

## 2024-04-15 ENCOUNTER — OFFICE VISIT (OUTPATIENT)
Dept: OBSTETRICS AND GYNECOLOGY | Age: 24
End: 2024-04-15
Payer: COMMERCIAL

## 2024-04-15 VITALS
WEIGHT: 286 LBS | SYSTOLIC BLOOD PRESSURE: 134 MMHG | DIASTOLIC BLOOD PRESSURE: 92 MMHG | BODY MASS INDEX: 40.94 KG/M2 | HEIGHT: 70 IN

## 2024-04-15 DIAGNOSIS — Z30.45 ENCOUNTER FOR SURVEILLANCE OF TRANSDERMAL PATCH HORMONAL CONTRACEPTIVE DEVICE: ICD-10-CM

## 2024-04-15 DIAGNOSIS — Z01.419 WELL WOMAN EXAM WITH ROUTINE GYNECOLOGICAL EXAM: Primary | ICD-10-CM

## 2024-04-15 PROCEDURE — 99459 PELVIC EXAMINATION: CPT

## 2024-04-15 PROCEDURE — 99395 PREV VISIT EST AGE 18-39: CPT

## 2024-04-15 RX ORDER — LEVOTHYROXINE SODIUM 0.07 MG/1
TABLET ORAL
COMMUNITY
Start: 2024-02-12

## 2024-04-15 RX ORDER — NORELGESTROMIN AND ETHINYL ESTRADIOL 35; 150 UG/MG; UG/MG
1 PATCH TRANSDERMAL WEEKLY
Qty: 3 PATCH | Refills: 12 | Status: SHIPPED | OUTPATIENT
Start: 2024-04-15 | End: 2024-05-15

## 2024-04-16 NOTE — PROGRESS NOTES
"Subjective     Macy Hernandez is a 24 y.o. female    History of Present Illness  The patient presents to the office today for her annual well woman examination. The patient denies any GYN problems or concerns at this time. She is doing well with her transdermal contraception patch and wishes to continue use of this.   Gynecologic Exam  The patient's pertinent negatives include no genital itching, genital lesions, genital odor, genital rash, missed menses, pelvic pain, vaginal bleeding or vaginal discharge. The patient is experiencing no pain. Pertinent negatives include no abdominal pain, anorexia, back pain, chills, constipation, diarrhea, discolored urine, dysuria, fever, flank pain, frequency, headaches, hematuria, joint pain, joint swelling, nausea, painful intercourse, rash, sore throat, urgency or vomiting. She is sexually active. No, her partner does not have an STD. She uses a patch for contraception. Her menstrual history has been regular. The maximum temperature recorded prior to her arrival was no fever.         /92 (BP Location: Left arm, Patient Position: Sitting, Cuff Size: Adult)   Ht 177.8 cm (70\")   Wt 130 kg (286 lb)   LMP 04/09/2024   BMI 41.04 kg/m²     Outpatient Encounter Medications as of 4/15/2024   Medication Sig Dispense Refill    buPROPion XL (WELLBUTRIN XL) 150 MG 24 hr tablet       FLUoxetine (PROzac) 20 MG capsule Take 1 capsule by mouth Daily.      FLUoxetine (PROzac) 40 MG capsule Take 1 capsule by mouth.      FLUoxetine (PROzac) 40 MG capsule       levothyroxine (SYNTHROID, LEVOTHROID) 125 MCG tablet Take 50 mcg by mouth Daily.      levothyroxine (SYNTHROID, LEVOTHROID) 75 MCG tablet       LORazepam (ATIVAN) 0.5 MG tablet Take 1 tablet by mouth.      norelgestromin-ethinyl estradiol (ORTHO EVRA) 150-35 MCG/24HR Place 1 patch on the skin as directed by provider 1 (One) Time Per Week for 30 days. 3 patch 12    ondansetron ODT (ZOFRAN-ODT) 8 MG disintegrating tablet Place 1 " tablet on the tongue and allow to dissolve as needed three times daily for 10 days 30 tablet 0    propranolol (INDERAL) 20 MG tablet Take 2 tablets by mouth Daily.      propranolol (INDERAL) 40 MG tablet       [DISCONTINUED] norelgestromin-ethinyl estradiol (ORTHO EVRA) 150-35 MCG/24HR Place 1 patch on the skin as directed by provider 1 (One) Time Per Week for 30 days. 3 patch 12    [DISCONTINUED] Semaglutide-Weight Management (Wegovy) 0.5 MG/0.5ML solution auto-injector Inject 0.5 mL under the skin into the appropriate area as directed 1 (One) Time Per Week. 2 mL 0    [DISCONTINUED] Zafemy 150-35 MCG/24HR        No facility-administered encounter medications on file as of 4/15/2024.       Past Medical History  Past Medical History:   Diagnosis Date    Anxiety     Hypothyroid     Ovarian cyst        Surgical History  Past Surgical History:   Procedure Laterality Date    LAPAROSCOPIC OVARIAN CYSTECTOMY Right     WISDOM TOOTH EXTRACTION         Family History  Family History   Problem Relation Age of Onset    Heart disease Paternal Grandfather     Colon polyps Paternal Grandfather     Heart disease Paternal Grandmother     Heart disease Maternal Grandmother     Heart disease Maternal Grandfather     Heart disease Other     Ovarian cancer Maternal Great-Grandmother     Colon cancer Neg Hx        The following portions of the patient's history were reviewed and updated as appropriate: allergies, current medications, past family history, past medical history, past social history, past surgical history, and problem list.    Review of Systems   Constitutional: Negative.  Negative for chills and fever.   HENT: Negative.  Negative for sore throat.    Eyes: Negative.    Respiratory: Negative.     Cardiovascular: Negative.    Gastrointestinal: Negative.  Negative for abdominal pain, anorexia, constipation, diarrhea, nausea and vomiting.   Endocrine: Negative.    Genitourinary: Negative.  Negative for dysuria, flank pain,  frequency, hematuria, missed menses, pelvic pain, urgency and vaginal discharge.   Musculoskeletal: Negative.  Negative for back pain and joint pain.   Skin: Negative.  Negative for rash.   Allergic/Immunologic: Negative.    Neurological: Negative.    Hematological: Negative.    Psychiatric/Behavioral: Negative.         Objective   Physical Exam  Vitals and nursing note reviewed. Exam conducted with a chaperone present.   Constitutional:       General: She is not in acute distress.     Appearance: She is well-developed. She is obese. She is not diaphoretic.   HENT:      Head: Normocephalic.      Right Ear: External ear normal.      Left Ear: External ear normal.      Nose: Nose normal.   Eyes:      General: No scleral icterus.        Right eye: No discharge.         Left eye: No discharge.      Conjunctiva/sclera: Conjunctivae normal.      Pupils: Pupils are equal, round, and reactive to light.   Neck:      Thyroid: No thyromegaly.      Vascular: No carotid bruit.      Trachea: No tracheal deviation.   Cardiovascular:      Rate and Rhythm: Normal rate and regular rhythm.      Pulses: Normal pulses.      Heart sounds: Normal heart sounds. No murmur heard.  Pulmonary:      Effort: Pulmonary effort is normal. No respiratory distress.      Breath sounds: Normal breath sounds. No wheezing.   Chest:   Breasts:     Breasts are symmetrical.      Right: Normal. No swelling, bleeding, inverted nipple, mass, nipple discharge, skin change or tenderness.      Left: Normal. No swelling, bleeding, inverted nipple, mass, nipple discharge, skin change or tenderness.   Abdominal:      General: Bowel sounds are normal. There is no distension.      Palpations: Abdomen is soft. There is no mass.      Tenderness: There is no abdominal tenderness. There is no right CVA tenderness, left CVA tenderness or guarding.      Hernia: No hernia is present. There is no hernia in the left inguinal area or right inguinal area.   Genitourinary:      General: Normal vulva.      Exam position: Lithotomy position.      Labia:         Right: No rash, tenderness, lesion or injury.         Left: No rash, tenderness, lesion or injury.       Vagina: Normal. No signs of injury and foreign body. No vaginal discharge, erythema, tenderness or bleeding.      Cervix: Normal.      Uterus: Normal. Not enlarged, not fixed and not tender.       Adnexa: Right adnexa normal and left adnexa normal.        Right: No mass, tenderness or fullness.          Left: No mass, tenderness or fullness.        Rectum: Normal. No mass.      Comments: BSU normal  Urethral meatus  Normal  Perineum  Normal  Musculoskeletal:         General: No tenderness. Normal range of motion.      Cervical back: Normal range of motion and neck supple.   Lymphadenopathy:      Head:      Right side of head: No submental, submandibular, tonsillar, preauricular, posterior auricular or occipital adenopathy.      Left side of head: No submental, submandibular, tonsillar, preauricular, posterior auricular or occipital adenopathy.      Cervical: No cervical adenopathy.      Right cervical: No superficial, deep or posterior cervical adenopathy.     Left cervical: No superficial, deep or posterior cervical adenopathy.      Upper Body:      Right upper body: No supraclavicular, axillary or pectoral adenopathy.      Left upper body: No supraclavicular, axillary or pectoral adenopathy.      Lower Body: No right inguinal adenopathy. No left inguinal adenopathy.   Skin:     General: Skin is warm and dry.      Findings: No bruising, erythema or rash.   Neurological:      Mental Status: She is alert and oriented to person, place, and time.      Coordination: Coordination normal.   Psychiatric:         Mood and Affect: Mood normal.         Behavior: Behavior normal.         Thought Content: Thought content normal.         Judgment: Judgment normal.         PHQ-9 Depression Screening  Little interest or pleasure in doing things?  0-->not at all   Feeling down, depressed, or hopeless? 0-->not at all   Trouble falling or staying asleep, or sleeping too much?     Feeling tired or having little energy?     Poor appetite or overeating?     Feeling bad about yourself - or that you are a failure or have let yourself or your family down?     Trouble concentrating on things, such as reading the newspaper or watching television?     Moving or speaking so slowly that other people could have noticed? Or the opposite - being so fidgety or restless that you have been moving around a lot more than usual?     Thoughts that you would be better off dead, or of hurting yourself in some way?     PHQ-9 Total Score 0   If you checked off any problems, how difficult have these problems made it for you to do your work, take care of things at home, or get along with other people?          Assessment & Plan   Diagnoses and all orders for this visit:    1. Well woman exam with routine gynecological exam (Primary)  Comments:  The patient presents to the office today for her annual well woman examination. The patient is established with her pcp and lab work is current. Her LMP was 4/9/2024. Overal, she reports she is doing well and has no GYN issues or concerns that she feels needs addressed today.    2. Encounter for surveillance of transdermal patch hormonal contraceptive device  Comments:  The patient reports doing well with use of transdermal contraceptive patch and wishes to continue. Refills sent to her pharmacy.  Orders:  -     norelgestromin-ethinyl estradiol (ORTHO EVRA) 150-35 MCG/24HR; Place 1 patch on the skin as directed by provider 1 (One) Time Per Week for 30 days.  Dispense: 3 patch; Refill: 12         Normal GYN exam. Encouraged SBE, pt is aware how to do self breast exam and the importance of same. Discussed weight management and importance of maintaining a healthy weight. Discussed Vitamin D intake and the importance of adequate vitamin D for both  bone health and a healthy immune system.  Discussed daily exercise and the importance of same, in regards to a healthy heart as well as helping to maintain her weight and improving her mental health.  Pap smear is not done per ASCCP guidelines. HPV is not done. Lab work up is up to date.      Class 3 Severe Obesity (BMI >=40). Obesity-related health conditions include the following: none. Obesity is unchanged. BMI is is above average; BMI management plan is completed. We discussed portion control, increasing exercise, and Information on healthy weight added to patient's after visit summary.      Daniela Dunham, APRN  4/16/2024

## 2024-06-03 ENCOUNTER — TRANSCRIBE ORDERS (OUTPATIENT)
Dept: ADMINISTRATIVE | Facility: HOSPITAL | Age: 24
End: 2024-06-03
Payer: COMMERCIAL

## 2024-06-03 DIAGNOSIS — I10 ESSENTIAL (PRIMARY) HYPERTENSION: ICD-10-CM

## 2024-06-03 DIAGNOSIS — Z00.00 ENCOUNTER FOR GENERAL ADULT MEDICAL EXAMINATION WITHOUT ABNORMAL FINDINGS: Primary | ICD-10-CM

## 2024-06-03 DIAGNOSIS — E03.9 HYPOTHYROIDISM, UNSPECIFIED TYPE: ICD-10-CM

## 2024-06-11 ENCOUNTER — LAB (OUTPATIENT)
Dept: LAB | Facility: HOSPITAL | Age: 24
End: 2024-06-11
Payer: COMMERCIAL

## 2024-06-11 DIAGNOSIS — E03.9 HYPOTHYROIDISM, UNSPECIFIED TYPE: ICD-10-CM

## 2024-06-11 DIAGNOSIS — Z00.00 ENCOUNTER FOR GENERAL ADULT MEDICAL EXAMINATION WITHOUT ABNORMAL FINDINGS: ICD-10-CM

## 2024-06-11 DIAGNOSIS — I10 ESSENTIAL (PRIMARY) HYPERTENSION: ICD-10-CM

## 2024-06-11 LAB
ALBUMIN SERPL-MCNC: 4.2 G/DL (ref 3.5–5.2)
ALBUMIN/GLOB SERPL: 1.4 G/DL
ALP SERPL-CCNC: 71 U/L (ref 39–117)
ALT SERPL W P-5'-P-CCNC: 105 U/L (ref 1–33)
ANION GAP SERPL CALCULATED.3IONS-SCNC: 10 MMOL/L (ref 5–15)
AST SERPL-CCNC: 45 U/L (ref 1–32)
BASOPHILS # BLD AUTO: 0.1 10*3/MM3 (ref 0–0.2)
BASOPHILS NFR BLD AUTO: 0.9 % (ref 0–1.5)
BILIRUB SERPL-MCNC: 0.3 MG/DL (ref 0–1.2)
BUN SERPL-MCNC: 11 MG/DL (ref 6–20)
BUN/CREAT SERPL: 20.4 (ref 7–25)
CALCIUM SPEC-SCNC: 9.3 MG/DL (ref 8.6–10.5)
CHLORIDE SERPL-SCNC: 102 MMOL/L (ref 98–107)
CHOLEST SERPL-MCNC: 172 MG/DL (ref 0–200)
CO2 SERPL-SCNC: 24 MMOL/L (ref 22–29)
CREAT SERPL-MCNC: 0.54 MG/DL (ref 0.57–1)
DEPRECATED RDW RBC AUTO: 40 FL (ref 37–54)
EGFRCR SERPLBLD CKD-EPI 2021: 131.2 ML/MIN/1.73
EOSINOPHIL # BLD AUTO: 0.39 10*3/MM3 (ref 0–0.4)
EOSINOPHIL NFR BLD AUTO: 3.4 % (ref 0.3–6.2)
ERYTHROCYTE [DISTWIDTH] IN BLOOD BY AUTOMATED COUNT: 12.1 % (ref 12.3–15.4)
GLOBULIN UR ELPH-MCNC: 3 GM/DL
GLUCOSE SERPL-MCNC: 91 MG/DL (ref 65–99)
HBA1C MFR BLD: 5.2 % (ref 4.8–5.6)
HCT VFR BLD AUTO: 39.1 % (ref 34–46.6)
HDLC SERPL-MCNC: 44 MG/DL (ref 40–60)
HGB BLD-MCNC: 13.1 G/DL (ref 12–15.9)
IMM GRANULOCYTES # BLD AUTO: 0.03 10*3/MM3 (ref 0–0.05)
IMM GRANULOCYTES NFR BLD AUTO: 0.3 % (ref 0–0.5)
LDLC SERPL CALC-MCNC: 106 MG/DL (ref 0–100)
LDLC/HDLC SERPL: 2.36 {RATIO}
LYMPHOCYTES # BLD AUTO: 4.15 10*3/MM3 (ref 0.7–3.1)
LYMPHOCYTES NFR BLD AUTO: 35.7 % (ref 19.6–45.3)
MCH RBC QN AUTO: 30.3 PG (ref 26.6–33)
MCHC RBC AUTO-ENTMCNC: 33.5 G/DL (ref 31.5–35.7)
MCV RBC AUTO: 90.3 FL (ref 79–97)
MONOCYTES # BLD AUTO: 0.9 10*3/MM3 (ref 0.1–0.9)
MONOCYTES NFR BLD AUTO: 7.7 % (ref 5–12)
NEUTROPHILS NFR BLD AUTO: 52 % (ref 42.7–76)
NEUTROPHILS NFR BLD AUTO: 6.07 10*3/MM3 (ref 1.7–7)
NRBC BLD AUTO-RTO: 0 /100 WBC (ref 0–0.2)
PLATELET # BLD AUTO: 283 10*3/MM3 (ref 140–450)
PMV BLD AUTO: 10.9 FL (ref 6–12)
POTASSIUM SERPL-SCNC: 3.7 MMOL/L (ref 3.5–5.2)
PROT SERPL-MCNC: 7.2 G/DL (ref 6–8.5)
RBC # BLD AUTO: 4.33 10*6/MM3 (ref 3.77–5.28)
SODIUM SERPL-SCNC: 136 MMOL/L (ref 136–145)
T4 FREE SERPL-MCNC: 1.29 NG/DL (ref 0.93–1.7)
TRIGL SERPL-MCNC: 120 MG/DL (ref 0–150)
TSH SERPL DL<=0.05 MIU/L-ACNC: 3.45 UIU/ML (ref 0.27–4.2)
VLDLC SERPL-MCNC: 22 MG/DL (ref 5–40)
WBC NRBC COR # BLD AUTO: 11.64 10*3/MM3 (ref 3.4–10.8)

## 2024-06-11 PROCEDURE — 36415 COLL VENOUS BLD VENIPUNCTURE: CPT

## 2024-06-11 PROCEDURE — 83036 HEMOGLOBIN GLYCOSYLATED A1C: CPT

## 2024-06-11 PROCEDURE — 80050 GENERAL HEALTH PANEL: CPT

## 2024-06-11 PROCEDURE — 80061 LIPID PANEL: CPT

## 2024-06-11 PROCEDURE — 84439 ASSAY OF FREE THYROXINE: CPT

## 2024-10-03 ENCOUNTER — TRANSCRIBE ORDERS (OUTPATIENT)
Dept: ADMINISTRATIVE | Facility: HOSPITAL | Age: 24
End: 2024-10-03
Payer: COMMERCIAL

## 2024-10-03 DIAGNOSIS — E03.9 HYPOTHYROIDISM, UNSPECIFIED TYPE: Primary | ICD-10-CM

## 2025-01-07 ENCOUNTER — TRANSCRIBE ORDERS (OUTPATIENT)
Dept: ADMINISTRATIVE | Facility: HOSPITAL | Age: 25
End: 2025-01-07
Payer: COMMERCIAL

## 2025-01-07 DIAGNOSIS — I10 ESSENTIAL (PRIMARY) HYPERTENSION: Primary | ICD-10-CM

## 2025-01-07 DIAGNOSIS — E03.9 HYPOTHYROIDISM, UNSPECIFIED TYPE: ICD-10-CM

## 2025-01-07 DIAGNOSIS — E66.01 MORBID (SEVERE) OBESITY DUE TO EXCESS CALORIES: ICD-10-CM

## 2025-01-24 ENCOUNTER — APPOINTMENT (OUTPATIENT)
Dept: CT IMAGING | Facility: HOSPITAL | Age: 25
End: 2025-01-24
Payer: COMMERCIAL

## 2025-01-24 ENCOUNTER — HOSPITAL ENCOUNTER (EMERGENCY)
Facility: HOSPITAL | Age: 25
Discharge: HOME | End: 2025-01-24
Attending: STUDENT IN AN ORGANIZED HEALTH CARE EDUCATION/TRAINING PROGRAM
Payer: COMMERCIAL

## 2025-01-24 VITALS
OXYGEN SATURATION: 95 % | WEIGHT: 290 LBS | HEART RATE: 75 BPM | DIASTOLIC BLOOD PRESSURE: 81 MMHG | TEMPERATURE: 98.6 F | RESPIRATION RATE: 16 BRPM | HEIGHT: 69 IN | BODY MASS INDEX: 42.95 KG/M2 | SYSTOLIC BLOOD PRESSURE: 134 MMHG

## 2025-01-24 DIAGNOSIS — R10.9 ABDOMINAL PAIN, UNSPECIFIED ABDOMINAL LOCATION: ICD-10-CM

## 2025-01-24 DIAGNOSIS — R03.0 ELEVATED BLOOD PRESSURE READING: Primary | ICD-10-CM

## 2025-01-24 LAB
ALBUMIN SERPL-MCNC: 4.3 G/DL (ref 3.5–5.2)
ALBUMIN/GLOB SERPL: 1.5 G/DL
ALP SERPL-CCNC: 67 U/L (ref 39–117)
ALT SERPL W P-5'-P-CCNC: 50 U/L (ref 1–33)
ANION GAP SERPL CALCULATED.3IONS-SCNC: 9 MMOL/L (ref 5–15)
AST SERPL-CCNC: 26 U/L (ref 1–32)
BASOPHILS # BLD AUTO: 0.1 10*3/MM3 (ref 0–0.2)
BASOPHILS NFR BLD AUTO: 0.9 % (ref 0–1.5)
BILIRUB SERPL-MCNC: 0.2 MG/DL (ref 0–1.2)
BILIRUB UR QL STRIP: NEGATIVE
BUN SERPL-MCNC: 9 MG/DL (ref 6–20)
BUN/CREAT SERPL: 15.8 (ref 7–25)
CALCIUM SPEC-SCNC: 9.4 MG/DL (ref 8.6–10.5)
CHLORIDE SERPL-SCNC: 104 MMOL/L (ref 98–107)
CLARITY UR: CLEAR
CO2 SERPL-SCNC: 25 MMOL/L (ref 22–29)
COLOR UR: YELLOW
CREAT SERPL-MCNC: 0.57 MG/DL (ref 0.57–1)
D-LACTATE SERPL-SCNC: 0.8 MMOL/L (ref 0.5–2)
DEPRECATED RDW RBC AUTO: 40.5 FL (ref 37–54)
EGFRCR SERPLBLD CKD-EPI 2021: 129.5 ML/MIN/1.73
EOSINOPHIL # BLD AUTO: 0.48 10*3/MM3 (ref 0–0.4)
EOSINOPHIL NFR BLD AUTO: 4.2 % (ref 0.3–6.2)
ERYTHROCYTE [DISTWIDTH] IN BLOOD BY AUTOMATED COUNT: 12.2 % (ref 12.3–15.4)
GLOBULIN UR ELPH-MCNC: 2.9 GM/DL
GLUCOSE SERPL-MCNC: 106 MG/DL (ref 65–99)
GLUCOSE UR STRIP-MCNC: NEGATIVE MG/DL
HCG SERPL QL: NEGATIVE
HCT VFR BLD AUTO: 39.1 % (ref 34–46.6)
HGB BLD-MCNC: 13.4 G/DL (ref 12–15.9)
HGB UR QL STRIP.AUTO: NEGATIVE
IMM GRANULOCYTES # BLD AUTO: 0.03 10*3/MM3 (ref 0–0.05)
IMM GRANULOCYTES NFR BLD AUTO: 0.3 % (ref 0–0.5)
KETONES UR QL STRIP: NEGATIVE
LEUKOCYTE ESTERASE UR QL STRIP.AUTO: NEGATIVE
LIPASE SERPL-CCNC: 26 U/L (ref 13–60)
LYMPHOCYTES # BLD AUTO: 3.11 10*3/MM3 (ref 0.7–3.1)
LYMPHOCYTES NFR BLD AUTO: 27.1 % (ref 19.6–45.3)
MAGNESIUM SERPL-MCNC: 1.9 MG/DL (ref 1.6–2.6)
MCH RBC QN AUTO: 30.9 PG (ref 26.6–33)
MCHC RBC AUTO-ENTMCNC: 34.3 G/DL (ref 31.5–35.7)
MCV RBC AUTO: 90.1 FL (ref 79–97)
MONOCYTES # BLD AUTO: 0.81 10*3/MM3 (ref 0.1–0.9)
MONOCYTES NFR BLD AUTO: 7.1 % (ref 5–12)
NEUTROPHILS NFR BLD AUTO: 6.93 10*3/MM3 (ref 1.7–7)
NEUTROPHILS NFR BLD AUTO: 60.4 % (ref 42.7–76)
NITRITE UR QL STRIP: NEGATIVE
NRBC BLD AUTO-RTO: 0 /100 WBC (ref 0–0.2)
PH UR STRIP.AUTO: 6.5 [PH] (ref 5–8)
PLATELET # BLD AUTO: 219 10*3/MM3 (ref 140–450)
PMV BLD AUTO: 11 FL (ref 6–12)
POTASSIUM SERPL-SCNC: 3.6 MMOL/L (ref 3.5–5.2)
PROT SERPL-MCNC: 7.2 G/DL (ref 6–8.5)
PROT UR QL STRIP: NEGATIVE
RBC # BLD AUTO: 4.34 10*6/MM3 (ref 3.77–5.28)
SODIUM SERPL-SCNC: 138 MMOL/L (ref 136–145)
SP GR UR STRIP: 1.02 (ref 1–1.03)
UROBILINOGEN UR QL STRIP: NORMAL
WBC NRBC COR # BLD AUTO: 11.46 10*3/MM3 (ref 3.4–10.8)

## 2025-01-24 PROCEDURE — 80053 COMPREHEN METABOLIC PANEL: CPT | Performed by: NURSE PRACTITIONER

## 2025-01-24 PROCEDURE — 83735 ASSAY OF MAGNESIUM: CPT | Performed by: NURSE PRACTITIONER

## 2025-01-24 PROCEDURE — 84703 CHORIONIC GONADOTROPIN ASSAY: CPT | Performed by: NURSE PRACTITIONER

## 2025-01-24 PROCEDURE — 25810000003 SODIUM CHLORIDE 0.9 % SOLUTION: Performed by: NURSE PRACTITIONER

## 2025-01-24 PROCEDURE — 99285 EMERGENCY DEPT VISIT HI MDM: CPT

## 2025-01-24 PROCEDURE — 25510000001 IOPAMIDOL 61 % SOLUTION: Performed by: NURSE PRACTITIONER

## 2025-01-24 PROCEDURE — 36415 COLL VENOUS BLD VENIPUNCTURE: CPT

## 2025-01-24 PROCEDURE — 96375 TX/PRO/DX INJ NEW DRUG ADDON: CPT

## 2025-01-24 PROCEDURE — 96374 THER/PROPH/DIAG INJ IV PUSH: CPT

## 2025-01-24 PROCEDURE — 74178 CT ABD&PLV WO CNTR FLWD CNTR: CPT

## 2025-01-24 PROCEDURE — 93005 ELECTROCARDIOGRAM TRACING: CPT | Performed by: NURSE PRACTITIONER

## 2025-01-24 PROCEDURE — 25010000002 ONDANSETRON PER 1 MG: Performed by: NURSE PRACTITIONER

## 2025-01-24 PROCEDURE — 83605 ASSAY OF LACTIC ACID: CPT | Performed by: NURSE PRACTITIONER

## 2025-01-24 PROCEDURE — 81003 URINALYSIS AUTO W/O SCOPE: CPT | Performed by: NURSE PRACTITIONER

## 2025-01-24 PROCEDURE — 25010000002 HYDROMORPHONE PER 4 MG: Performed by: NURSE PRACTITIONER

## 2025-01-24 PROCEDURE — 83690 ASSAY OF LIPASE: CPT | Performed by: NURSE PRACTITIONER

## 2025-01-24 PROCEDURE — 85025 COMPLETE CBC W/AUTO DIFF WBC: CPT | Performed by: NURSE PRACTITIONER

## 2025-01-24 RX ORDER — HYDROMORPHONE HYDROCHLORIDE 1 MG/ML
0.5 INJECTION, SOLUTION INTRAMUSCULAR; INTRAVENOUS; SUBCUTANEOUS ONCE
Status: COMPLETED | OUTPATIENT
Start: 2025-01-24 | End: 2025-01-24

## 2025-01-24 RX ORDER — FAMOTIDINE 20 MG/1
20 TABLET, FILM COATED ORAL NIGHTLY PRN
Qty: 14 TABLET | Refills: 0 | Status: SHIPPED | OUTPATIENT
Start: 2025-01-24

## 2025-01-24 RX ORDER — IOPAMIDOL 612 MG/ML
100 INJECTION, SOLUTION INTRAVASCULAR
Status: COMPLETED | OUTPATIENT
Start: 2025-01-24 | End: 2025-01-24

## 2025-01-24 RX ORDER — ONDANSETRON 2 MG/ML
4 INJECTION INTRAMUSCULAR; INTRAVENOUS ONCE
Status: COMPLETED | OUTPATIENT
Start: 2025-01-24 | End: 2025-01-24

## 2025-01-24 RX ORDER — ONDANSETRON 4 MG/1
4 TABLET, FILM COATED ORAL EVERY 8 HOURS PRN
Qty: 15 TABLET | Refills: 0 | Status: SHIPPED | OUTPATIENT
Start: 2025-01-24

## 2025-01-24 RX ORDER — LEVOTHYROXINE SODIUM 125 UG/1
125 TABLET ORAL
COMMUNITY

## 2025-01-24 RX ORDER — SODIUM CHLORIDE 0.9 % (FLUSH) 0.9 %
10 SYRINGE (ML) INJECTION AS NEEDED
Status: DISCONTINUED | OUTPATIENT
Start: 2025-01-24 | End: 2025-01-24 | Stop reason: HOSPADM

## 2025-01-24 RX ADMIN — SODIUM CHLORIDE 500 ML: 9 INJECTION, SOLUTION INTRAVENOUS at 02:21

## 2025-01-24 RX ADMIN — ONDANSETRON 4 MG: 2 INJECTION INTRAMUSCULAR; INTRAVENOUS at 02:25

## 2025-01-24 RX ADMIN — IOPAMIDOL 100 ML: 612 INJECTION, SOLUTION INTRAVENOUS at 02:42

## 2025-01-24 RX ADMIN — HYDROMORPHONE HYDROCHLORIDE 0.5 MG: 1 INJECTION, SOLUTION INTRAMUSCULAR; INTRAVENOUS; SUBCUTANEOUS at 02:23

## 2025-01-24 NOTE — ED PROVIDER NOTES
"Subjective   History of Present Illness  24-year-old female patient presents to the ED with complaints of low-mid back pain that radiates through to the umbilicus and both shoulders with onset 3 days ago. She reports that this initial pain resolved independently, but she awoke with returned pain just prior to arrival. She is also now experiencing bilateral flank pain and diffuse upper abdominal pain.  Pain is described as \"sharp\", 7/10. She reports nausea and \"dry heaves\" but denies any vomiting. She denies diarrhea, but reports looser stools. No black or bloody stools. Denies any urinary changes. Denies fever and chills. Denies chest pain and SOA. Abdominal surgical history includes ovarian cyst removal only.             ED Triage Vitals [01/24/25 0045]   Temp Heart Rate Resp BP SpO2   98.2 °F (36.8 °C) 94 16 (!) 188/108 100 %      Temp src Heart Rate Source Patient Position BP Location FiO2 (%)   Oral Monitor Sitting Right arm --       Review of Systems   Constitutional:  Negative for chills and fever.   Respiratory:  Negative for shortness of breath.    Cardiovascular:  Negative for chest pain.   Gastrointestinal:  Positive for abdominal pain and nausea. Negative for diarrhea and vomiting.   Genitourinary:  Negative for difficulty urinating and dysuria.       Past Medical History:   Diagnosis Date    Anxiety     Anxiety Disorder, Generalized    Hypothyroid     Ovarian cyst        No Known Allergies    Past Surgical History:   Procedure Laterality Date    LAPAROSCOPIC OVARIAN CYSTECTOMY Right     WISDOM TOOTH EXTRACTION         Family History   Problem Relation Age of Onset    Heart disease Paternal Grandfather     Colon polyps Paternal Grandfather     Heart disease Paternal Grandmother     Heart disease Maternal Grandmother     Heart disease Maternal Grandfather     Heart disease Other     Ovarian cancer Maternal Great-Grandmother     Colon cancer Neg Hx        Social History     Socioeconomic History    Marital " status: Single   Tobacco Use    Smoking status: Never    Smokeless tobacco: Never   Substance and Sexual Activity    Alcohol use: No    Drug use: No    Sexual activity: Never           Objective   Physical Exam  Vitals and nursing note reviewed.   Constitutional:       General: She is awake. She is in acute distress (appears uncomfortable, grimacing).      Appearance: She is not ill-appearing, toxic-appearing or diaphoretic.   HENT:      Head: Normocephalic and atraumatic.   Eyes:      Conjunctiva/sclera: Conjunctivae normal.   Cardiovascular:      Rate and Rhythm: Normal rate and regular rhythm.      Heart sounds: Normal heart sounds. No murmur heard.  Pulmonary:      Effort: Pulmonary effort is normal. No respiratory distress.      Breath sounds: Normal breath sounds. No stridor.   Abdominal:      General: Bowel sounds are normal. There is no distension.      Palpations: Abdomen is soft.      Tenderness: There is generalized abdominal tenderness. There is no right CVA tenderness or left CVA tenderness.   Musculoskeletal:      Comments: Moves extremities independently and equally.    Skin:     General: Skin is warm and dry.      Capillary Refill: Capillary refill takes less than 2 seconds.   Neurological:      General: No focal deficit present.      Mental Status: She is alert.   Psychiatric:         Mood and Affect: Mood normal.         Behavior: Behavior normal. Behavior is cooperative.         Procedures    Lab Results (last 24 hours)       Procedure Component Value Units Date/Time    CBC & Differential [[id number]]  (Abnormal) Collected: 01/24/25 0118    Specimen: Blood from Arm, Right Updated: 01/24/25 0125    Narrative:      The following orders were created for panel order CBC & Differential.  Procedure                               Abnormality         Status                     ---------                               -----------         ------                     CBC Auto Differential[[id number]]         Abnormal            Final result                 Please view results for these tests on the individual orders.    Comprehensive Metabolic Panel [[id number]]  (Abnormal) Collected: 01/24/25 0118    Specimen: Blood from Arm, Right Updated: 01/24/25 0145     Glucose 106 mg/dL      BUN 9 mg/dL      Creatinine 0.57 mg/dL      Sodium 138 mmol/L      Potassium 3.6 mmol/L      Chloride 104 mmol/L      CO2 25.0 mmol/L      Calcium 9.4 mg/dL      Total Protein 7.2 g/dL      Albumin 4.3 g/dL      ALT (SGPT) 50 U/L      AST (SGOT) 26 U/L      Alkaline Phosphatase 67 U/L      Total Bilirubin 0.2 mg/dL      Globulin 2.9 gm/dL      A/G Ratio 1.5 g/dL      BUN/Creatinine Ratio 15.8     Anion Gap 9.0 mmol/L      eGFR 129.5 mL/min/1.73     Narrative:      GFR Categories in Chronic Kidney Disease (CKD)      GFR Category          GFR (mL/min/1.73)    Interpretation  G1                     90 or greater         Normal or high (1)  G2                      60-89                Mild decrease (1)  G3a                   45-59                Mild to moderate decrease  G3b                   30-44                Moderate to severe decrease  G4                    15-29                Severe decrease  G5                    14 or less           Kidney failure          (1)In the absence of evidence of kidney disease, neither GFR category G1 or G2 fulfill the criteria for CKD.    eGFR calculation 2021 CKD-EPI creatinine equation, which does not include race as a factor    Lipase [[id number]]  (Normal) Collected: 01/24/25 0118    Specimen: Blood from Arm, Right Updated: 01/24/25 0140     Lipase 26 U/L     hCG, Serum, Qualitative [[id number]]  (Normal) Collected: 01/24/25 0118    Specimen: Blood from Arm, Right Updated: 01/24/25 0139     HCG Qualitative Negative    Lactic Acid, Plasma [[id number]]  (Normal) Collected: 01/24/25 0118    Specimen: Blood from Arm, Right Updated: 01/24/25 0141     Lactate 0.8 mmol/L     CBC Auto Differential [[id number]]   "(Abnormal) Collected: 01/24/25 0118    Specimen: Blood from Arm, Right Updated: 01/24/25 0125     WBC 11.46 10*3/mm3      RBC 4.34 10*6/mm3      Hemoglobin 13.4 g/dL      Hematocrit 39.1 %      MCV 90.1 fL      MCH 30.9 pg      MCHC 34.3 g/dL      RDW 12.2 %      RDW-SD 40.5 fl      MPV 11.0 fL      Platelets 219 10*3/mm3      Neutrophil % 60.4 %      Lymphocyte % 27.1 %      Monocyte % 7.1 %      Eosinophil % 4.2 %      Basophil % 0.9 %      Immature Grans % 0.3 %      Neutrophils, Absolute 6.93 10*3/mm3      Lymphocytes, Absolute 3.11 10*3/mm3      Monocytes, Absolute 0.81 10*3/mm3      Eosinophils, Absolute 0.48 10*3/mm3      Basophils, Absolute 0.10 10*3/mm3      Immature Grans, Absolute 0.03 10*3/mm3      nRBC 0.0 /100 WBC     Urinalysis With Culture If Indicated - Urine, Clean Catch [[id number]]  (Normal) Collected: 01/24/25 0229    Specimen: Urine, Clean Catch Updated: 01/24/25 0244     Color, UA Yellow     Appearance, UA Clear     pH, UA 6.5     Specific Gravity, UA 1.018     Glucose, UA Negative     Ketones, UA Negative     Bilirubin, UA Negative     Blood, UA Negative     Protein, UA Negative     Leuk Esterase, UA Negative     Nitrite, UA Negative     Urobilinogen, UA 0.2 E.U./dL    Narrative:      In absence of clinical symptoms, the presence of pyuria, bacteria, and/or nitrites on the urinalysis result does not correlate with infection.  Urine microscopic not indicated.               ED Course  ED Course as of 01/24/25 1405   Fri Jan 24, 2025   0120 ECG 12 Lead Other; Abdominal Pain  Normal sinus rhythm 95bpm  Normal ECG  No previous ECGs available   [TD]   0200 BP(!): 188/108  Patient reports elevation related to \"anxiety\" and \"panic attacks\".  [TD]   0206 Shared decision making with the patient. Plan: Will obtain CT abdomen and pelvis. Urine pending.  [TD]   0208 CBC & Differential(!)  WBC 1.46, elevated.  Neutrophils 60.4, normal.  Lymphocytes 27.1, normal.  No anemia with RBC 4.34, H/H13.4/39.1, " normal.  Platelets 219, normal. [TD]   0208 Lactic Acid, Plasma  0.8, normal. [TD]   0209 Comprehensive Metabolic Panel(!)  Glucose 106, slightly elevated.  No additional electrolyte imbalance.  Normal renal function with BUN 9, creatinine 0.57, .  ALT 50, slightly elevated.  AST 26, normal.  Alkaline phosphatase 67, normal.  Total bilirubin 0.2, normal. [TD]   0210 Lipase  26, normal. [TD]   0210 hCG, Serum, Qualitative  Negative. [TD]   0245 Urinalysis With Culture If Indicated - Urine, Clean Catch  All values WNL [TD]   0326 To bedside, patient reports pain 3/10. Updated patient on results with CT pending.     Care transferred to ED attending (Zabrina) pending CT abdomen results.  [TD]   0334 PT was signed out to me due to end of shift of Mid level. Pending CT abd pelvis, which resulted negative for acute findings. She is no longer having pain, appears anxious. Abd is soft at this moment and non tender. Location of pain , epigastric can be gerd vs gastritis related. Mild elevation of ALT and WBC. No jaundice, no saldaña sign, no RUQ tenderness. PT stable for discharge, will give pepcid, encouraged to follow up with PMD. She feels comfortable being discharge. Negative for chest pain , cough or shortness of breath.  [SG]   0400 PT feeling better for discharge. Return precautions discussed.  [SG]      ED Course User Index  [SG] Gualberto Gerber MD  [TD] Anh Donohue APRN                                                       Medical Decision Making  Course of treatment in the ED:  Labs Reviewed  COMPREHENSIVE METABOLIC PANEL - Abnormal; Notable for the following components:     Glucose                       106 (*)                ALT (SGPT)                    50 (*)              All other components within normal limits         Narrative: GFR Categories in Chronic Kidney Disease (CKD)                                      GFR Category          GFR (mL/min/1.73)    Interpretation                  G1                      90 or greater         Normal or high (1)                  G2                      60-89                Mild decrease (1)                  G3a                   45-59                Mild to moderate decrease                  G3b                   30-44                Moderate to severe decrease                  G4                    15-29                Severe decrease                  G5                    14 or less           Kidney failure                                          (1)In the absence of evidence of kidney disease, neither GFR category G1 or G2 fulfill the criteria for CKD.                                    eGFR calculation 2021 CKD-EPI creatinine equation, which does not include race as a factor  CBC WITH AUTO DIFFERENTIAL - Abnormal; Notable for the following components:     WBC                           11.46 (*)               RDW                           12.2 (*)               Lymphocytes, Absolute         3.11 (*)               Eosinophils, Absolute         0.48 (*)            All other components within normal limits  LIPASE - Normal  HCG, SERUM, QUALITATIVE - Normal  LACTIC ACID, PLASMA - Normal  URINALYSIS W/ CULTURE IF INDICATED - Normal         Narrative: In absence of clinical symptoms, the presence of pyuria, bacteria, and/or nitrites on the urinalysis result does not correlate with infection.                  Urine microscopic not indicated.  MAGNESIUM - Normal  CBC AND DIFFERENTIAL    Medications  HYDROmorphone (DILAUDID) injection 0.5 mg (0.5 mg Intravenous Given 1/24/25 0223)  ondansetron (ZOFRAN) injection 4 mg (4 mg Intravenous Given 1/24/25 0225)  sodium chloride 0.9 % bolus 500 mL (0 mL Intravenous Stopped 1/24/25 0430)  iopamidol (ISOVUE-300) 61 % injection 100 mL (100 mL Intravenous Given 1/24/25 0242)    CT Abdomen Pelvis With & Without Contrast    (Results Pending)    This is Dr. Gerber, please see ED course note, for my note      Problems Addressed:  Abdominal  pain, unspecified abdominal location: complicated acute illness or injury  Elevated blood pressure reading: complicated acute illness or injury    Amount and/or Complexity of Data Reviewed  Labs: ordered. Decision-making details documented in ED Course.  Radiology: ordered.  ECG/medicine tests: ordered. Decision-making details documented in ED Course.    Risk  Prescription drug management.        Final diagnoses:   Elevated blood pressure reading   Abdominal pain, unspecified abdominal location       ED Disposition  ED Disposition       ED Disposition   Discharge    Condition   Stable    Comment   --               Uriel Bell MD  [address] [facility] [address]  [phone]    In 1 week           Medication List        New Prescriptions      famotidine 20 MG tablet  Commonly known as: PEPCID  Take 1 tablet by mouth At Night As Needed for Heartburn.     ondansetron 4 MG tablet  Commonly known as: ZOFRAN  Take 1 tablet by mouth Every 8 (Eight) Hours As Needed for Vomiting or Nausea.            Changed      FLUoxetine 40 MG capsule  Commonly known as: PROzac  What changed: Another medication with the same name was removed. Continue taking this medication, and follow the directions you see here.     levothyroxine 125 MCG tablet  Commonly known as: SYNTHROID, LEVOTHROID  What changed: Another medication with the same name was removed. Continue taking this medication, and follow the directions you see here.            Stop      buPROPion  MG 24 hr tablet  Commonly known as: WELLBUTRIN XL     ondansetron ODT 8 MG disintegrating tablet  Commonly known as: ZOFRAN-ODT     propranolol 20 MG tablet  Commonly known as: INDERAL     propranolol 40 MG tablet  Commonly known as: INDERAL               Where to Get Your Medications        These medications were sent to [facility] [place], [place] - [address] - [phone] [facility] [phone] [place] [address] [address], [facility] [address]      Phone: [phone]    famotidine 20 MG tablet  ondansetron 4 MG tablet            Gualberto Gerber MD  01/24/25 4112

## 2025-01-26 LAB
QT INTERVAL: 318 MS
QTC INTERVAL: 399 MS

## 2025-02-05 NOTE — H&P (VIEW-ONLY)
[facility] History and Physical  Macy Hernandez  MRN: [id number]  Age: 24 y.o. female     YOB: 2000    Primary   Problem      Concerns for biliary dyskinesia versus biliary colic    SUBJECTIVE  History  of Presenting Illness   Patient is a 24 y.o. female with pertinent past medical history of hypothyroidism, ovarian cyst status post laparoscopic ovarian cystectomy on the right, generalized anxiety disorder, morbid obesity with BMI of 42.8, presenting for evaluation of recent abdominal pain, nausea, anorexia.  She was recently seen in the emergency department, had a CT scan which was unremarkable, and labs that did not reveal an elevated white blood cell count or LFT elevation.  She describes her pain is generally right upper quadrant, sometimes wraps around her back, and right flank, is associated with consuming fatty foods, although some of the pain is constant all the time.  She does not have reflux symptoms, or burning.  She has not had a severe exacerbation of her pain since that trip to the emergency department.        Past Medical History     Past Medical History:  Past Medical History:   Diagnosis Date    Anxiety     Anxiety Disorder, Generalized    Hypothyroid     Ovarian cyst        PCP: Uriel Bell MD    Past Surgical History:  Past Surgical History:   Procedure Laterality Date    LAPAROSCOPIC OVARIAN CYSTECTOMY Right     WISDOM TOOTH EXTRACTION         Family History:  Family History   Problem Relation Age of Onset    Heart disease Paternal Grandfather     Colon polyps Paternal Grandfather     Heart disease Paternal Grandmother     Heart disease Maternal Grandmother     Heart disease Maternal Grandfather     Heart disease Other     Ovarian cancer Maternal Great-Grandmother     Colon cancer Neg Hx        Social History:  Social History     Tobacco Use    Smoking status: Never     Passive exposure: Never    Smokeless tobacco: Never   Substance Use Topics  "   Alcohol use: No       Allergies:  No Known Allergies    Medications:  Current Outpatient Medications   Medication Instructions    famotidine (PEPCID) 20 mg, Oral, Nightly PRN    FLUoxetine (PROZAC) 40 mg, Oral    levothyroxine (SYNTHROID, LEVOTHROID) 125 mcg, Every Early Morning    LORazepam (ATIVAN) 0.5 mg, Oral    norelgestromin-ethinyl estradiol (ORTHO EVRA) 150-35 MCG/24HR 1 patch, Transdermal, Weekly    ondansetron (ZOFRAN) 4 mg, Oral, Every 8 Hours PRN           Review of Systems   She otherwise denies lightheadedness, dizziness, fainting, passing out, headache, nausea, vomiting, chest pain, palpitations, shortness of breath, coughing, wheezing, heart burn, reflux, skin lesions, unintended weight loss/gain, sensitivity to heat/cold, changes in sensation, changes in vision/hearing/smell/taste, myalgias, arthralgias, and has no other acute complaints or concerning symptoms to report at this time.      Physical Examination     Vitals:    02/06/25 1256   BP: 140/80   Pulse: 103   SpO2: 98%   Weight: 132 kg (290 lb)   Height: 175.3 cm (69\")       Estimated body mass index is 42.83 kg/m² as calculated from the following:    Height as of this encounter: 175.3 cm (69\").    Weight as of this encounter: 132 kg (290 lb).  PREVIOUS WEIGHTS:   Wt Readings from Last 5 Encounters:   02/06/25 132 kg (290 lb)   01/24/25 132 kg (290 lb)   04/15/24 130 kg (286 lb)   04/11/23 120 kg (264 lb)   11/20/20 113 kg (250 lb)       Physical Examination:  Gen: awake, alert, sitting up in chair in no acute distress  HEENT: NCAT, EOMI, anicteric sclerae  CV: RRR, normotensive  Resp: nonlabored on room air, sats appropriate  Abd: soft, nondistended, mild tenderness to palpation in the right upper quadrant  MSK: moves all four, no c/c/e  Neuro: no focal deficits, cranial nerves grossly intact  Psy:  appropriate, cooperative      Data Review     Labs:  CBC  Lab Results   Component Value Date    WBC 11.46 (H) 01/24/2025    HGB 13.4 " 01/24/2025    HCT 39.1 01/24/2025     01/24/2025      BMP  Lab Results   Component Value Date     01/24/2025    K 3.6 01/24/2025     01/24/2025    CO2 25.0 01/24/2025    BUN 9 01/24/2025    CREATININE 0.57 01/24/2025    GLUCOSE 106 (H) 01/24/2025    CALCIUM 9.4 01/24/2025    MG 1.9 01/24/2025      LFTs  Lab Results   Component Value Date    PROTEINTOT 7.2 01/24/2025    ALBUMIN 4.3 01/24/2025    BILITOT 0.2 01/24/2025    ALT 50 (H) 01/24/2025    AST 26 01/24/2025    ALKPHOS 67 01/24/2025      Coag  Lab Results   Component Value Date    PROTIME 13.4 11/04/2020    PTT 28.0 11/04/2020    INR 1.06 11/04/2020      Iron Studies  Lab Results   Component Value Date    IRON 143 12/18/2019    TRANSFERRIN 275 12/18/2019      Urine:  UA  Lab Results   Component Value Date    COLORU Yellow 01/24/2025    CLARITYU Clear 01/24/2025    SPECGRAVUR 1.018 01/24/2025    PHUR 6.5 01/24/2025    PROTEINUA Negative 01/24/2025    GLUCOSEU Negative 01/24/2025    KETONESU Negative 01/24/2025    BILIRUBINUR Negative 01/24/2025    BLOODU Negative 01/24/2025    NITRITEU Negative 01/24/2025    LEUKOCYTESUR Negative 01/24/2025    UROBILINOGEN 0.2 E.U./dL 01/24/2025    WBCUA 0-2 (A) 04/05/2020    RBCUA 3-5 (A) 04/05/2020    BACTERIA None Seen 04/05/2020        Radiology Impressions:  CT Abdomen Pelvis With & Without Contrast    Result Date: 1/24/2025  1. No acute abnormality of the abdomen or pelvis. No evidence of renal or ureteral calculi or obstructive uropathy.  The above study was initially reviewed and reported by [facility]. I do not find any discrepancies.  This report was signed and finalized on 1/24/2025 9:46 AM by Dr. Srinivas Castro MD.          Problem List     Patient Active Problem List   Diagnosis    Fatty liver    Elevated LFTs    Family history of hemochromatosis    Biliary colic       Assessment/Plan   Macy is a 24 y.o. female with history consistent with a diagnosis of biliary colic versus biliary dyskinesia.   She does not have a history of stones or reduced ejection fraction, however, she has not yet had appropriate imaging either.  I will send her for an ultrasound of her gallbladder, and if stones or sludge were found, we will plan to take her to the operating room for robotic cholecystectomy with preoperative indocyanine green injection.  If there is no evidence of stones or sludge, we will plan on a HIDA scan to follow, and if abnormal, would then plan for cholecystectomy.      Smoking cessation: Never smoker-no counseling indicated  BMI: 42.8-discussed options for management with her, including her recent membership at the gym, and workout plan with her coworkers, as well as healthy diet choices, and we did also discuss potential referral to bariatric surgery for nutrition and exercise evaluation, in the future should the patient find that she needs the services.  Med rec complete: yes  VTE: VTE PPX is not indicated.    Time Spent: I spent 45 minutes caring for Macy Hernandez on this date of service. This time includes time, independent of any procedures, spent by me in the following activities: preparing for the clinic visit, reviewing tests, obtaining and/or reviewing a separately obtained history, performing a medically appropriate examination and/or evaluation, counseling and educating the patient/family/caregiver, ordering medications, tests, or procedures, and documenting information in the medical record.     Emil Flores MD  2/6/2025   15:07 CST

## 2025-02-05 NOTE — PROGRESS NOTES
[facility] General Surgery Clinic History and Physical  Macy Hernandez  MRN: [id number]  Age: 24 y.o. female     YOB: 2000    Primary   Problem      Concerns for biliary dyskinesia versus biliary colic    SUBJECTIVE  History  of Presenting Illness   Patient is a 24 y.o. female with pertinent past medical history of hypothyroidism, ovarian cyst status post laparoscopic ovarian cystectomy on the right, generalized anxiety disorder, morbid obesity with BMI of 42.8, presenting for evaluation of recent abdominal pain, nausea, anorexia.  She was recently seen in the emergency department, had a CT scan which was unremarkable, and labs that did not reveal an elevated white blood cell count or LFT elevation.  She describes her pain is generally right upper quadrant, sometimes wraps around her back, and right flank, is associated with consuming fatty foods, although some of the pain is constant all the time.  She does not have reflux symptoms, or burning.  She has not had a severe exacerbation of her pain since that trip to the emergency department.        Past Medical History     Past Medical History:  Past Medical History:   Diagnosis Date    Anxiety     Anxiety Disorder, Generalized    Hypothyroid     Ovarian cyst        PCP: Uriel Bell MD    Past Surgical History:  Past Surgical History:   Procedure Laterality Date    LAPAROSCOPIC OVARIAN CYSTECTOMY Right     WISDOM TOOTH EXTRACTION         Family History:  Family History   Problem Relation Age of Onset    Heart disease Paternal Grandfather     Colon polyps Paternal Grandfather     Heart disease Paternal Grandmother     Heart disease Maternal Grandmother     Heart disease Maternal Grandfather     Heart disease Other     Ovarian cancer Maternal Great-Grandmother     Colon cancer Neg Hx        Social History:  Social History     Tobacco Use    Smoking status: Never     Passive exposure: Never    Smokeless tobacco: Never   Substance Use Topics  "   Alcohol use: No       Allergies:  No Known Allergies    Medications:  Current Outpatient Medications   Medication Instructions    famotidine (PEPCID) 20 mg, Oral, Nightly PRN    FLUoxetine (PROZAC) 40 mg, Oral    levothyroxine (SYNTHROID, LEVOTHROID) 125 mcg, Every Early Morning    LORazepam (ATIVAN) 0.5 mg, Oral    norelgestromin-ethinyl estradiol (ORTHO EVRA) 150-35 MCG/24HR 1 patch, Transdermal, Weekly    ondansetron (ZOFRAN) 4 mg, Oral, Every 8 Hours PRN           Review of Systems   She otherwise denies lightheadedness, dizziness, fainting, passing out, headache, nausea, vomiting, chest pain, palpitations, shortness of breath, coughing, wheezing, heart burn, reflux, skin lesions, unintended weight loss/gain, sensitivity to heat/cold, changes in sensation, changes in vision/hearing/smell/taste, myalgias, arthralgias, and has no other acute complaints or concerning symptoms to report at this time.      Physical Examination     Vitals:    02/06/25 1256   BP: 140/80   Pulse: 103   SpO2: 98%   Weight: 132 kg (290 lb)   Height: 175.3 cm (69\")       Estimated body mass index is 42.83 kg/m² as calculated from the following:    Height as of this encounter: 175.3 cm (69\").    Weight as of this encounter: 132 kg (290 lb).  PREVIOUS WEIGHTS:   Wt Readings from Last 5 Encounters:   02/06/25 132 kg (290 lb)   01/24/25 132 kg (290 lb)   04/15/24 130 kg (286 lb)   04/11/23 120 kg (264 lb)   11/20/20 113 kg (250 lb)       Physical Examination:  Gen: awake, alert, sitting up in chair in no acute distress  HEENT: NCAT, EOMI, anicteric sclerae  CV: RRR, normotensive  Resp: nonlabored on room air, sats appropriate  Abd: soft, nondistended, mild tenderness to palpation in the right upper quadrant  MSK: moves all four, no c/c/e  Neuro: no focal deficits, cranial nerves grossly intact  Psy:  appropriate, cooperative      Data Review     Labs:  CBC  Lab Results   Component Value Date    WBC 11.46 (H) 01/24/2025    HGB 13.4 " 01/24/2025    HCT 39.1 01/24/2025     01/24/2025      BMP  Lab Results   Component Value Date     01/24/2025    K 3.6 01/24/2025     01/24/2025    CO2 25.0 01/24/2025    BUN 9 01/24/2025    CREATININE 0.57 01/24/2025    GLUCOSE 106 (H) 01/24/2025    CALCIUM 9.4 01/24/2025    MG 1.9 01/24/2025      LFTs  Lab Results   Component Value Date    PROTEINTOT 7.2 01/24/2025    ALBUMIN 4.3 01/24/2025    BILITOT 0.2 01/24/2025    ALT 50 (H) 01/24/2025    AST 26 01/24/2025    ALKPHOS 67 01/24/2025      Coag  Lab Results   Component Value Date    PROTIME 13.4 11/04/2020    PTT 28.0 11/04/2020    INR 1.06 11/04/2020      Iron Studies  Lab Results   Component Value Date    IRON 143 12/18/2019    TRANSFERRIN 275 12/18/2019      Urine:  UA  Lab Results   Component Value Date    COLORU Yellow 01/24/2025    CLARITYU Clear 01/24/2025    SPECGRAVUR 1.018 01/24/2025    PHUR 6.5 01/24/2025    PROTEINUA Negative 01/24/2025    GLUCOSEU Negative 01/24/2025    KETONESU Negative 01/24/2025    BILIRUBINUR Negative 01/24/2025    BLOODU Negative 01/24/2025    NITRITEU Negative 01/24/2025    LEUKOCYTESUR Negative 01/24/2025    UROBILINOGEN 0.2 E.U./dL 01/24/2025    WBCUA 0-2 (A) 04/05/2020    RBCUA 3-5 (A) 04/05/2020    BACTERIA None Seen 04/05/2020        Radiology Impressions:  CT Abdomen Pelvis With & Without Contrast    Result Date: 1/24/2025  1. No acute abnormality of the abdomen or pelvis. No evidence of renal or ureteral calculi or obstructive uropathy.  The above study was initially reviewed and reported by [facility]. I do not find any discrepancies.  This report was signed and finalized on 1/24/2025 9:46 AM by Dr. Srinivas Castro MD.          Problem List     Patient Active Problem List   Diagnosis    Fatty liver    Elevated LFTs    Family history of hemochromatosis    Biliary colic       Assessment/Plan   Macy is a 24 y.o. female with history consistent with a diagnosis of biliary colic versus biliary dyskinesia.   She does not have a history of stones or reduced ejection fraction, however, she has not yet had appropriate imaging either.  I will send her for an ultrasound of her gallbladder, and if stones or sludge were found, we will plan to take her to the operating room for robotic cholecystectomy with preoperative indocyanine green injection.  If there is no evidence of stones or sludge, we will plan on a HIDA scan to follow, and if abnormal, would then plan for cholecystectomy.      Smoking cessation: Never smoker-no counseling indicated  BMI: 42.8-discussed options for management with her, including her recent membership at the gym, and workout plan with her coworkers, as well as healthy diet choices, and we did also discuss potential referral to bariatric surgery for nutrition and exercise evaluation, in the future should the patient find that she needs the services.  Med rec complete: yes  VTE: VTE PPX is not indicated.    Time Spent: I spent 45 minutes caring for Macy Hernandez on this date of service. This time includes time, independent of any procedures, spent by me in the following activities: preparing for the clinic visit, reviewing tests, obtaining and/or reviewing a separately obtained history, performing a medically appropriate examination and/or evaluation, counseling and educating the patient/family/caregiver, ordering medications, tests, or procedures, and documenting information in the medical record.     Emil Flores MD  2/6/2025   15:07 CST

## 2025-02-06 ENCOUNTER — PATIENT ROUNDING (BHMG ONLY) (OUTPATIENT)
Dept: SURGERY | Facility: CLINIC | Age: 25
End: 2025-02-06
Payer: COMMERCIAL

## 2025-02-06 ENCOUNTER — OFFICE VISIT (OUTPATIENT)
Dept: SURGERY | Facility: CLINIC | Age: 25
End: 2025-02-06
Payer: COMMERCIAL

## 2025-02-06 VITALS
HEART RATE: 103 BPM | DIASTOLIC BLOOD PRESSURE: 80 MMHG | WEIGHT: 290 LBS | BODY MASS INDEX: 42.95 KG/M2 | SYSTOLIC BLOOD PRESSURE: 140 MMHG | OXYGEN SATURATION: 98 % | HEIGHT: 69 IN

## 2025-02-06 DIAGNOSIS — K80.50 BILIARY COLIC: Primary | ICD-10-CM

## 2025-02-06 NOTE — PATIENT INSTRUCTIONS
General Surgery Clinic Discharge Instructions      Macy MENDEZ David  02/06/25      Diagnosis: Concerns for biliary colic, abdominal pain, nausea, anorexia    Medications/Treatments:   (Take all medications as prescribed by your provider)  We will send you for an ultrasound of your gallbladder to assess for stones/sludge; if we are unable to identify pathology on the ultrasound, we will likely send you for a HIDA scan afterward    Surgical Plan: Pending above      Follow-up: Pending above      Please call our office at [phone] with any issues, questions, or concerns.      Emil Flores MD  2/6/2025   13:27 CST

## 2025-02-06 NOTE — LETTER
February 6, 2025     Uriel Bell MD  [address] [address]    Patient: Macy Hernandez   YOB: 2000   Date of Visit: 2/6/2025     Dear Uriel Bell MD:      I have seen Macy Hernandez in my office today for evaluation of her recent abdominal pain, nausea, anorexia.  I agree with your assessment, I believe her recent history is consistent with either biliary colic or biliary dyskinesia.  I am going to send her for an ultrasound of her gallbladder, and if it reveals stones or sludge, we will plan to take her to the operating room for robotic cholecystectomy in the near future.  If she has an absence of stones or sludge, I will plan on getting a HIDA scan prior to any surgical intervention.    Should you have any questions or concerns about her care, please feel free to reach out.  Thank you very much for this referral, and for involving me in Macy 's care.               Sincerely,        Emil Flores MD        CC: Emil Robbins MD  02/06/25 1557  Sign when Signing Visit    [facility] History and Physical  Macy Hernandez  MRN: [id number]  Age: 24 y.o. female     YOB: 2000    Primary   Problem      Concerns for biliary dyskinesia versus biliary colic    SUBJECTIVE  History  of Presenting Illness   Patient is a 24 y.o. female with pertinent past medical history of hypothyroidism, ovarian cyst status post laparoscopic ovarian cystectomy on the right, generalized anxiety disorder, morbid obesity with BMI of 42.8, presenting for evaluation of recent abdominal pain, nausea, anorexia.  She was recently seen in the emergency department, had a CT scan which was unremarkable, and labs that did not reveal an elevated white blood cell count or LFT elevation.  She describes her pain is generally right upper quadrant, sometimes wraps around her back, and right flank, is associated with consuming fatty foods, although some  of the pain is constant all the time.  She does not have reflux symptoms, or burning.  She has not had a severe exacerbation of her pain since that trip to the emergency department.        Past Medical History     Past Medical History:  Past Medical History:   Diagnosis Date   • Anxiety     Anxiety Disorder, Generalized   • Hypothyroid    • Ovarian cyst        PCP: Uriel Bell MD    Past Surgical History:  Past Surgical History:   Procedure Laterality Date   • LAPAROSCOPIC OVARIAN CYSTECTOMY Right    • WISDOM TOOTH EXTRACTION         Family History:  Family History   Problem Relation Age of Onset   • Heart disease Paternal Grandfather    • Colon polyps Paternal Grandfather    • Heart disease Paternal Grandmother    • Heart disease Maternal Grandmother    • Heart disease Maternal Grandfather    • Heart disease Other    • Ovarian cancer Maternal Great-Grandmother    • Colon cancer Neg Hx        Social History:  Social History     Tobacco Use   • Smoking status: Never     Passive exposure: Never   • Smokeless tobacco: Never   Substance Use Topics   • Alcohol use: No       Allergies:  No Known Allergies    Medications:  Current Outpatient Medications   Medication Instructions   • famotidine (PEPCID) 20 mg, Oral, Nightly PRN   • FLUoxetine (PROZAC) 40 mg, Oral   • levothyroxine (SYNTHROID, LEVOTHROID) 125 mcg, Every Early Morning   • LORazepam (ATIVAN) 0.5 mg, Oral   • norelgestromin-ethinyl estradiol (ORTHO EVRA) 150-35 MCG/24HR 1 patch, Transdermal, Weekly   • ondansetron (ZOFRAN) 4 mg, Oral, Every 8 Hours PRN           Review of Systems   She otherwise denies lightheadedness, dizziness, fainting, passing out, headache, nausea, vomiting, chest pain, palpitations, shortness of breath, coughing, wheezing, heart burn, reflux, skin lesions, unintended weight loss/gain, sensitivity to heat/cold, changes in sensation, changes in vision/hearing/smell/taste, myalgias, arthralgias, and has no other acute complaints or  "concerning symptoms to report at this time.      Physical Examination     Vitals:    02/06/25 1256   BP: 140/80   Pulse: 103   SpO2: 98%   Weight: 132 kg (290 lb)   Height: 175.3 cm (69\")       Estimated body mass index is 42.83 kg/m² as calculated from the following:    Height as of this encounter: 175.3 cm (69\").    Weight as of this encounter: 132 kg (290 lb).  PREVIOUS WEIGHTS:   Wt Readings from Last 5 Encounters:   02/06/25 132 kg (290 lb)   01/24/25 132 kg (290 lb)   04/15/24 130 kg (286 lb)   04/11/23 120 kg (264 lb)   11/20/20 113 kg (250 lb)       Physical Examination:  Gen: awake, alert, sitting up in chair in no acute distress  HEENT: NCAT, EOMI, anicteric sclerae  CV: RRR, normotensive  Resp: nonlabored on room air, sats appropriate  Abd: soft, nondistended, mild tenderness to palpation in the right upper quadrant  MSK: moves all four, no c/c/e  Neuro: no focal deficits, cranial nerves grossly intact  Psy:  appropriate, cooperative      Data Review     Labs:  CBC  Lab Results   Component Value Date    WBC 11.46 (H) 01/24/2025    HGB 13.4 01/24/2025    HCT 39.1 01/24/2025     01/24/2025      BMP  Lab Results   Component Value Date     01/24/2025    K 3.6 01/24/2025     01/24/2025    CO2 25.0 01/24/2025    BUN 9 01/24/2025    CREATININE 0.57 01/24/2025    GLUCOSE 106 (H) 01/24/2025    CALCIUM 9.4 01/24/2025    MG 1.9 01/24/2025      LFTs  Lab Results   Component Value Date    PROTEINTOT 7.2 01/24/2025    ALBUMIN 4.3 01/24/2025    BILITOT 0.2 01/24/2025    ALT 50 (H) 01/24/2025    AST 26 01/24/2025    ALKPHOS 67 01/24/2025      Coag  Lab Results   Component Value Date    PROTIME 13.4 11/04/2020    PTT 28.0 11/04/2020    INR 1.06 11/04/2020      Iron Studies  Lab Results   Component Value Date    IRON 143 12/18/2019    TRANSFERRIN 275 12/18/2019      Urine:  UA  Lab Results   Component Value Date    COLORU Yellow 01/24/2025    CLARITYU Clear 01/24/2025    SPECGRAVUR 1.018 01/24/2025    " PHUR 6.5 01/24/2025    PROTEINUA Negative 01/24/2025    GLUCOSEU Negative 01/24/2025    KETONESU Negative 01/24/2025    BILIRUBINUR Negative 01/24/2025    BLOODU Negative 01/24/2025    NITRITEU Negative 01/24/2025    LEUKOCYTESUR Negative 01/24/2025    UROBILINOGEN 0.2 E.U./dL 01/24/2025    WBCUA 0-2 (A) 04/05/2020    RBCUA 3-5 (A) 04/05/2020    BACTERIA None Seen 04/05/2020        Radiology Impressions:  CT Abdomen Pelvis With & Without Contrast    Result Date: 1/24/2025  1. No acute abnormality of the abdomen or pelvis. No evidence of renal or ureteral calculi or obstructive uropathy.  The above study was initially reviewed and reported by [facility]. I do not find any discrepancies.  This report was signed and finalized on 1/24/2025 9:46 AM by Dr. Srinivas Castro MD.          Problem List     Patient Active Problem List   Diagnosis   • Fatty liver   • Elevated LFTs   • Family history of hemochromatosis   • Biliary colic       Assessment/Plan   Macy is a 24 y.o. female with history consistent with a diagnosis of biliary colic versus biliary dyskinesia.  She does not have a history of stones or reduced ejection fraction, however, she has not yet had appropriate imaging either.  I will send her for an ultrasound of her gallbladder, and if stones or sludge were found, we will plan to take her to the operating room for robotic cholecystectomy with preoperative indocyanine green injection.  If there is no evidence of stones or sludge, we will plan on a HIDA scan to follow, and if abnormal, would then plan for cholecystectomy.      Smoking cessation: Never smoker-no counseling indicated  BMI: 42.8-discussed options for management with her, including her recent membership at the gym, and workout plan with her coworkers, as well as healthy diet choices, and we did also discuss potential referral to bariatric surgery for nutrition and exercise evaluation, in the future should the patient find that she needs the  services.  Med rec complete: yes  VTE: VTE PPX is not indicated.    Time Spent: I spent 45 minutes caring for Macy Hernandez on this date of service. This time includes time, independent of any procedures, spent by me in the following activities: preparing for the clinic visit, reviewing tests, obtaining and/or reviewing a separately obtained history, performing a medically appropriate examination and/or evaluation, counseling and educating the patient/family/caregiver, ordering medications, tests, or procedures, and documenting information in the medical record.     Emil Flores MD  2/6/2025   15:07 CST

## 2025-02-06 NOTE — PROGRESS NOTES
[facility]     [facility]   2/6/25

## 2025-02-12 ENCOUNTER — HOSPITAL ENCOUNTER (OUTPATIENT)
Dept: ULTRASOUND IMAGING | Facility: HOSPITAL | Age: 25
Discharge: HOME | End: 2025-02-12
Admitting: STUDENT IN AN ORGANIZED HEALTH CARE EDUCATION/TRAINING PROGRAM
Payer: COMMERCIAL

## 2025-02-12 DIAGNOSIS — K80.50 BILIARY COLIC: ICD-10-CM

## 2025-02-12 DIAGNOSIS — K80.50 BILIARY COLIC: Primary | ICD-10-CM

## 2025-02-12 PROCEDURE — 76705 ECHO EXAM OF ABDOMEN: CPT

## 2025-02-27 ENCOUNTER — HOSPITAL ENCOUNTER (OUTPATIENT)
Dept: NUCLEAR MEDICINE | Facility: HOSPITAL | Age: 25
Discharge: HOME | End: 2025-02-27
Payer: COMMERCIAL

## 2025-02-27 DIAGNOSIS — K80.50 BILIARY COLIC: ICD-10-CM

## 2025-02-27 PROCEDURE — 78226 HEPATOBILIARY SYSTEM IMAGING: CPT

## 2025-02-27 PROCEDURE — A9537 TC99M MEBROFENIN: HCPCS | Performed by: STUDENT IN AN ORGANIZED HEALTH CARE EDUCATION/TRAINING PROGRAM

## 2025-02-27 PROCEDURE — 34310000005 TECHNETIUM TC 99M MEBROFENIN KIT: Performed by: STUDENT IN AN ORGANIZED HEALTH CARE EDUCATION/TRAINING PROGRAM

## 2025-02-27 RX ORDER — KIT FOR THE PREPARATION OF TECHNETIUM TC 99M MEBROFENIN 45 MG/10ML
1 INJECTION, POWDER, LYOPHILIZED, FOR SOLUTION INTRAVENOUS
Status: COMPLETED | OUTPATIENT
Start: 2025-02-27 | End: 2025-02-27

## 2025-02-27 RX ADMIN — MEBROFENIN 1 DOSE: 45 INJECTION, POWDER, LYOPHILIZED, FOR SOLUTION INTRAVENOUS at 13:00

## 2025-02-28 ENCOUNTER — TELEPHONE (OUTPATIENT)
Dept: SURGERY | Facility: CLINIC | Age: 25
End: 2025-02-28
Payer: COMMERCIAL

## 2025-02-28 NOTE — TELEPHONE ENCOUNTER
Macy MENDEZ David is scheduled for surgery with Dr. Flores on 03/05/25 with an arrival time of 7:30.  You will check in at the main registration desk.   On the day of surgery you will need a .   We ask that you do not eat or drink anything after midnight on the day of surgery.   Please do not take any anti-inflammatory or weight loss medications, vitamins, ibuprofen, aleve, Advil, motrin, Excedrin, mobic, meloxicam for 7 days prior to surgery.   __________________________________________________________________  Your pre-work appointment will be on 03/03/25 arrive at 3:30, you will check in at main registration.   For your pre-work appointment you do not have to fast.   For pre-work you do not need a . You will get some lab work and sign consent for surgery.   You may also get an EKG and/or chest xray if needed.     If you have any questions do not hesitate to reach out.   You can reach us at [phone] hit option 2.       Patient voiced confirmation and understanding of surgery details.    CBC  02/28

## 2025-03-03 ENCOUNTER — PRE-ADMISSION TESTING (OUTPATIENT)
Dept: PREADMISSION TESTING | Facility: HOSPITAL | Age: 25
End: 2025-03-03
Payer: COMMERCIAL

## 2025-03-03 ENCOUNTER — HOSPITAL ENCOUNTER (OUTPATIENT)
Dept: GENERAL RADIOLOGY | Facility: HOSPITAL | Age: 25
Discharge: HOME | End: 2025-03-03
Payer: COMMERCIAL

## 2025-03-03 VITALS
SYSTOLIC BLOOD PRESSURE: 144 MMHG | HEIGHT: 72 IN | HEART RATE: 94 BPM | OXYGEN SATURATION: 98 % | BODY MASS INDEX: 39 KG/M2 | WEIGHT: 287.92 LBS | RESPIRATION RATE: 18 BRPM | DIASTOLIC BLOOD PRESSURE: 95 MMHG

## 2025-03-03 DIAGNOSIS — K80.50 BILIARY COLIC: ICD-10-CM

## 2025-03-03 LAB
DEPRECATED RDW RBC AUTO: 39.7 FL (ref 37–54)
ERYTHROCYTE [DISTWIDTH] IN BLOOD BY AUTOMATED COUNT: 12.1 % (ref 12.3–15.4)
HCT VFR BLD AUTO: 40.3 % (ref 34–46.6)
HGB BLD-MCNC: 14 G/DL (ref 12–15.9)
MCH RBC QN AUTO: 31 PG (ref 26.6–33)
MCHC RBC AUTO-ENTMCNC: 34.7 G/DL (ref 31.5–35.7)
MCV RBC AUTO: 89.2 FL (ref 79–97)
PLATELET # BLD AUTO: 235 10*3/MM3 (ref 140–450)
PMV BLD AUTO: 12 FL (ref 6–12)
RBC # BLD AUTO: 4.52 10*6/MM3 (ref 3.77–5.28)
WBC NRBC COR # BLD AUTO: 7.83 10*3/MM3 (ref 3.4–10.8)

## 2025-03-03 PROCEDURE — 36415 COLL VENOUS BLD VENIPUNCTURE: CPT

## 2025-03-03 PROCEDURE — 71045 X-RAY EXAM CHEST 1 VIEW: CPT

## 2025-03-03 PROCEDURE — 85027 COMPLETE CBC AUTOMATED: CPT

## 2025-03-03 RX ORDER — HYOSCYAMINE SULFATE 0.12 MG/1
TABLET SUBLINGUAL
COMMUNITY

## 2025-03-03 NOTE — DISCHARGE INSTRUCTIONS

## 2025-03-04 ENCOUNTER — TELEPHONE (OUTPATIENT)
Dept: SURGERY | Facility: CLINIC | Age: 25
End: 2025-03-04
Payer: COMMERCIAL

## 2025-03-04 NOTE — TELEPHONE ENCOUNTER
Patient confirmed arrival time of 6:45 am for surgery in the morning. Voiced understanding of nothing to eat or drink after midnight tonight and to check in at main registration in the morning.    CBC  03/04

## 2025-03-05 ENCOUNTER — ANESTHESIA EVENT (OUTPATIENT)
Dept: PERIOP | Facility: HOSPITAL | Age: 25
End: 2025-03-05
Payer: COMMERCIAL

## 2025-03-05 ENCOUNTER — ANESTHESIA (OUTPATIENT)
Dept: PERIOP | Facility: HOSPITAL | Age: 25
End: 2025-03-05
Payer: COMMERCIAL

## 2025-03-05 ENCOUNTER — HOSPITAL ENCOUNTER (OUTPATIENT)
Facility: HOSPITAL | Age: 25
Setting detail: HOSPITAL OUTPATIENT SURGERY
Discharge: HOME | End: 2025-03-05
Attending: STUDENT IN AN ORGANIZED HEALTH CARE EDUCATION/TRAINING PROGRAM | Admitting: STUDENT IN AN ORGANIZED HEALTH CARE EDUCATION/TRAINING PROGRAM
Payer: COMMERCIAL

## 2025-03-05 VITALS
SYSTOLIC BLOOD PRESSURE: 151 MMHG | OXYGEN SATURATION: 96 % | RESPIRATION RATE: 18 BRPM | HEART RATE: 81 BPM | DIASTOLIC BLOOD PRESSURE: 106 MMHG | TEMPERATURE: 97.6 F

## 2025-03-05 DIAGNOSIS — K82.8 BILIARY DYSKINESIA: Primary | ICD-10-CM

## 2025-03-05 DIAGNOSIS — K80.50 BILIARY COLIC: ICD-10-CM

## 2025-03-05 LAB — B-HCG UR QL: NEGATIVE

## 2025-03-05 PROCEDURE — 25010000002 INDOCYANINE GREEN 25 MG RECONSTITUTED SOLUTION: Performed by: STUDENT IN AN ORGANIZED HEALTH CARE EDUCATION/TRAINING PROGRAM

## 2025-03-05 PROCEDURE — 25010000002 FENTANYL CITRATE (PF) 50 MCG/ML SOLUTION: Performed by: ANESTHESIOLOGY

## 2025-03-05 PROCEDURE — 25010000002 ONDANSETRON PER 1 MG: Performed by: ANESTHESIOLOGY

## 2025-03-05 PROCEDURE — 25010000002 BUPIVACAINE 0.5 % SOLUTION 50 ML VIAL: Performed by: STUDENT IN AN ORGANIZED HEALTH CARE EDUCATION/TRAINING PROGRAM

## 2025-03-05 PROCEDURE — 25010000002 MORPHINE SULFATE (PF) 2 MG/ML SOLUTION 1 ML CARTRIDGE: Performed by: STUDENT IN AN ORGANIZED HEALTH CARE EDUCATION/TRAINING PROGRAM

## 2025-03-05 PROCEDURE — 25010000002 DEXAMETHASONE PER 1 MG: Performed by: ANESTHESIOLOGY

## 2025-03-05 PROCEDURE — 25010000002 LIDOCAINE 1% - EPINEPHRINE 1:100000 1 %-1:100000 SOLUTION 20 ML VIAL: Performed by: STUDENT IN AN ORGANIZED HEALTH CARE EDUCATION/TRAINING PROGRAM

## 2025-03-05 PROCEDURE — 25010000002 MIDAZOLAM PER 1MG: Performed by: ANESTHESIOLOGY

## 2025-03-05 PROCEDURE — 25010000002 BUPIVACAINE (PF) 0.25 % SOLUTION 30 ML VIAL: Performed by: STUDENT IN AN ORGANIZED HEALTH CARE EDUCATION/TRAINING PROGRAM

## 2025-03-05 PROCEDURE — 47562 LAPAROSCOPIC CHOLECYSTECTOMY: CPT | Performed by: STUDENT IN AN ORGANIZED HEALTH CARE EDUCATION/TRAINING PROGRAM

## 2025-03-05 PROCEDURE — 25010000002 CEFAZOLIN 3 G RECONSTITUTED SOLUTION 1 EACH VIAL: Performed by: STUDENT IN AN ORGANIZED HEALTH CARE EDUCATION/TRAINING PROGRAM

## 2025-03-05 PROCEDURE — 25010000002 LIDOCAINE PF 2% 2 % SOLUTION: Performed by: NURSE ANESTHETIST, CERTIFIED REGISTERED

## 2025-03-05 PROCEDURE — 88304 TISSUE EXAM BY PATHOLOGIST: CPT | Performed by: STUDENT IN AN ORGANIZED HEALTH CARE EDUCATION/TRAINING PROGRAM

## 2025-03-05 PROCEDURE — 25010000002 DEXAMETHASONE PER 1 MG: Performed by: STUDENT IN AN ORGANIZED HEALTH CARE EDUCATION/TRAINING PROGRAM

## 2025-03-05 PROCEDURE — 25010000002 ONDANSETRON PER 1 MG: Performed by: NURSE ANESTHETIST, CERTIFIED REGISTERED

## 2025-03-05 PROCEDURE — 25010000002 LIDOCAINE 1 % SOLUTION 20 ML VIAL: Performed by: STUDENT IN AN ORGANIZED HEALTH CARE EDUCATION/TRAINING PROGRAM

## 2025-03-05 PROCEDURE — 25010000002 DEXAMETHASONE PER 1 MG: Performed by: NURSE ANESTHETIST, CERTIFIED REGISTERED

## 2025-03-05 PROCEDURE — 25010000002 SUGAMMADEX 200 MG/2ML SOLUTION: Performed by: NURSE ANESTHETIST, CERTIFIED REGISTERED

## 2025-03-05 PROCEDURE — 25810000003 LACTATED RINGERS PER 1000 ML: Performed by: STUDENT IN AN ORGANIZED HEALTH CARE EDUCATION/TRAINING PROGRAM

## 2025-03-05 PROCEDURE — 81025 URINE PREGNANCY TEST: CPT | Performed by: STUDENT IN AN ORGANIZED HEALTH CARE EDUCATION/TRAINING PROGRAM

## 2025-03-05 PROCEDURE — 25010000002 PROPOFOL 10 MG/ML EMULSION: Performed by: NURSE ANESTHETIST, CERTIFIED REGISTERED

## 2025-03-05 PROCEDURE — 25010000002 HEPARIN (PORCINE) PER 1000 UNITS: Performed by: STUDENT IN AN ORGANIZED HEALTH CARE EDUCATION/TRAINING PROGRAM

## 2025-03-05 DEVICE — LARGE LIGATION CLIPS 6 CLIPS/CART
Type: IMPLANTABLE DEVICE | Site: ABDOMEN | Status: FUNCTIONAL
Brand: VAS-Q-CLIP

## 2025-03-05 DEVICE — HEMOST ABS SURGICEL PWDR 3GM: Type: IMPLANTABLE DEVICE | Site: ABDOMEN | Status: FUNCTIONAL

## 2025-03-05 RX ORDER — ONDANSETRON 2 MG/ML
INJECTION INTRAMUSCULAR; INTRAVENOUS AS NEEDED
Status: DISCONTINUED | OUTPATIENT
Start: 2025-03-05 | End: 2025-03-05 | Stop reason: SURG

## 2025-03-05 RX ORDER — SODIUM CHLORIDE 0.9 % (FLUSH) 0.9 %
10 SYRINGE (ML) INJECTION AS NEEDED
Status: DISCONTINUED | OUTPATIENT
Start: 2025-03-05 | End: 2025-03-05 | Stop reason: HOSPADM

## 2025-03-05 RX ORDER — ONDANSETRON 2 MG/ML
4 INJECTION INTRAMUSCULAR; INTRAVENOUS EVERY 6 HOURS PRN
Status: DISCONTINUED | OUTPATIENT
Start: 2025-03-05 | End: 2025-03-05 | Stop reason: HOSPADM

## 2025-03-05 RX ORDER — MIDAZOLAM HYDROCHLORIDE 2 MG/2ML
2 INJECTION, SOLUTION INTRAMUSCULAR; INTRAVENOUS ONCE
Status: COMPLETED | OUTPATIENT
Start: 2025-03-05 | End: 2025-03-05

## 2025-03-05 RX ORDER — IBUPROFEN 600 MG/1
600 TABLET, FILM COATED ORAL EVERY 6 HOURS PRN
Status: DISCONTINUED | OUTPATIENT
Start: 2025-03-05 | End: 2025-03-05 | Stop reason: HOSPADM

## 2025-03-05 RX ORDER — HEPARIN SODIUM 5000 [USP'U]/ML
5000 INJECTION, SOLUTION INTRAVENOUS; SUBCUTANEOUS ONCE
Status: COMPLETED | OUTPATIENT
Start: 2025-03-05 | End: 2025-03-05

## 2025-03-05 RX ORDER — ROCURONIUM BROMIDE 10 MG/ML
INJECTION, SOLUTION INTRAVENOUS AS NEEDED
Status: DISCONTINUED | OUTPATIENT
Start: 2025-03-05 | End: 2025-03-05 | Stop reason: SURG

## 2025-03-05 RX ORDER — FLUMAZENIL 0.1 MG/ML
0.2 INJECTION INTRAVENOUS AS NEEDED
Status: DISCONTINUED | OUTPATIENT
Start: 2025-03-05 | End: 2025-03-05 | Stop reason: HOSPADM

## 2025-03-05 RX ORDER — DEXTROSE MONOHYDRATE 25 G/50ML
12.5 INJECTION, SOLUTION INTRAVENOUS AS NEEDED
Status: DISCONTINUED | OUTPATIENT
Start: 2025-03-05 | End: 2025-03-05 | Stop reason: HOSPADM

## 2025-03-05 RX ORDER — SODIUM CHLORIDE 0.9 % (FLUSH) 0.9 %
10 SYRINGE (ML) INJECTION EVERY 12 HOURS SCHEDULED
Status: DISCONTINUED | OUTPATIENT
Start: 2025-03-05 | End: 2025-03-05 | Stop reason: HOSPADM

## 2025-03-05 RX ORDER — LABETALOL HYDROCHLORIDE 5 MG/ML
5 INJECTION, SOLUTION INTRAVENOUS
Status: DISCONTINUED | OUTPATIENT
Start: 2025-03-05 | End: 2025-03-05 | Stop reason: HOSPADM

## 2025-03-05 RX ORDER — DEXAMETHASONE SODIUM PHOSPHATE 4 MG/ML
INJECTION, SOLUTION INTRA-ARTICULAR; INTRALESIONAL; INTRAMUSCULAR; INTRAVENOUS; SOFT TISSUE AS NEEDED
Status: DISCONTINUED | OUTPATIENT
Start: 2025-03-05 | End: 2025-03-05 | Stop reason: SURG

## 2025-03-05 RX ORDER — HYDROCODONE BITARTRATE AND ACETAMINOPHEN 7.5; 325 MG/1; MG/1
1 TABLET ORAL ONCE AS NEEDED
Status: DISCONTINUED | OUTPATIENT
Start: 2025-03-05 | End: 2025-03-05 | Stop reason: HOSPADM

## 2025-03-05 RX ORDER — ONDANSETRON 4 MG/1
4 TABLET, ORALLY DISINTEGRATING ORAL EVERY 8 HOURS PRN
Qty: 4 TABLET | Refills: 0 | Status: SHIPPED | OUTPATIENT
Start: 2025-03-05

## 2025-03-05 RX ORDER — SODIUM CHLORIDE 9 MG/ML
40 INJECTION, SOLUTION INTRAVENOUS AS NEEDED
Status: DISCONTINUED | OUTPATIENT
Start: 2025-03-05 | End: 2025-03-05 | Stop reason: HOSPADM

## 2025-03-05 RX ORDER — PROPOFOL 10 MG/ML
VIAL (ML) INTRAVENOUS AS NEEDED
Status: DISCONTINUED | OUTPATIENT
Start: 2025-03-05 | End: 2025-03-05 | Stop reason: SURG

## 2025-03-05 RX ORDER — SODIUM CHLORIDE, SODIUM LACTATE, POTASSIUM CHLORIDE, CALCIUM CHLORIDE 600; 310; 30; 20 MG/100ML; MG/100ML; MG/100ML; MG/100ML
1000 INJECTION, SOLUTION INTRAVENOUS CONTINUOUS
Status: DISCONTINUED | OUTPATIENT
Start: 2025-03-05 | End: 2025-03-05 | Stop reason: HOSPADM

## 2025-03-05 RX ORDER — OXYCODONE HYDROCHLORIDE 5 MG/1
5 TABLET ORAL EVERY 6 HOURS PRN
Qty: 12 TABLET | Refills: 0 | Status: SHIPPED | OUTPATIENT
Start: 2025-03-05

## 2025-03-05 RX ORDER — SODIUM CHLORIDE 0.9 % (FLUSH) 0.9 %
3 SYRINGE (ML) INJECTION AS NEEDED
Status: DISCONTINUED | OUTPATIENT
Start: 2025-03-05 | End: 2025-03-05 | Stop reason: HOSPADM

## 2025-03-05 RX ORDER — DOCUSATE SODIUM 250 MG
250 CAPSULE ORAL 2 TIMES DAILY PRN
Qty: 90 CAPSULE | Refills: 0 | Status: SHIPPED | OUTPATIENT
Start: 2025-03-05

## 2025-03-05 RX ORDER — MAGNESIUM HYDROXIDE 1200 MG/15ML
LIQUID ORAL AS NEEDED
Status: DISCONTINUED | OUTPATIENT
Start: 2025-03-05 | End: 2025-03-05 | Stop reason: HOSPADM

## 2025-03-05 RX ORDER — SCOPOLAMINE 1 MG/3D
1 PATCH, EXTENDED RELEASE TRANSDERMAL ONCE
Status: DISCONTINUED | OUTPATIENT
Start: 2025-03-05 | End: 2025-03-05 | Stop reason: HOSPADM

## 2025-03-05 RX ORDER — NALOXONE HCL 0.4 MG/ML
0.4 VIAL (ML) INJECTION AS NEEDED
Status: DISCONTINUED | OUTPATIENT
Start: 2025-03-05 | End: 2025-03-05 | Stop reason: HOSPADM

## 2025-03-05 RX ORDER — FENTANYL CITRATE 50 UG/ML
25 INJECTION, SOLUTION INTRAMUSCULAR; INTRAVENOUS
Status: DISCONTINUED | OUTPATIENT
Start: 2025-03-05 | End: 2025-03-05 | Stop reason: HOSPADM

## 2025-03-05 RX ORDER — PHENYLEPHRINE HCL IN 0.9% NACL 1 MG/10 ML
SYRINGE (ML) INTRAVENOUS AS NEEDED
Status: DISCONTINUED | OUTPATIENT
Start: 2025-03-05 | End: 2025-03-05 | Stop reason: SURG

## 2025-03-05 RX ORDER — SODIUM CHLORIDE, SODIUM LACTATE, POTASSIUM CHLORIDE, CALCIUM CHLORIDE 600; 310; 30; 20 MG/100ML; MG/100ML; MG/100ML; MG/100ML
75 INJECTION, SOLUTION INTRAVENOUS CONTINUOUS
Status: DISPENSED | OUTPATIENT
Start: 2025-03-05 | End: 2025-03-05

## 2025-03-05 RX ORDER — ONDANSETRON 2 MG/ML
4 INJECTION INTRAMUSCULAR; INTRAVENOUS ONCE AS NEEDED
Status: COMPLETED | OUTPATIENT
Start: 2025-03-05 | End: 2025-03-05

## 2025-03-05 RX ORDER — DEXAMETHASONE SODIUM PHOSPHATE 4 MG/ML
4 INJECTION, SOLUTION INTRA-ARTICULAR; INTRALESIONAL; INTRAMUSCULAR; INTRAVENOUS; SOFT TISSUE ONCE AS NEEDED
Status: COMPLETED | OUTPATIENT
Start: 2025-03-05 | End: 2025-03-05

## 2025-03-05 RX ORDER — HYDROCODONE BITARTRATE AND ACETAMINOPHEN 5; 325 MG/1; MG/1
1 TABLET ORAL EVERY 4 HOURS PRN
Status: DISCONTINUED | OUTPATIENT
Start: 2025-03-05 | End: 2025-03-05 | Stop reason: HOSPADM

## 2025-03-05 RX ORDER — INDOCYANINE GREEN AND WATER 25 MG
2.5 KIT INJECTION ONCE
Status: COMPLETED | OUTPATIENT
Start: 2025-03-05 | End: 2025-03-05

## 2025-03-05 RX ORDER — HYDROCODONE BITARTRATE AND ACETAMINOPHEN 10; 325 MG/1; MG/1
1 TABLET ORAL EVERY 4 HOURS PRN
Status: DISCONTINUED | OUTPATIENT
Start: 2025-03-05 | End: 2025-03-05 | Stop reason: HOSPADM

## 2025-03-05 RX ORDER — LIDOCAINE HYDROCHLORIDE 20 MG/ML
INJECTION, SOLUTION EPIDURAL; INFILTRATION; INTRACAUDAL; PERINEURAL AS NEEDED
Status: DISCONTINUED | OUTPATIENT
Start: 2025-03-05 | End: 2025-03-05 | Stop reason: SURG

## 2025-03-05 RX ORDER — FENTANYL CITRATE 50 UG/ML
50 INJECTION, SOLUTION INTRAMUSCULAR; INTRAVENOUS
Status: COMPLETED | OUTPATIENT
Start: 2025-03-05 | End: 2025-03-05

## 2025-03-05 RX ORDER — LIDOCAINE HYDROCHLORIDE 10 MG/ML
0.5 INJECTION, SOLUTION EPIDURAL; INFILTRATION; INTRACAUDAL; PERINEURAL ONCE AS NEEDED
Status: DISCONTINUED | OUTPATIENT
Start: 2025-03-05 | End: 2025-03-05 | Stop reason: HOSPADM

## 2025-03-05 RX ADMIN — FENTANYL CITRATE 50 MCG: 50 INJECTION, SOLUTION INTRAMUSCULAR; INTRAVENOUS at 12:33

## 2025-03-05 RX ADMIN — LIDOCAINE HYDROCHLORIDE 100 MG: 20 INJECTION, SOLUTION EPIDURAL; INFILTRATION; INTRACAUDAL; PERINEURAL at 10:57

## 2025-03-05 RX ADMIN — DEXAMETHASONE SODIUM PHOSPHATE 4 MG: 4 INJECTION, SOLUTION INTRA-ARTICULAR; INTRALESIONAL; INTRAMUSCULAR; INTRAVENOUS; SOFT TISSUE at 11:00

## 2025-03-05 RX ADMIN — HEPARIN SODIUM 5000 UNITS: 5000 INJECTION, SOLUTION INTRAVENOUS; SUBCUTANEOUS at 10:06

## 2025-03-05 RX ADMIN — PROPOFOL 100 MG: 10 INJECTION, EMULSION INTRAVENOUS at 11:06

## 2025-03-05 RX ADMIN — Medication 100 MCG: at 11:39

## 2025-03-05 RX ADMIN — MIDAZOLAM HYDROCHLORIDE 2 MG: 1 INJECTION, SOLUTION INTRAMUSCULAR; INTRAVENOUS at 10:22

## 2025-03-05 RX ADMIN — HYDROCODONE BITARTRATE AND ACETAMINOPHEN 1 TABLET: 10; 325 TABLET ORAL at 12:47

## 2025-03-05 RX ADMIN — ROCURONIUM 50 MG: 50 INJECTION, SOLUTION INTRAVENOUS at 10:57

## 2025-03-05 RX ADMIN — ONDANSETRON 4 MG: 2 INJECTION INTRAMUSCULAR; INTRAVENOUS at 11:00

## 2025-03-05 RX ADMIN — ROCURONIUM 20 MG: 50 INJECTION, SOLUTION INTRAVENOUS at 11:45

## 2025-03-05 RX ADMIN — SODIUM CHLORIDE 3 G: 900 INJECTION INTRAVENOUS at 10:55

## 2025-03-05 RX ADMIN — PROPOFOL 200 MG: 10 INJECTION, EMULSION INTRAVENOUS at 10:57

## 2025-03-05 RX ADMIN — SUGAMMADEX 200 MG: 100 INJECTION, SOLUTION INTRAVENOUS at 12:14

## 2025-03-05 RX ADMIN — FENTANYL CITRATE 100 MCG: 50 INJECTION, SOLUTION INTRAMUSCULAR; INTRAVENOUS at 10:57

## 2025-03-05 RX ADMIN — SODIUM CHLORIDE, POTASSIUM CHLORIDE, SODIUM LACTATE AND CALCIUM CHLORIDE 75 ML/HR: 600; 310; 30; 20 INJECTION, SOLUTION INTRAVENOUS at 08:37

## 2025-03-05 RX ADMIN — ONDANSETRON 4 MG: 2 INJECTION INTRAMUSCULAR; INTRAVENOUS at 12:33

## 2025-03-05 RX ADMIN — FENTANYL CITRATE 50 MCG: 50 INJECTION, SOLUTION INTRAMUSCULAR; INTRAVENOUS at 12:43

## 2025-03-05 RX ADMIN — Medication 100 MCG: at 12:08

## 2025-03-05 RX ADMIN — IBUPROFEN 600 MG: 600 TABLET, FILM COATED ORAL at 13:44

## 2025-03-05 RX ADMIN — INDOCYANINE GREEN AND WATER 2.5 MG: KIT at 10:06

## 2025-03-05 RX ADMIN — FENTANYL CITRATE 50 MCG: 50 INJECTION, SOLUTION INTRAMUSCULAR; INTRAVENOUS at 11:58

## 2025-03-05 RX ADMIN — DEXAMETHASONE SODIUM PHOSPHATE 4 MG: 4 INJECTION, SOLUTION INTRA-ARTICULAR; INTRALESIONAL; INTRAMUSCULAR; INTRAVENOUS; SOFT TISSUE at 10:22

## 2025-03-05 RX ADMIN — SCOPOLAMINE 1 PATCH: 1.5 PATCH, EXTENDED RELEASE TRANSDERMAL at 10:22

## 2025-03-05 NOTE — BRIEF OP NOTE
CHOLECYSTECTOMY LAPAROSCOPIC WITH DAVINCI ROBOT  Progress Note    Macy MENDEZ David  3/5/2025    Pre-op Diagnosis:   Biliary colic [K80.50]       Post-Op Diagnosis Codes:     * Biliary colic [K80.50]    Procedure(s):      Procedure(s):  LAPAROSCOPIC ROBOTIC ASSISTED CHOLECYSTECTOMY WTIH PRE-OPERATIVE INDOCYANINE GREEN INJECTION              Surgeon(s):  Emil Flores MD    Anesthesia: General    Staff:   Circulator: Pat Bah RN; Viviane Ceballos RN  Scrub Person: Sky Osborne; Dory Wylie       Estimated Blood Loss: minimal    Urine Voided: * No values recorded between 3/5/2025 10:52 AM and 3/5/2025 12:18 PM *    Specimens:                Specimens       ID Source Type Tests Collected By Collected At Frozen?    A Gallbladder Tissue TISSUE PATHOLOGY EXAM   Emil Flores MD 3/5/25 1158 No    Description: Gallbladder and Contents              Drains: * No LDAs found *    Findings:   - Very thin and friable gallbladder, from the fundus to the infundibulum, small amount of bile leakage in the gallbladder fossa suctioned clean  -Was unable to obtain critical view of safety, given the no visualization of the cystic artery-however, only the cystic duct was seen clearly entering the gallbladder, with CBD observed distally and illuminated by ICG seen during firefly, this was seen as the only structure entering the gallbladder after the bottom third of the cystic plate was cleaned of adhesions      Complications: None immediately        Emil Flores MD     Date: 3/5/2025  Time: 12:26 CST

## 2025-03-05 NOTE — ANESTHESIA PREPROCEDURE EVALUATION
Anesthesia Evaluation     Patient summary reviewed   no history of anesthetic complications:   NPO Solid Status: > 8 hours             Airway   Mallampati: I  Dental      Pulmonary    (-) COPD, asthma, sleep apnea, not a smoker  Cardiovascular   Exercise tolerance: excellent (>7 METS)    (-) pacemaker, past MI, angina, cardiac stents      Neuro/Psych  (-) seizures, TIA, CVA  GI/Hepatic/Renal/Endo    (+) obesity, GERD, thyroid problem hypothyroidism  (-) liver disease, no renal disease, diabetes    Musculoskeletal     Abdominal    Substance History      OB/GYN    (-)  Pregnant        Other                    Anesthesia Plan    ASA 2     general     intravenous induction     Anesthetic plan, risks, benefits, and alternatives have been provided, discussed and informed consent has been obtained with: patient.    CODE STATUS:

## 2025-03-05 NOTE — INTERVAL H&P NOTE
H&P updated. The patient was examined and the following changes are noted:  HIDA scan decreased ejection fraction noted, and history consistent with diagnosis of biliary dyskinesia.  Proceed to the operating room for robotic cholecystectomy, with preoperative indocyanine green injection.  Consent is signed, after full discussion of risk, benefits, and alternatives to therapy.  Site marking is not indicated.  Proceed as above.

## 2025-03-05 NOTE — ANESTHESIA POSTPROCEDURE EVALUATION
Patient: Macy Hernandez    Procedure Summary       Date: 03/05/25 Room / Location:  [facility] OR 06 /  [facility] OR    Anesthesia Start: 1055 Anesthesia Stop: 1223    Procedure: LAPAROSCOPIC ROBOTIC ASSISTED CHOLECYSTECTOMY WTIH PRE-OPERATIVE INDOCYANINE GREEN INJECTION (Abdomen) Diagnosis:       Biliary colic      (Biliary colic [K80.50])    Surgeons: Emil Flores MD Provider: Jarett Garcia CRNA    Anesthesia Type: general ASA Status: 2            Anesthesia Type: general    Vitals  Vitals Value Taken Time   /82 03/05/25 1312   Temp 97.4 °F (36.3 °C) 03/05/25 1219   Pulse 87 03/05/25 1318   Resp 11 03/05/25 1305   SpO2 95 % 03/05/25 1318   Vitals shown include unfiled device data.        Post Anesthesia Care and Evaluation    Patient location during evaluation: PACU  Patient participation: complete - patient participated  Level of consciousness: awake and alert  Pain management: adequate    Airway patency: patent  Anesthetic complications: No anesthetic complications    Cardiovascular status: acceptable  Respiratory status: acceptable  Hydration status: acceptable    Comments: Blood pressure 139/83, pulse 82, temperature 97.4 °F (36.3 °C), temperature source Temporal, resp. rate 11, last menstrual period 03/05/2025, SpO2 91%, not currently breastfeeding.    Pt discharged from PACU based on santino score >8

## 2025-03-05 NOTE — ANESTHESIA PROCEDURE NOTES
Airway  Urgency: elective    Airway not difficult    General Information and Staff    Patient location during procedure: OR    Indications and Patient Condition  Indications for airway management: airway protection    Preoxygenated: yes  MILS maintained throughout  Mask difficulty assessment: 1 - vent by mask    Final Airway Details  Final airway type: endotracheal airway      Successful airway: ETT  Cuffed: yes   Successful intubation technique: direct laryngoscopy  Endotracheal tube insertion site: oral  Blade: Troy  Blade size: 3.5  ETT size (mm): 7.0  Cormack-Lehane Classification: grade I - full view of glottis  Placement verified by: chest auscultation and capnometry   Cuff volume (mL): 10  Measured from: lips  ETT/EBT  to lips (cm): 21  Number of attempts at approach: 1  Assessment: lips, teeth, and gum same as pre-op    Additional Comments  Placed by ONEYDA Baldwin

## 2025-03-05 NOTE — OP NOTE
GENERAL SURGERY OPERATIVE NOTE    Operative Date: 03/05/25    Operating Room Number: 6    Patient name: Macy Hernandez    Preoperative diagnosis: Biliary dyskinesia    Postoperative diagnosis: Same, possible chronic cholecystitis    Procedure performed: Robotic cholecystectomy with preoperative indocyanine green injection    Surgeon: Emil Flores MD    Staff:   Circulator: Pat Bah RN; Viviane Ceballos RN  Scrub Person: Sky Osborne; Dory Wylie    Anesthesia: General endotracheal anesthesia    Indications: This is a 24 y.o. female presenting with abdominal pain, and a HIDA scan demonstrating decreased ejection fraction consistent with a diagnosis of biliary dyskinesia.  We have recommended cholecystectomy via the robotic platform and the patient is agreeable and elects to proceed.       Findings:   - Very thin and friable gallbladder, from the fundus to the infundibulum, small amount of bile leakage in the gallbladder fossa suctioned clean  -Was unable to obtain critical view of safety, given the no visualization of the cystic artery-however, only the cystic duct was seen clearly entering the gallbladder, with CBD observed distally and illuminated by ICG seen during firefly, this was seen as the only structure entering the gallbladder after the bottom third of the cystic plate was cleaned of adhesions    Procedure: The patient was seen and assessed in the preoperative holding area. Signed consents were obtained after a full discussion of the risks, benefits, and alternative therapies. The patient was then transported to the operating theatre, transferred to the operating table under her own power, general anesthesia was induced, and the patient was intubated without difficulty. A timeout was performed, the patient's identity and the proposed procedure were confirmed; all present agreed and elected to proceed.    The abdomen was accessed with Veress needle at Barroso's point.  The abdomen was  insufflated to a pressure of 15 mmHg.  The abdomen was entered with an 8 mm robotic trocar in an Optiview fashion using a 5 mm 30 degree scope.  No injury to the abdomen was identified upon initial survey of the abdomen.  Additional trocars were then placed in the typical fashion from the patient's left upper quadrant to the right lower quadrant, approximately 1 handsbreadth apart, 4 in total.  The robot was docked.  The gallbladder was grasped by the fundus and retracted cephalad.  The infundibulum was retracted towards the right lower quadrant.  The gallbladder was incidentally entered, with a very small amount of thin bile drained in the gallbladder fossa, this was contained with a clip using a combination of blunt dissection and electrocautery the cystic triangle was dissected free of fatty attachments, of which there were many.  The cystic artery was not identified, but the cystic duct were isolated, and the bottom third of the cystic plate was seen and cleared of adhesions, with only the cystic duct seen entering the gallbladder.   The  cystic duct was thricely clipped and divided.     The gallbladder was then taken off the liver bed with electrocautery.  The gallbladder was then placed in an Endo Catch bag, and was removed through the left-sided periumbilical port site.  The site was then closed at the fascial layer using a 0 Vicryl suture in an interrupted fashion, using a transfascial suture passer under direct visualization.    The liver bed was then reinspected, and no flow of bile was seen coming from the liver bed via ICG, and no bleeding was apparent.  The abdomen was allowed to desufflate and the trocars removed under direct visualization.  The skin at all sites was closed with 4-0 Monocryl in a running subcuticular fashion.  The wounds were dressed with surgical adhesive.    At this point, the procedure was concluded, the patient was allowed to emerge from anesthesia, was extubated without  difficulty, and was transported to PACU in stable condition.    I was present for the entirety of the procedure.    Complications: None    EBL: Minimal    Specimens: Gallbladder    Drains: None    Implants: None    Disposition: PACU-anticipate discharge home following recovery from anesthesia      Emil Flores MD  3/5/2025   14:23 CST

## 2025-03-05 NOTE — DISCHARGE INSTRUCTIONS
[facility] General Surgery Discharge Instructions  Patient Name: Macy Hernandez  MRN: [id number]  YOB: 2000  Admit Date: 03/05/25      Admitting Diagnosis   Biliary dyskinesia      Procedures/Surgery:   03/05/25: Robotic cholecystectomy with preoperative ICG injection      Home Medications:    Pain medication:   - Take 1000mg of tylenol every 8 hours for 3 days. After three days, take it only as needed.  - Take 200mg of ibuprofen (motrin) every 8 hours for 3 days. After three days, take it only as needed.   - You have a prescription for a narcotic. Take these only as needed after you have taken the tylenol and ibuprofen. If you are taking the roxicodone, make sure to take a stool softener (colace) with it as it can cause constipation.   - The narcotic may make you nauseated, you will have a prescription for zofran in case of nausea.       Wound Care/Drains   - you have surgical adhesive covering your incisions; leave intact and do not peel it off. This will fall off on its own anywhere from 10days to 4 weeks.    - No swimming/soaking/bathing/wading or wearing waders for 2 weeks to allow incisions to heal.   - You may shower with surgical adhesive in place 48 hours after surgery.  Allow warm soapy water to run over the incisions, rinse thoroughly, do not scrub, and pat dry gently.    Activity     :   - You may slowly increase your activity after surgery beginning with walking the day of surgery, and increasing the distance slowly.  - No heavy lifting-this means no more than 15 pounds for at least  weeks following your operation.  Think about the pressure your abdomen feels if you hold a gallon of milk at arms length-you should feel no more pressure than this from anything you lift - if you do it is too heavy  - Do not drive, use power tools, or operate machinery (including lawn equipment) while taking pain medication    Follow-up Visits     :   - Schedule an appointment to see Dr. Flores in  his clinic in 2 weeks.  - If you have any concerns before then, call my office at [phone] [facility] you have concerns about bleeding, a fever greater than 102F, lightheadedness, dizziness, chest pain, shortness of breath, you should call my office as above or proceed to an emergency department closest to you for evaluation.      Emil Flores MD  3/5/2025   12:30 CST

## 2025-03-06 ENCOUNTER — TELEPHONE (OUTPATIENT)
Dept: SURGERY | Facility: CLINIC | Age: 25
End: 2025-03-06
Payer: COMMERCIAL

## 2025-03-06 LAB
CYTO UR: NORMAL
LAB AP CASE REPORT: NORMAL
Lab: NORMAL
PATH REPORT.FINAL DX SPEC: NORMAL
PATH REPORT.GROSS SPEC: NORMAL

## 2025-03-06 NOTE — TELEPHONE ENCOUNTER
Post OP phone call visit:    Type of surgery: Robotic cholecystectomy with preoperative indocyanine green injection.      Unable to reach patient. Left detailed message with call back phone number for any questions or concerns.

## 2025-03-19 NOTE — PROGRESS NOTES
"  [facility] General Surgery Clinic Progress Note  Macy Hernandez  MRN: [id number]  Age: 24 y.o. female   YOB: 2000      SUBJECTIVE  History of Presenting Illness   Patient is a 24 y.o. female with biliary dyskinesia who rtc today s/p robotic cholecystectomy on 3/5/2025. She reports that she is doing very well and recovering appropriately. Her preoperative sx have generally resolved, she did have some postoperative pain at the extraction site, but this seems to be resolved as well.       Physical Examination     Vitals:    03/20/25 1328   BP: (!) 151/109   Pulse: 98   SpO2: 97%   Weight: 130 kg (287 lb)   Height: 182 cm (71.65\")       Estimated body mass index is 39.31 kg/m² as calculated from the following:    Height as of this encounter: 182 cm (71.65\").    Weight as of this encounter: 130 kg (287 lb).  PREVIOUS WEIGHTS:   Wt Readings from Last 5 Encounters:   03/20/25 130 kg (287 lb)   03/03/25 131 kg (287 lb 14.7 oz)   02/06/25 132 kg (290 lb)   01/24/25 132 kg (290 lb)   04/15/24 130 kg (286 lb)       Physical Examination:  Gen: awake, alert, sitting up in chair in no acute distress  HEENT: NCAT, EOMI, anicteric sclerae  CV: RRR, normotensive  Resp: nonlabored on room air, sats appropriate  Abd: soft, nontender, nondistended; incisions are clean, dry, and intact-no evidence of hernia, infection, erythema  MSK: moves all four, no c/c/e  Neuro: no focal deficits, cranial nerves grossly intact  Psy:  appropriate, cooperative        Assessment/Plan   Macy Hernandez is a 24 y.o. female with biliary dyskinesia and chronic cholecystitis with cholesterolosis who rtc s/p cholecystectomy. She is doing extremely well and has recovered appropriately.  She has no need for scheduled follow-up my office, but knows to return to clinic if she has questions, concerns, or new symptoms.    Smoking cessation: Never smoker-no counseling indicated  BMI: 42.8-discussed options for management with her, including her " recent membership at the gym, and workout plan with her coworkers, as well as healthy diet choices, and we did also discuss potential referral to bariatric surgery for nutrition and exercise evaluation, in the future should the patient find that she needs the services.  Med rec complete: yes  VTE: VTE PPX is not indicated.       Emil Flores MD  3/20/2025   14:42 CDT

## 2025-03-20 ENCOUNTER — OFFICE VISIT (OUTPATIENT)
Dept: SURGERY | Facility: CLINIC | Age: 25
End: 2025-03-20
Payer: COMMERCIAL

## 2025-03-20 VITALS
DIASTOLIC BLOOD PRESSURE: 109 MMHG | BODY MASS INDEX: 38.87 KG/M2 | HEIGHT: 72 IN | SYSTOLIC BLOOD PRESSURE: 151 MMHG | HEART RATE: 98 BPM | OXYGEN SATURATION: 97 % | WEIGHT: 287 LBS

## 2025-03-20 DIAGNOSIS — K80.50 BILIARY COLIC: Primary | ICD-10-CM

## 2025-03-20 DIAGNOSIS — K82.8 BILIARY DYSKINESIA: ICD-10-CM

## 2025-03-20 PROCEDURE — 99024 POSTOP FOLLOW-UP VISIT: CPT | Performed by: STUDENT IN AN ORGANIZED HEALTH CARE EDUCATION/TRAINING PROGRAM

## 2025-03-20 NOTE — LETTER
"March 20, 2025     Uriel Bell MD  [address] [facility] [id number]    Patient: Macy Hernandez   YOB: 2000   Date of Visit: 3/20/2025     Dear Uriel Bell MD:     I have seen Macy Hernandez in my office today in follow-up for her recent robotic cholecystectomy on 3/5/2025.  She is doing very well, and has recovered appropriately; her preoperative symptoms have generally resolved as well.    Should you have any questions or concerns about her care, please reach out.  It has been a privilege to follow Macy along with you.    Sincerely,        Emil Flores MD        CC: No Recipients    Emil Flores MD  03/20/25 1443  Sign when Signing Visit    [facility] Progress Note  Macy Hernandez  MRN: [id number]  Age: 24 y.o. female   YOB: 2000      SUBJECTIVE  History of Presenting Illness   Patient is a 24 y.o. female with biliary dyskinesia who rtc today s/p robotic cholecystectomy on 3/5/2025. She reports that she is doing very well and recovering appropriately. Her preoperative sx have generally resolved, she did have some postoperative pain at the extraction site, but this seems to be resolved as well.       Physical Examination     Vitals:    03/20/25 1328   BP: (!) 151/109   Pulse: 98   SpO2: 97%   Weight: 130 kg (287 lb)   Height: 182 cm (71.65\")       Estimated body mass index is 39.31 kg/m² as calculated from the following:    Height as of this encounter: 182 cm (71.65\").    Weight as of this encounter: 130 kg (287 lb).  PREVIOUS WEIGHTS:   Wt Readings from Last 5 Encounters:   03/20/25 130 kg (287 lb)   03/03/25 131 kg (287 lb 14.7 oz)   02/06/25 132 kg (290 lb)   01/24/25 132 kg (290 lb)   04/15/24 130 kg (286 lb)       Physical Examination:  Gen: awake, alert, sitting up in chair in no acute distress  HEENT: NCAT, EOMI, anicteric sclerae  CV: RRR, normotensive  Resp: nonlabored on room air, sats appropriate  Abd: soft, nontender, " nondistended; incisions are clean, dry, and intact-no evidence of hernia, infection, erythema  MSK: moves all four, no c/c/e  Neuro: no focal deficits, cranial nerves grossly intact  Psy:  appropriate, cooperative        Assessment/Plan   Macy Hernandez is a 24 y.o. female with biliary dyskinesia and chronic cholecystitis with cholesterolosis who rtc s/p cholecystectomy. She is doing extremely well and has recovered appropriately.  She has no need for scheduled follow-up my office, but knows to return to clinic if she has questions, concerns, or new symptoms.    Smoking cessation: Never smoker-no counseling indicated  BMI: 42.8-discussed options for management with her, including her recent membership at the gym, and workout plan with her coworkers, as well as healthy diet choices, and we did also discuss potential referral to bariatric surgery for nutrition and exercise evaluation, in the future should the patient find that she needs the services.  Med rec complete: yes  VTE: VTE PPX is not indicated.       Emil Flores MD  3/20/2025   14:42 CDT

## 2025-04-16 ENCOUNTER — OFFICE VISIT (OUTPATIENT)
Age: 25
End: 2025-04-16
Payer: COMMERCIAL

## 2025-04-16 VITALS
HEIGHT: 69 IN | BODY MASS INDEX: 42.65 KG/M2 | SYSTOLIC BLOOD PRESSURE: 142 MMHG | DIASTOLIC BLOOD PRESSURE: 89 MMHG | WEIGHT: 288 LBS

## 2025-04-16 DIAGNOSIS — L65.9 HAIR THINNING: ICD-10-CM

## 2025-04-16 DIAGNOSIS — Z01.419 WELL WOMAN EXAM WITH ROUTINE GYNECOLOGICAL EXAM: Primary | ICD-10-CM

## 2025-04-16 DIAGNOSIS — Z30.011 ENCOUNTER FOR PRESCRIPTION OF ORAL CONTRACEPTIVES: ICD-10-CM

## 2025-04-16 DIAGNOSIS — Z12.4 SCREENING FOR CERVICAL CANCER: ICD-10-CM

## 2025-04-16 PROCEDURE — G0123 SCREEN CERV/VAG THIN LAYER: HCPCS

## 2025-04-16 RX ORDER — NORELGESTROMIN AND ETHINYL ESTRADIOL 35; 150 UG/MG; UG/MG
1 PATCH TRANSDERMAL WEEKLY
Qty: 3 PATCH | Refills: 12 | Status: SHIPPED | OUTPATIENT
Start: 2025-04-16

## 2025-04-16 RX ORDER — LEVOTHYROXINE SODIUM 75 UG/1
TABLET ORAL
COMMUNITY
Start: 2025-02-10

## 2025-04-16 RX ORDER — KETOCONAZOLE 20 MG/ML
SHAMPOO, SUSPENSION TOPICAL
COMMUNITY
Start: 2025-04-01

## 2025-04-16 RX ORDER — TRIAMCINOLONE ACETONIDE 1 MG/G
OINTMENT TOPICAL
COMMUNITY
Start: 2025-04-01

## 2025-04-16 RX ORDER — LEVOCETIRIZINE DIHYDROCHLORIDE 5 MG/1
5 TABLET, FILM COATED ORAL EVERY EVENING
COMMUNITY

## 2025-04-16 RX ORDER — NORELGESTROMIN AND ETHINYL ESTRADIOL 35; 150 UG/MG; UG/MG
PATCH TRANSDERMAL
COMMUNITY
End: 2025-04-16 | Stop reason: SDUPTHER

## 2025-04-16 NOTE — PROGRESS NOTES
"Subjective     Macy Hernandez is a 25 y.o. female    History of Present Illness  The patient presents to the office today for completion of her WWE. She reports continued hair thinning despite use of oral vitamins and shampoos. She denies having concerns regarding vaginal discharge, odor, pelvic pains, or urinary symptoms.  Gynecologic Exam  The patient's pertinent negatives include no genital itching, genital lesions, genital odor, genital rash, missed menses, pelvic pain, vaginal bleeding or vaginal discharge. This is a recurrent problem. The current episode started more than 1 year ago. The problem has been unchanged. The patient is experiencing no pain. Pertinent negatives include no abdominal pain, anorexia, back pain, chills, constipation, diarrhea, discolored urine, dysuria, fever, flank pain, frequency, headaches, hematuria, joint pain, joint swelling, nausea, painful intercourse, rash, sore throat, urgency or vomiting. She is not sexually active. No, her partner does not have an STD. She uses a patch for contraception. Her menstrual history has been regular. The maximum temperature recorded prior to her arrival was no fever.         /89   Ht 175.3 cm (69\")   Wt 131 kg (288 lb)   LMP 04/07/2025 (Approximate)   BMI 42.53 kg/m²     Outpatient Encounter Medications as of 4/16/2025   Medication Sig Dispense Refill    diphenhydrAMINE HCl (NELIA-SELTZER PLUS ALLERGY PO) Take 2 capsules by mouth As Needed.      FLUoxetine (PROzac) 20 MG capsule Take 1 capsule by mouth Daily.      ketoconazole (NIZORAL) 2 % shampoo APPLY TO THE AFFECTED AREA(S), LATHER, LEAVE IN PLACE FOR 5 MINUTES, AND THEN RINSE OFF WITH WATER BY TOPICAL ROUTE ONCE DAILY      levocetirizine (XYZAL) 5 MG tablet Take 1 tablet by mouth Every Evening.      levothyroxine (SYNTHROID, LEVOTHROID) 75 MCG tablet       norelgestromin-ethinyl estradiol (ORTHO EVRA) 150-35 MCG/24HR Place 1 patch on the skin as directed by provider 1 (One) Time Per " Week. 3 patch 12    triamcinolone (KENALOG) 0.1 % ointment apply a thin layer to the affected area(s) by topical route 2 times per day      [DISCONTINUED] norelgestromin-ethinyl estradiol (ORTHO EVRA) 150-35 MCG/24HR APPLY 1 PATCH ONCE A WEEK      [DISCONTINUED] docusate sodium (COLACE) 250 MG capsule Take 1 capsule by mouth 2 (Two) Times a Day As Needed for Constipation. 90 capsule 0    [DISCONTINUED] famotidine (PEPCID) 20 MG tablet Take 1 tablet by mouth At Night As Needed for Heartburn. 14 tablet 0    [DISCONTINUED] FLUoxetine (PROzac) 40 MG capsule Take 20 mg by mouth.      [DISCONTINUED] hyoscyamine (LEVSIN) 0.125 MG SL tablet DISOLVE 1 TABLET IN MOUTH THREE TIMES DAILY FOR GALLBLADDER SPASMS      [DISCONTINUED] levothyroxine (SYNTHROID, LEVOTHROID) 125 MCG tablet Take 1 tablet by mouth Every Morning.      [DISCONTINUED] norelgestromin-ethinyl estradiol (ORTHO EVRA) 150-35 MCG/24HR Place 1 patch on the skin as directed by provider 1 (One) Time Per Week for 30 days. 3 patch 12    [DISCONTINUED] ondansetron (ZOFRAN) 4 MG tablet Take 1 tablet by mouth Every 8 (Eight) Hours As Needed for Vomiting or Nausea. 15 tablet 0    [DISCONTINUED] ondansetron ODT (ZOFRAN-ODT) 4 MG disintegrating tablet Place 1 tablet on the tongue Every 8 (Eight) Hours As Needed for Vomiting or Nausea. 4 tablet 0    [DISCONTINUED] oxyCODONE (ROXICODONE) 5 MG immediate release tablet Take 1 tablet by mouth Every 6 (Six) Hours As Needed for Moderate Pain. 12 tablet 0     No facility-administered encounter medications on file as of 4/16/2025.       Past Medical History  Past Medical History:   Diagnosis Date    Anxiety     Hypothyroid     Ovarian cyst        Surgical History  Past Surgical History:   Procedure Laterality Date    CHOLECYSTECTOMY N/A 3/5/2025    Procedure: LAPAROSCOPIC ROBOTIC ASSISTED CHOLECYSTECTOMY WTIH PRE-OPERATIVE INDOCYANINE GREEN INJECTION;  Surgeon: Emil Flores MD;  Location: [facility];  Service: Robotics -  [place];  Laterality: N/A;    LAPAROSCOPIC OVARIAN CYSTECTOMY Right     WISDOM TOOTH EXTRACTION         Family History  Family History   Problem Relation Age of Onset    Heart disease Paternal Grandfather     Colon polyps Paternal Grandfather     Heart disease Paternal Grandmother     Heart disease Maternal Grandmother     Heart disease Maternal Grandfather     Ovarian cancer Maternal Great-Grandmother     Heart disease Other     Colon cancer Neg Hx     Breast cancer Neg Hx     Uterine cancer Neg Hx     Melanoma Neg Hx        The following portions of the patient's history were reviewed and updated as appropriate: allergies, current medications, past family history, past medical history, past social history, past surgical history, and problem list.    Review of Systems   Constitutional: Negative.  Negative for chills and fever.   HENT: Negative.  Negative for sore throat.    Eyes: Negative.    Respiratory: Negative.     Cardiovascular: Negative.    Gastrointestinal: Negative.  Negative for abdominal pain, anorexia, constipation, diarrhea, nausea and vomiting.   Endocrine: Negative.    Genitourinary: Negative.  Negative for breast discharge, breast lump, breast pain, dysuria, flank pain, frequency, hematuria, missed menses, pelvic pain, urgency and vaginal discharge.   Musculoskeletal: Negative.  Negative for back pain and joint pain.   Skin: Negative.  Negative for rash.   Allergic/Immunologic: Negative.    Neurological: Negative.    Hematological: Negative.    Psychiatric/Behavioral: Negative.         Objective   Physical Exam  Vitals and nursing note reviewed. Exam conducted with a chaperone present (Cynthia Wisdom ma).   Constitutional:       General: She is not in acute distress.     Appearance: She is well-developed. She is obese. She is not diaphoretic.   HENT:      Head: Normocephalic.      Right Ear: External ear normal.      Left Ear: External ear normal.      Nose: Nose normal.   Eyes:      General: No  scleral icterus.        Right eye: No discharge.         Left eye: No discharge.      Conjunctiva/sclera: Conjunctivae normal.      Pupils: Pupils are equal, round, and reactive to light.   Neck:      Thyroid: No thyromegaly.      Vascular: No carotid bruit.      Trachea: No tracheal deviation.   Cardiovascular:      Rate and Rhythm: Normal rate and regular rhythm.      Pulses: Normal pulses.      Heart sounds: Normal heart sounds. No murmur heard.  Pulmonary:      Effort: Pulmonary effort is normal. No respiratory distress.      Breath sounds: Normal breath sounds. No wheezing.   Chest:   Breasts:     Breasts are symmetrical.      Right: Normal. No swelling, bleeding, inverted nipple, mass, nipple discharge, skin change or tenderness.      Left: Normal. No swelling, bleeding, inverted nipple, mass, nipple discharge, skin change or tenderness.   Abdominal:      General: Bowel sounds are normal. There is no distension.      Palpations: Abdomen is soft. There is no mass.      Tenderness: There is no abdominal tenderness. There is no right CVA tenderness, left CVA tenderness or guarding.      Hernia: No hernia is present. There is no hernia in the left inguinal area or right inguinal area.   Genitourinary:     General: Normal vulva.      Exam position: Lithotomy position.      Labia:         Right: No rash, tenderness, lesion or injury.         Left: No rash, tenderness, lesion or injury.       Vagina: Normal. No signs of injury and foreign body. No vaginal discharge, erythema, tenderness or bleeding.      Cervix: Normal.      Uterus: Normal. Not enlarged, not fixed and not tender.       Adnexa: Right adnexa normal and left adnexa normal.        Right: No mass, tenderness or fullness.          Left: No mass, tenderness or fullness.        Rectum: Normal. No mass.      Comments:   BSU normal  Urethral meatus  Normal  Perineum  Normal  Musculoskeletal:         General: No tenderness. Normal range of motion.      Cervical  back: Normal range of motion and neck supple.   Lymphadenopathy:      Head:      Right side of head: No submental, submandibular, tonsillar, preauricular, posterior auricular or occipital adenopathy.      Left side of head: No submental, submandibular, tonsillar, preauricular, posterior auricular or occipital adenopathy.      Cervical: No cervical adenopathy.      Right cervical: No superficial, deep or posterior cervical adenopathy.     Left cervical: No superficial, deep or posterior cervical adenopathy.      Upper Body:      Right upper body: No supraclavicular, axillary or pectoral adenopathy.      Left upper body: No supraclavicular, axillary or pectoral adenopathy.      Lower Body: No right inguinal adenopathy. No left inguinal adenopathy.   Skin:     General: Skin is warm and dry.      Findings: No bruising, erythema or rash.   Neurological:      Mental Status: She is alert and oriented to person, place, and time.      Coordination: Coordination normal.   Psychiatric:         Mood and Affect: Mood normal.         Behavior: Behavior normal.         Thought Content: Thought content normal.         Judgment: Judgment normal.         PHQ-9 Depression Screening  Little interest or pleasure in doing things? Not at all   Feeling down, depressed, or hopeless? Not at all   PHQ-2 Total Score 0   Trouble falling or staying asleep, or sleeping too much?     Feeling tired or having little energy?     Poor appetite or overeating?     Feeling bad about yourself - or that you are a failure or have let yourself or your family down?     Trouble concentrating on things, such as reading the newspaper or watching television?     Moving or speaking so slowly that other people could have noticed? Or the opposite - being so fidgety or restless that you have been moving around a lot more than usual?     Thoughts that you would be better off dead, or of hurting yourself in some way?     PHQ-9 Total Score     If you checked off any  problems, how difficult have these problems made it for you to do your work, take care of things at home, or get along with other people?          Assessment & Plan   Diagnoses and all orders for this visit:    1. Well woman exam with routine gynecological exam (Primary)  Comments:  The patient presents to the office today for completion of her WWE. She is established with her pcp and screening labs are current.    2. Screening for cervical cancer  Comments:  Last exam/pap smear 3/14/2022, normal. Pap smear completed today. She is not currently sexually active, declines std screeing. Normal pelvic exam.  Orders:  -     Liquid-based Pap Smear, Screening    3. Encounter for prescription of oral contraceptives  Comments:  Patient doing well with use of contraception. Refills sent to pharmacy.  Orders:  -     norelgestromin-ethinyl estradiol (ORTHO EVRA) 150-35 MCG/24HR; Place 1 patch on the skin as directed by provider 1 (One) Time Per Week.  Dispense: 3 patch; Refill: 12    4. Hair thinning  Comments:  Reports history of hair thinning. Covid diagnosis in 2023. Has tried use of shampoos and vitamins without improvement. Will get labs today.  Orders:  -     TSH  -     T3, Uptake  -     T4, Free  -     Vitamin B12; Future  -     Folate; Future         Normal GYN exam. Encouraged SBE, pt is aware how to do self breast exam and the importance of same. Discussed weight management and importance of maintaining a healthy weight. Discussed Vitamin D intake and the importance of adequate vitamin D for both bone health and a healthy immune system.  Discussed daily exercise and the importance of same, in regards to a healthy heart as well as helping to maintain her weight and improving her mental health.  Colonoscopy is not indicated. Mammogram is not indicated. Bone density is not indicated. Pap smear is done per ASCCP guidelines. HPV is done. Lab work up is up to date.      Class 3 Severe Obesity (BMI >=40). Obesity-related  health conditions include the following: none. Obesity is unchanged. BMI is is above average; BMI management plan is completed. We discussed portion control, increasing exercise, and Information on healthy weight added to patient's after visit summary.      Daniela Dunham, APRN  4/16/2025

## 2025-04-17 ENCOUNTER — RESULTS FOLLOW-UP (OUTPATIENT)
Age: 25
End: 2025-04-17
Payer: COMMERCIAL

## 2025-04-17 LAB
T3RU NFR SERPL: 22 % (ref 24–39)
T4 FREE SERPL-MCNC: 1.08 NG/DL (ref 0.82–1.77)
TSH SERPL DL<=0.005 MIU/L-ACNC: 6.61 UIU/ML (ref 0.45–4.5)

## 2025-04-17 NOTE — PROGRESS NOTES
Please make sure her most recent thyroid labs are sent to Dr. Bell for review. I recommended patient contact him to schedule OV. Thank you, Daniela

## 2025-04-18 LAB
GEN CATEG CVX/VAG CYTO-IMP: NORMAL
LAB AP CASE REPORT: NORMAL
LAB AP GYN ADDITIONAL INFORMATION: NORMAL
LAB AP GYN OTHER FINDINGS: NORMAL
Lab: NORMAL
PATH INTERP SPEC-IMP: NORMAL
STAT OF ADQ CVX/VAG CYTO-IMP: NORMAL

## 2025-04-22 NOTE — TELEPHONE ENCOUNTER
Pt returned phone call and  updated to follow up with PCP regarding abnormal thyroid results. Pt voices understanding.

## 2025-04-22 NOTE — TELEPHONE ENCOUNTER
Pt called and left VM regarding lab results. Attempted to return phone call with no response. Left VM to return phone call.

## 2025-06-04 ENCOUNTER — LAB (OUTPATIENT)
Dept: LAB | Facility: HOSPITAL | Age: 25
End: 2025-06-04
Payer: COMMERCIAL

## 2025-06-04 DIAGNOSIS — E03.9 HYPOTHYROIDISM, UNSPECIFIED TYPE: ICD-10-CM

## 2025-06-04 DIAGNOSIS — E66.01 MORBID (SEVERE) OBESITY DUE TO EXCESS CALORIES: ICD-10-CM

## 2025-06-04 DIAGNOSIS — I10 ESSENTIAL (PRIMARY) HYPERTENSION: ICD-10-CM

## 2025-06-04 LAB
ALBUMIN SERPL-MCNC: 4 G/DL (ref 3.5–5.2)
ALBUMIN/GLOB SERPL: 1.3 G/DL
ALP SERPL-CCNC: 61 U/L (ref 39–117)
ALT SERPL W P-5'-P-CCNC: 83 U/L (ref 1–33)
ANION GAP SERPL CALCULATED.3IONS-SCNC: 12 MMOL/L (ref 5–15)
AST SERPL-CCNC: 53 U/L (ref 1–32)
BASOPHILS # BLD AUTO: 0.1 10*3/MM3 (ref 0–0.2)
BASOPHILS NFR BLD AUTO: 0.9 % (ref 0–1.5)
BILIRUB SERPL-MCNC: 0.2 MG/DL (ref 0–1.2)
BUN SERPL-MCNC: 9.9 MG/DL (ref 6–20)
BUN/CREAT SERPL: 15 (ref 7–25)
CALCIUM SPEC-SCNC: 9.1 MG/DL (ref 8.6–10.5)
CHLORIDE SERPL-SCNC: 102 MMOL/L (ref 98–107)
CHOLEST SERPL-MCNC: 190 MG/DL (ref 0–200)
CO2 SERPL-SCNC: 22 MMOL/L (ref 22–29)
CREAT SERPL-MCNC: 0.66 MG/DL (ref 0.57–1)
DEPRECATED RDW RBC AUTO: 40.4 FL (ref 37–54)
EGFRCR SERPLBLD CKD-EPI 2021: 124.2 ML/MIN/1.73
EOSINOPHIL # BLD AUTO: 0.37 10*3/MM3 (ref 0–0.4)
EOSINOPHIL NFR BLD AUTO: 3.2 % (ref 0.3–6.2)
ERYTHROCYTE [DISTWIDTH] IN BLOOD BY AUTOMATED COUNT: 12.4 % (ref 12.3–15.4)
GLOBULIN UR ELPH-MCNC: 3 GM/DL
GLUCOSE SERPL-MCNC: 95 MG/DL (ref 65–99)
HBA1C MFR BLD: 5.2 % (ref 4.8–5.6)
HCT VFR BLD AUTO: 38.6 % (ref 34–46.6)
HDLC SERPL-MCNC: 57 MG/DL (ref 40–60)
HGB BLD-MCNC: 13 G/DL (ref 12–15.9)
IMM GRANULOCYTES # BLD AUTO: 0.04 10*3/MM3 (ref 0–0.05)
IMM GRANULOCYTES NFR BLD AUTO: 0.3 % (ref 0–0.5)
LDLC SERPL CALC-MCNC: 108 MG/DL (ref 0–100)
LDLC/HDLC SERPL: 1.84 {RATIO}
LYMPHOCYTES # BLD AUTO: 3.98 10*3/MM3 (ref 0.7–3.1)
LYMPHOCYTES NFR BLD AUTO: 34.1 % (ref 19.6–45.3)
MCH RBC QN AUTO: 30.3 PG (ref 26.6–33)
MCHC RBC AUTO-ENTMCNC: 33.7 G/DL (ref 31.5–35.7)
MCV RBC AUTO: 90 FL (ref 79–97)
MONOCYTES # BLD AUTO: 0.86 10*3/MM3 (ref 0.1–0.9)
MONOCYTES NFR BLD AUTO: 7.4 % (ref 5–12)
NEUTROPHILS NFR BLD AUTO: 54.1 % (ref 42.7–76)
NEUTROPHILS NFR BLD AUTO: 6.33 10*3/MM3 (ref 1.7–7)
NRBC BLD AUTO-RTO: 0 /100 WBC (ref 0–0.2)
PLATELET # BLD AUTO: 259 10*3/MM3 (ref 140–450)
PMV BLD AUTO: 11.2 FL (ref 6–12)
POTASSIUM SERPL-SCNC: 3.8 MMOL/L (ref 3.5–5.2)
PROT SERPL-MCNC: 7 G/DL (ref 6–8.5)
RBC # BLD AUTO: 4.29 10*6/MM3 (ref 3.77–5.28)
SODIUM SERPL-SCNC: 136 MMOL/L (ref 136–145)
T4 FREE SERPL-MCNC: 1.09 NG/DL (ref 0.92–1.68)
TRIGL SERPL-MCNC: 141 MG/DL (ref 0–150)
TSH SERPL DL<=0.05 MIU/L-ACNC: 8.71 UIU/ML (ref 0.27–4.2)
VLDLC SERPL-MCNC: 25 MG/DL (ref 5–40)
WBC NRBC COR # BLD AUTO: 11.68 10*3/MM3 (ref 3.4–10.8)

## 2025-06-04 PROCEDURE — 80050 GENERAL HEALTH PANEL: CPT

## 2025-06-04 PROCEDURE — 36415 COLL VENOUS BLD VENIPUNCTURE: CPT

## 2025-06-04 PROCEDURE — 80061 LIPID PANEL: CPT

## 2025-06-04 PROCEDURE — 83036 HEMOGLOBIN GLYCOSYLATED A1C: CPT

## 2025-06-04 PROCEDURE — 84439 ASSAY OF FREE THYROXINE: CPT

## 2025-06-06 ENCOUNTER — TRANSCRIBE ORDERS (OUTPATIENT)
Dept: ADMINISTRATIVE | Facility: HOSPITAL | Age: 25
End: 2025-06-06
Payer: COMMERCIAL

## 2025-06-06 DIAGNOSIS — E03.9 HYPOTHYROIDISM, UNSPECIFIED TYPE: Primary | ICD-10-CM

## 2025-08-21 ENCOUNTER — OFFICE VISIT (OUTPATIENT)
Dept: OBGYN CLINIC | Age: 25
End: 2025-08-21
Payer: COMMERCIAL

## 2025-08-21 VITALS
HEART RATE: 93 BPM | DIASTOLIC BLOOD PRESSURE: 82 MMHG | SYSTOLIC BLOOD PRESSURE: 138 MMHG | BODY MASS INDEX: 41.9 KG/M2 | WEIGHT: 292 LBS

## 2025-08-21 DIAGNOSIS — Z30.09 BIRTH CONTROL COUNSELING: ICD-10-CM

## 2025-08-21 DIAGNOSIS — Z76.89 ENCOUNTER TO ESTABLISH CARE: Primary | ICD-10-CM

## 2025-08-21 PROCEDURE — 99203 OFFICE O/P NEW LOW 30 MIN: CPT | Performed by: ADVANCED PRACTICE MIDWIFE

## 2025-08-21 RX ORDER — NORELGESTROMIN AND ETHINYL ESTRADIOL 35; 150 UG/MG; UG/MG
1 PATCH TRANSDERMAL WEEKLY
COMMUNITY

## (undated) DEVICE — SUT MNCRYL 4/0 PS2 27IN UD MCP426H

## (undated) DEVICE — TIP COVER ACCESSORY

## (undated) DEVICE — ARM DRAPE

## (undated) DEVICE — NEEDLE INSUF L150MM DIA2MM DISP FOR PNEUMOPERI ENDOPATH

## (undated) DEVICE — APPL HEMO ENDO SURGICEL 2IN1 1P/U STRL

## (undated) DEVICE — MICRO HVTSA, 0.5G AND HVTSA SOURCEMARK PRODUCT CODE M1206 AND M1206-01: Brand: EXOFIN MICRO HVTSA, 0.5G

## (undated) DEVICE — GLOVE SURG SZ 65 CRM LTX FREE POLYISOPRENE POLYMER BEAD ANTI

## (undated) DEVICE — INTENDED TO AID IN THE PASSING OF SUTURES THROUGH BONE AND SOFT TISSUE DURING ORTHOPEDIC SURGERY: Brand: HOFFEE SUTURE RETRIEVER

## (undated) DEVICE — SOLUTION IV IRRIG WATER 1000ML POUR BRL 2F7114

## (undated) DEVICE — AIRSEAL 8 MM ACCESS PORT AND LOW PROFILE OBTURATOR WITH BLADELESS OPTICAL TIP, 120 MM LENGTH: Brand: AIRSEAL

## (undated) DEVICE — LARYNGOSCOPE BLDE MAC HNDL M SZ 35 ST CURAPLEX CURAVIEW LED

## (undated) DEVICE — GARMENT COMPR STD FOR 17IN CALF UNIF THER FLOTRN

## (undated) DEVICE — Y-TYPE TUR/BLADDER IRRIGATION SET, REGULATING CLAMP

## (undated) DEVICE — SUTURE MCRYL SZ 4-0 L18IN ABSRB UD L19MM PS-2 3/8 CIR PRIM Y496G

## (undated) DEVICE — 40595 XL TRENDELENBURG POSITIONING KIT: Brand: 40595 XL TRENDELENBURG POSITIONING KIT

## (undated) DEVICE — DEV SUT GRSPR CLOSUR 15CM 14G

## (undated) DEVICE — ADHESIVE SKIN CLSR 0.7ML TOP DERMBND ADV

## (undated) DEVICE — SEAL

## (undated) DEVICE — DAVINCI: Brand: MEDLINE INDUSTRIES, INC.

## (undated) DEVICE — GLV SURG SENSICARE W/ALOE PF LF 7.5 STRL

## (undated) DEVICE — SOLUTION IRRIG 3000ML 0.9% SOD CHL USP UROMATIC PLAS CONT

## (undated) DEVICE — SYR LUERLOK 20CC BX/50

## (undated) DEVICE — BLADELESS OBTURATOR: Brand: WECK VISTA

## (undated) DEVICE — NEEDLE,22GX1.5",REG,BEVEL: Brand: MEDLINE

## (undated) DEVICE — CANNULA SEAL

## (undated) DEVICE — TUBE ET 7MM NSL ORAL BASIC CUF INTMED MURPHY EYE RADPQ MRK

## (undated) DEVICE — VCARE MEDIUM, UTERINE MANIPULATOR, VAGINAL-CERVICAL-AHLUWALIA'S-RETRACTOR-ELEVATOR: Brand: VCARE

## (undated) DEVICE — Device

## (undated) DEVICE — CUFF BLD PRESSURE 1 TUBE AD 25-34 CM ARM VLY FLEXIPORT DISP

## (undated) DEVICE — COVER LT HNDL BLU PLAS

## (undated) DEVICE — SYR LUERLOK 30CC

## (undated) DEVICE — TISSUE RETRIEVAL SYSTEM: Brand: INZII RETRIEVAL SYSTEM

## (undated) DEVICE — KT CLN CLEANOR SCPE

## (undated) DEVICE — SYR LL TP 10ML STRL

## (undated) DEVICE — STRIP CLS WND CURAD MEDI/STRIP HYPOALLERG 0.25X4IN PK/10

## (undated) DEVICE — GOWN,PREVENTION PLUS,XLN/XL,ST,24/CS: Brand: MEDLINE

## (undated) DEVICE — SUTURE ABSORBABLE MONOFILAMENT 0 CT-2 12 IN STRATAFIX SPRL SXPP1B405

## (undated) DEVICE — SUTURE VCRL SZ 0 L36IN ABSRB UD L36MM CT-1 1/2 CIR J946H

## (undated) DEVICE — PATIENT RETURN ELECTRODE, SINGLE-USE, CONTACT QUALITY MONITORING, ADULT, WITH 9FT CORD, FOR PATIENTS WEIGING OVER 33LBS. (15KG): Brand: MEGADYNE

## (undated) DEVICE — TBG INSUFFLATION LUER LOCK: Brand: MEDLINE INDUSTRIES, INC.

## (undated) DEVICE — TRI-LUMEN FILTERED TUBE SET WITH ACTIVATED CHARCOAL FILTER: Brand: AIRSEAL

## (undated) DEVICE — SUT VIC 0 SUTUPAK TIES 18IN J906G